# Patient Record
Sex: FEMALE | Race: WHITE | Employment: OTHER | URBAN - METROPOLITAN AREA
[De-identification: names, ages, dates, MRNs, and addresses within clinical notes are randomized per-mention and may not be internally consistent; named-entity substitution may affect disease eponyms.]

---

## 2017-01-03 RX ORDER — PANTOPRAZOLE SODIUM 40 MG/1
TABLET, DELAYED RELEASE ORAL
Qty: 90 TABLET | Refills: 3 | Status: SHIPPED | OUTPATIENT
Start: 2017-01-03 | End: 2017-04-27 | Stop reason: SDUPTHER

## 2017-02-10 DIAGNOSIS — E78.2 MIXED HYPERLIPIDEMIA: ICD-10-CM

## 2017-02-13 RX ORDER — CLONAZEPAM 0.5 MG/1
TABLET ORAL
Qty: 60 TABLET | Refills: 1 | Status: SHIPPED | OUTPATIENT
Start: 2017-02-13 | End: 2017-04-27 | Stop reason: SDUPTHER

## 2017-02-13 RX ORDER — SIMVASTATIN 20 MG
TABLET ORAL
Qty: 90 TABLET | Refills: 3 | Status: SHIPPED | OUTPATIENT
Start: 2017-02-13

## 2017-03-11 ENCOUNTER — HOSPITAL ENCOUNTER (OUTPATIENT)
Dept: WOMENS IMAGING | Age: 47
Discharge: HOME | End: 2017-03-11
Payer: COMMERCIAL

## 2017-03-11 DIAGNOSIS — Z13.9 SCREENING: ICD-10-CM

## 2017-03-11 PROCEDURE — G0202 SCR MAMMO BI INCL CAD: HCPCS

## 2017-03-14 DIAGNOSIS — R74.8 ELEVATED LIVER ENZYMES: ICD-10-CM

## 2017-03-14 DIAGNOSIS — K76.0 HEPATIC STEATOSIS: Primary | ICD-10-CM

## 2017-03-15 ENCOUNTER — OFFICE VISIT (OUTPATIENT)
Dept: GASTROENTEROLOGY | Age: 47
End: 2017-03-15
Payer: COMMERCIAL

## 2017-03-15 VITALS
OXYGEN SATURATION: 98 % | BODY MASS INDEX: 36.29 KG/M2 | WEIGHT: 225.8 LBS | SYSTOLIC BLOOD PRESSURE: 146 MMHG | HEIGHT: 66 IN | TEMPERATURE: 97.1 F | HEART RATE: 74 BPM | DIASTOLIC BLOOD PRESSURE: 82 MMHG

## 2017-03-15 DIAGNOSIS — R16.0 HEPATOMEGALY: ICD-10-CM

## 2017-03-15 DIAGNOSIS — R74.8 ELEVATED LIVER ENZYMES: ICD-10-CM

## 2017-03-15 DIAGNOSIS — R74.8 ELEVATED LIVER ENZYMES: Primary | ICD-10-CM

## 2017-03-15 DIAGNOSIS — K76.0 HEPATIC STEATOSIS: ICD-10-CM

## 2017-03-15 PROCEDURE — 99213 OFFICE O/P EST LOW 20 MIN: CPT | Performed by: INTERNAL MEDICINE

## 2017-03-15 PROCEDURE — 83037 HB GLYCOSYLATED A1C HOME DEV: CPT | Performed by: INTERNAL MEDICINE

## 2017-03-15 RX ORDER — VITAMIN E 268 MG
800 CAPSULE ORAL DAILY
Qty: 60 CAPSULE | Refills: 11 | Status: ON HOLD | OUTPATIENT
Start: 2017-03-15 | End: 2018-06-19 | Stop reason: HOSPADM

## 2017-03-15 ASSESSMENT — ENCOUNTER SYMPTOMS
VOMITING: 0
ABDOMINAL DISTENTION: 0
TROUBLE SWALLOWING: 0
BLOOD IN STOOL: 0
STRIDOR: 0
WHEEZING: 0
RESPIRATORY NEGATIVE: 1
BACK PAIN: 1
ABDOMINAL PAIN: 1
RECTAL PAIN: 0
RHINORRHEA: 0
VOICE CHANGE: 0
DIARRHEA: 1
ALLERGIC/IMMUNOLOGIC NEGATIVE: 1
SINUS PRESSURE: 0
NAUSEA: 0
ROS SKIN COMMENTS: ITCHING
CONSTIPATION: 1
FACIAL SWELLING: 0
ANAL BLEEDING: 0
SHORTNESS OF BREATH: 0
COUGH: 0
SORE THROAT: 0

## 2017-03-17 RX ORDER — DULOXETIN HYDROCHLORIDE 60 MG/1
60 CAPSULE, DELAYED RELEASE ORAL DAILY
Qty: 5 CAPSULE | Refills: 0 | Status: SHIPPED | OUTPATIENT
Start: 2017-03-17 | End: 2017-03-20 | Stop reason: SDUPTHER

## 2017-03-20 RX ORDER — DULOXETIN HYDROCHLORIDE 60 MG/1
60 CAPSULE, DELAYED RELEASE ORAL DAILY
Qty: 30 CAPSULE | Refills: 1 | Status: SHIPPED | OUTPATIENT
Start: 2017-03-20 | End: 2017-05-26 | Stop reason: SDUPTHER

## 2017-04-27 RX ORDER — CLONAZEPAM 0.5 MG/1
TABLET ORAL
Qty: 60 TABLET | Refills: 1 | Status: SHIPPED | OUTPATIENT
Start: 2017-04-27 | End: 2017-07-06 | Stop reason: SDUPTHER

## 2017-04-27 RX ORDER — PANTOPRAZOLE SODIUM 40 MG/1
TABLET, DELAYED RELEASE ORAL
Qty: 90 TABLET | Refills: 3 | Status: SHIPPED | OUTPATIENT
Start: 2017-04-27 | End: 2020-02-26

## 2017-05-30 ENCOUNTER — OFFICE VISIT (OUTPATIENT)
Dept: FAMILY MEDICINE CLINIC | Age: 47
End: 2017-05-30
Payer: COMMERCIAL

## 2017-05-30 DIAGNOSIS — L23.7 POISON IVY DERMATITIS: Primary | ICD-10-CM

## 2017-05-30 PROCEDURE — 96372 THER/PROPH/DIAG INJ SC/IM: CPT

## 2017-05-30 PROCEDURE — 99213 OFFICE O/P EST LOW 20 MIN: CPT

## 2017-05-30 RX ORDER — METHYLPREDNISOLONE ACETATE 40 MG/ML
40 INJECTION, SUSPENSION INTRA-ARTICULAR; INTRALESIONAL; INTRAMUSCULAR; SOFT TISSUE ONCE
Status: COMPLETED | OUTPATIENT
Start: 2017-05-30 | End: 2017-05-30

## 2017-05-30 RX ORDER — DULOXETIN HYDROCHLORIDE 60 MG/1
CAPSULE, DELAYED RELEASE ORAL
Qty: 30 CAPSULE | Refills: 1 | Status: SHIPPED | OUTPATIENT
Start: 2017-05-30 | End: 2017-07-24 | Stop reason: SDUPTHER

## 2017-05-30 RX ORDER — PREDNISONE 20 MG/1
20 TABLET ORAL 2 TIMES DAILY
Qty: 14 TABLET | Refills: 0 | Status: SHIPPED | OUTPATIENT
Start: 2017-05-30 | End: 2017-06-06

## 2017-05-30 RX ADMIN — METHYLPREDNISOLONE ACETATE 40 MG: 40 INJECTION, SUSPENSION INTRA-ARTICULAR; INTRALESIONAL; INTRAMUSCULAR; SOFT TISSUE at 15:35

## 2017-05-30 ASSESSMENT — PATIENT HEALTH QUESTIONNAIRE - PHQ9
1. LITTLE INTEREST OR PLEASURE IN DOING THINGS: 0
SUM OF ALL RESPONSES TO PHQ9 QUESTIONS 1 & 2: 0
SUM OF ALL RESPONSES TO PHQ QUESTIONS 1-9: 0
2. FEELING DOWN, DEPRESSED OR HOPELESS: 0

## 2017-05-30 ASSESSMENT — ENCOUNTER SYMPTOMS
WHEEZING: 0
SHORTNESS OF BREATH: 1
COUGH: 1

## 2017-07-06 RX ORDER — CLONAZEPAM 0.5 MG/1
TABLET ORAL
Qty: 60 TABLET | Refills: 1 | Status: SHIPPED | OUTPATIENT
Start: 2017-07-06

## 2017-07-25 RX ORDER — DULOXETIN HYDROCHLORIDE 60 MG/1
CAPSULE, DELAYED RELEASE ORAL
Qty: 90 CAPSULE | Refills: 2 | Status: SHIPPED | OUTPATIENT
Start: 2017-07-25 | End: 2017-08-08 | Stop reason: SDUPTHER

## 2017-08-09 RX ORDER — DULOXETIN HYDROCHLORIDE 60 MG/1
CAPSULE, DELAYED RELEASE ORAL
Qty: 90 CAPSULE | Refills: 2 | Status: ON HOLD | OUTPATIENT
Start: 2017-08-09 | End: 2020-05-01 | Stop reason: ALTCHOICE

## 2017-11-09 ENCOUNTER — TELEPHONE (OUTPATIENT)
Dept: GASTROENTEROLOGY | Age: 47
End: 2017-11-09

## 2017-11-09 NOTE — TELEPHONE ENCOUNTER
[birth year] lm due to conflict in the providers schedule appt for 3-15-18 has been cancelled to cb to r/s--jt  Letter sent also

## 2017-11-20 ENCOUNTER — HOSPITAL ENCOUNTER (OUTPATIENT)
Dept: WOMENS IMAGING | Age: 47
Discharge: HOME | End: 2017-11-20
Payer: COMMERCIAL

## 2017-11-20 DIAGNOSIS — M85.80 OSTEOPENIA, UNSPECIFIED LOCATION: ICD-10-CM

## 2017-11-20 PROCEDURE — 77080 DXA BONE DENSITY AXIAL: CPT

## 2018-03-08 ENCOUNTER — TELEPHONE (OUTPATIENT)
Dept: GASTROENTEROLOGY | Age: 48
End: 2018-03-08

## 2018-03-14 ENCOUNTER — OFFICE VISIT (OUTPATIENT)
Dept: GASTROENTEROLOGY | Age: 48
End: 2018-03-14
Payer: COMMERCIAL

## 2018-03-14 VITALS
HEIGHT: 67 IN | HEART RATE: 80 BPM | OXYGEN SATURATION: 97 % | DIASTOLIC BLOOD PRESSURE: 77 MMHG | WEIGHT: 216.6 LBS | SYSTOLIC BLOOD PRESSURE: 129 MMHG | BODY MASS INDEX: 34 KG/M2

## 2018-03-14 DIAGNOSIS — R74.8 ELEVATED LIVER ENZYMES: Primary | ICD-10-CM

## 2018-03-14 DIAGNOSIS — R16.0 HEPATOMEGALY: ICD-10-CM

## 2018-03-14 DIAGNOSIS — K76.0 HEPATIC STEATOSIS: ICD-10-CM

## 2018-03-14 PROCEDURE — 1036F TOBACCO NON-USER: CPT | Performed by: INTERNAL MEDICINE

## 2018-03-14 PROCEDURE — G8417 CALC BMI ABV UP PARAM F/U: HCPCS | Performed by: INTERNAL MEDICINE

## 2018-03-14 PROCEDURE — G8484 FLU IMMUNIZE NO ADMIN: HCPCS | Performed by: INTERNAL MEDICINE

## 2018-03-14 PROCEDURE — G8427 DOCREV CUR MEDS BY ELIG CLIN: HCPCS | Performed by: INTERNAL MEDICINE

## 2018-03-14 PROCEDURE — 99214 OFFICE O/P EST MOD 30 MIN: CPT | Performed by: INTERNAL MEDICINE

## 2018-03-14 PROCEDURE — 83037 HB GLYCOSYLATED A1C HOME DEV: CPT | Performed by: INTERNAL MEDICINE

## 2018-03-14 RX ORDER — AZITHROMYCIN 250 MG/1
1 TABLET, FILM COATED ORAL DAILY
Status: ON HOLD | COMMUNITY
Start: 2018-03-12 | End: 2018-06-19 | Stop reason: HOSPADM

## 2018-03-14 ASSESSMENT — ENCOUNTER SYMPTOMS
VOICE CHANGE: 0
VOMITING: 0
BACK PAIN: 1
FACIAL SWELLING: 0
NAUSEA: 0
CONSTIPATION: 0
DIARRHEA: 0
COUGH: 1
BLOOD IN STOOL: 0
SHORTNESS OF BREATH: 1
STRIDOR: 0
RHINORRHEA: 0
SORE THROAT: 0
SINUS PAIN: 0
ALLERGIC/IMMUNOLOGIC NEGATIVE: 1
TROUBLE SWALLOWING: 0
ANAL BLEEDING: 0
ABDOMINAL PAIN: 0
WHEEZING: 1
RECTAL PAIN: 0
ABDOMINAL DISTENTION: 0
SINUS PRESSURE: 1

## 2018-03-14 NOTE — PROGRESS NOTES
Subjective:      Patient ID: Rosalinda Carter is a 50 y.o. female. HPI   Dr. Juan Alberto Mcallister, LONI our mutual patient Rosalinda Carter was seen  for   1. Elevated liver enzymes    2. Hepatic steatosis    3. Hepatomegaly      Patient seen in the office for follow-up of liver disease. She was diagnosed to have fatty infiltration of the liver. In the last 1 year she lost 9 pounds. She is not able to do active exercise on daily basis. She denies abdominal pain, bloating, nausea or vomiting. Patient states that she has gynecological issues and is being followed by gynecologist.    Recently patient had liver tests done which appears to be normal.  Hemoglobin A1c was not done. Past Medical, Family, and Social History reviewed and does contribute to the patient presenting condition. patient\"s PMH/PSH,SH,PSYCH hx, MEDs, ALLERGIES, and ROS was all reviewed and updated ion the appropriate sections      Review of Systems   Constitutional: Positive for fatigue. Negative for activity change, appetite change, chills, diaphoresis, fever and unexpected weight change. HENT: Positive for congestion, postnasal drip and sinus pressure. Negative for dental problem, drooling, ear discharge, ear pain, facial swelling, hearing loss, mouth sores, nosebleeds, rhinorrhea, sinus pain, sneezing, sore throat, tinnitus, trouble swallowing and voice change. Eyes: Positive for visual disturbance (glasses). Respiratory: Positive for cough, shortness of breath and wheezing. Negative for stridor. Being tx for URI   Cardiovascular: Positive for palpitations (off and on). Negative for chest pain and leg swelling. Gastrointestinal: Negative for abdominal distention, abdominal pain, anal bleeding, blood in stool, constipation, diarrhea, nausea, rectal pain and vomiting. Endocrine: Negative. Negative for polyphagia and polyuria. Genitourinary: Negative.   Negative for difficulty urinating, dysuria, frequency, hematuria, vaginal bleeding and vaginal discharge. Musculoskeletal: Positive for arthralgias and back pain. Negative for gait problem. Allergic/Immunologic: Negative. Negative for environmental allergies, food allergies and immunocompromised state. Neurological: Positive for light-headedness. Negative for dizziness, tremors, seizures, syncope, facial asymmetry, speech difficulty, weakness, numbness and headaches. Hematological: Negative. Psychiatric/Behavioral: Positive for sleep disturbance. The patient is nervous/anxious. Objective:   Physical Exam   Constitutional: She is oriented to person, place, and time. She appears well-developed and well-nourished. HENT:   Head: Normocephalic and atraumatic. No oral lesions   Eyes: Conjunctivae are normal. Pupils are equal, round, and reactive to light. No scleral icterus. Neck: Normal range of motion. Neck supple. No hepatojugular reflux and no JVD present. No tracheal deviation present. No thyromegaly present. Cardiovascular: Normal rate, regular rhythm, normal heart sounds and intact distal pulses. Pulmonary/Chest: Effort normal and breath sounds normal. No respiratory distress. She has no wheezes. She has no rales. Abdominal: Soft. Bowel sounds are normal. She exhibits no distension, no ascites and no mass. There is no hepatomegaly. There is no tenderness. There is no rebound. No hernia. Musculoskeletal: She exhibits no edema or tenderness. No joint swelling   Lymphadenopathy:     She has no cervical adenopathy. Neurological: She is alert and oriented to person, place, and time. No cranial nerve deficit. Skin: Skin is warm. No bruising, no ecchymosis and no rash noted. No erythema. Psychiatric: Thought content normal.   Nursing note and vitals reviewed. Assessment:      1. Elevated liver enzymes     2. Hepatic steatosis  POCT glycosylated hemoglobin, home device (HbA1C)   3. Hepatomegaly             Plan:       At present her examination nonspecific. Patient still appears to be overweight. Discussed with the patient regarding fatty liver and management. Strongly encouraged active lifestyle, daily exercise, calorie restriction and lose weight. Also advised to have hemoglobin A1c done. To see me in the next 1 year.

## 2018-06-14 ENCOUNTER — HOSPITAL ENCOUNTER (EMERGENCY)
Age: 48
Discharge: HOME | DRG: 581 | End: 2018-06-14
Attending: EMERGENCY MEDICINE
Payer: COMMERCIAL

## 2018-06-14 VITALS
SYSTOLIC BLOOD PRESSURE: 159 MMHG | RESPIRATION RATE: 19 BRPM | DIASTOLIC BLOOD PRESSURE: 99 MMHG | HEART RATE: 89 BPM | TEMPERATURE: 98.2 F | OXYGEN SATURATION: 99 %

## 2018-06-14 DIAGNOSIS — L02.13 CARBUNCLE OF NECK: Primary | ICD-10-CM

## 2018-06-14 PROCEDURE — 0J940ZZ DRAINAGE OF RIGHT NECK SUBCUTANEOUS TISSUE AND FASCIA, OPEN APPROACH: ICD-10-PCS | Performed by: EMERGENCY MEDICINE

## 2018-06-14 PROCEDURE — 2500000003 HC RX 250 WO HCPCS: Performed by: EMERGENCY MEDICINE

## 2018-06-14 PROCEDURE — 6370000000 HC RX 637 (ALT 250 FOR IP): Performed by: EMERGENCY MEDICINE

## 2018-06-14 PROCEDURE — 99282 EMERGENCY DEPT VISIT SF MDM: CPT

## 2018-06-14 PROCEDURE — 10060 I&D ABSCESS SIMPLE/SINGLE: CPT

## 2018-06-14 RX ORDER — LIDOCAINE HYDROCHLORIDE AND EPINEPHRINE 10; 10 MG/ML; UG/ML
50 INJECTION, SOLUTION INFILTRATION; PERINEURAL ONCE
Status: COMPLETED | OUTPATIENT
Start: 2018-06-14 | End: 2018-06-14

## 2018-06-14 RX ORDER — HYDROCODONE BITARTRATE AND ACETAMINOPHEN 5; 325 MG/1; MG/1
1 TABLET ORAL ONCE
Status: COMPLETED | OUTPATIENT
Start: 2018-06-14 | End: 2018-06-14

## 2018-06-14 RX ORDER — HYDROCODONE BITARTRATE AND ACETAMINOPHEN 5; 325 MG/1; MG/1
1 TABLET ORAL EVERY 6 HOURS PRN
Qty: 6 TABLET | Refills: 0 | Status: SHIPPED | OUTPATIENT
Start: 2018-06-14 | End: 2018-06-21

## 2018-06-14 RX ORDER — ACETAMINOPHEN 500 MG
500 TABLET ORAL EVERY 6 HOURS PRN
Qty: 60 TABLET | Refills: 0 | Status: SHIPPED | OUTPATIENT
Start: 2018-06-14 | End: 2018-12-27

## 2018-06-14 RX ADMIN — LIDOCAINE HYDROCHLORIDE AND EPINEPHRINE 50 ML: 10; 10 INJECTION, SOLUTION INFILTRATION; PERINEURAL at 01:24

## 2018-06-14 RX ADMIN — HYDROCODONE BITARTRATE AND ACETAMINOPHEN 1 TABLET: 5; 325 TABLET ORAL at 01:24

## 2018-06-14 ASSESSMENT — ENCOUNTER SYMPTOMS
BACK PAIN: 0
SHORTNESS OF BREATH: 0
ABDOMINAL PAIN: 0
NAUSEA: 0
EYE PAIN: 0
VOMITING: 0
COUGH: 0
DIARRHEA: 0
SORE THROAT: 0

## 2018-06-14 ASSESSMENT — PAIN SCALES - GENERAL
PAINLEVEL_OUTOF10: 10
PAINLEVEL_OUTOF10: 10

## 2018-06-14 ASSESSMENT — PAIN DESCRIPTION - LOCATION: LOCATION: NECK

## 2018-06-14 ASSESSMENT — PAIN DESCRIPTION - PAIN TYPE: TYPE: ACUTE PAIN

## 2018-06-15 ENCOUNTER — APPOINTMENT (OUTPATIENT)
Dept: CT IMAGING | Age: 48
DRG: 581 | End: 2018-06-15
Payer: COMMERCIAL

## 2018-06-15 ENCOUNTER — HOSPITAL ENCOUNTER (INPATIENT)
Age: 48
LOS: 4 days | Discharge: HOME HEALTH | DRG: 581 | End: 2018-06-19
Attending: EMERGENCY MEDICINE | Admitting: FAMILY MEDICINE
Payer: COMMERCIAL

## 2018-06-15 DIAGNOSIS — L02.91 ABSCESS: Primary | ICD-10-CM

## 2018-06-15 PROBLEM — L03.90 CELLULITIS: Status: ACTIVE | Noted: 2018-06-15

## 2018-06-15 LAB
ABSOLUTE EOS #: 0.4 K/UL (ref 0–0.4)
ABSOLUTE IMMATURE GRANULOCYTE: NORMAL K/UL (ref 0–0.3)
ABSOLUTE LYMPH #: 2.6 K/UL (ref 1–4.8)
ABSOLUTE MONO #: 0.7 K/UL (ref 0.1–1.3)
ANION GAP SERPL CALCULATED.3IONS-SCNC: 12 MMOL/L (ref 9–17)
BASOPHILS # BLD: 1 % (ref 0–2)
BASOPHILS ABSOLUTE: 0.1 K/UL (ref 0–0.2)
BUN BLDV-MCNC: 11 MG/DL (ref 6–20)
BUN/CREAT BLD: ABNORMAL (ref 9–20)
CALCIUM SERPL-MCNC: 9.4 MG/DL (ref 8.6–10.4)
CHLORIDE BLD-SCNC: 102 MMOL/L (ref 98–107)
CO2: 26 MMOL/L (ref 20–31)
CREAT SERPL-MCNC: 0.56 MG/DL (ref 0.5–0.9)
DIFFERENTIAL TYPE: NORMAL
EOSINOPHILS RELATIVE PERCENT: 4 % (ref 0–4)
GFR AFRICAN AMERICAN: >60 ML/MIN
GFR NON-AFRICAN AMERICAN: >60 ML/MIN
GFR SERPL CREATININE-BSD FRML MDRD: ABNORMAL ML/MIN/{1.73_M2}
GFR SERPL CREATININE-BSD FRML MDRD: ABNORMAL ML/MIN/{1.73_M2}
GLUCOSE BLD-MCNC: 103 MG/DL (ref 70–99)
HCT VFR BLD CALC: 40.7 % (ref 36–46)
HEMOGLOBIN: 13.8 G/DL (ref 12–16)
IMMATURE GRANULOCYTES: NORMAL %
LACTIC ACID: 1.5 MMOL/L (ref 0.5–2.2)
LYMPHOCYTES # BLD: 27 % (ref 24–44)
MCH RBC QN AUTO: 29.4 PG (ref 26–34)
MCHC RBC AUTO-ENTMCNC: 33.8 G/DL (ref 31–37)
MCV RBC AUTO: 86.9 FL (ref 80–100)
MONOCYTES # BLD: 7 % (ref 1–7)
NRBC AUTOMATED: NORMAL PER 100 WBC
PDW BLD-RTO: 13.6 % (ref 11.5–14.9)
PLATELET # BLD: 283 K/UL (ref 150–450)
PLATELET ESTIMATE: NORMAL
PMV BLD AUTO: 7.3 FL (ref 6–12)
POTASSIUM SERPL-SCNC: 3.9 MMOL/L (ref 3.7–5.3)
RBC # BLD: 4.68 M/UL (ref 4–5.2)
RBC # BLD: NORMAL 10*6/UL
SEG NEUTROPHILS: 61 % (ref 36–66)
SEGMENTED NEUTROPHILS ABSOLUTE COUNT: 6.1 K/UL (ref 1.3–9.1)
SODIUM BLD-SCNC: 140 MMOL/L (ref 135–144)
WBC # BLD: 9.9 K/UL (ref 3.5–11)
WBC # BLD: NORMAL 10*3/UL

## 2018-06-15 PROCEDURE — 85025 COMPLETE CBC W/AUTO DIFF WBC: CPT

## 2018-06-15 PROCEDURE — 83605 ASSAY OF LACTIC ACID: CPT

## 2018-06-15 PROCEDURE — 36415 COLL VENOUS BLD VENIPUNCTURE: CPT

## 2018-06-15 PROCEDURE — 6360000002 HC RX W HCPCS: Performed by: FAMILY MEDICINE

## 2018-06-15 PROCEDURE — 1200000000 HC SEMI PRIVATE

## 2018-06-15 PROCEDURE — 80048 BASIC METABOLIC PNL TOTAL CA: CPT

## 2018-06-15 PROCEDURE — 6360000002 HC RX W HCPCS: Performed by: EMERGENCY MEDICINE

## 2018-06-15 PROCEDURE — 96375 TX/PRO/DX INJ NEW DRUG ADDON: CPT

## 2018-06-15 PROCEDURE — 2580000003 HC RX 258: Performed by: EMERGENCY MEDICINE

## 2018-06-15 PROCEDURE — 87040 BLOOD CULTURE FOR BACTERIA: CPT

## 2018-06-15 PROCEDURE — 6360000004 HC RX CONTRAST MEDICATION: Performed by: EMERGENCY MEDICINE

## 2018-06-15 PROCEDURE — 70491 CT SOFT TISSUE NECK W/DYE: CPT

## 2018-06-15 PROCEDURE — 2580000003 HC RX 258: Performed by: FAMILY MEDICINE

## 2018-06-15 PROCEDURE — 99284 EMERGENCY DEPT VISIT MOD MDM: CPT

## 2018-06-15 PROCEDURE — 6370000000 HC RX 637 (ALT 250 FOR IP): Performed by: FAMILY MEDICINE

## 2018-06-15 PROCEDURE — 96374 THER/PROPH/DIAG INJ IV PUSH: CPT

## 2018-06-15 RX ORDER — SODIUM CHLORIDE 0.9 % (FLUSH) 0.9 %
10 SYRINGE (ML) INJECTION EVERY 12 HOURS SCHEDULED
Status: DISCONTINUED | OUTPATIENT
Start: 2018-06-15 | End: 2018-06-15 | Stop reason: SDUPTHER

## 2018-06-15 RX ORDER — CLONAZEPAM 0.5 MG/1
0.5 TABLET ORAL 2 TIMES DAILY
Status: DISCONTINUED | OUTPATIENT
Start: 2018-06-15 | End: 2018-06-19 | Stop reason: HOSPADM

## 2018-06-15 RX ORDER — SODIUM CHLORIDE 0.9 % (FLUSH) 0.9 %
10 SYRINGE (ML) INJECTION PRN
Status: DISCONTINUED | OUTPATIENT
Start: 2018-06-15 | End: 2018-06-15 | Stop reason: SDUPTHER

## 2018-06-15 RX ORDER — DULOXETIN HYDROCHLORIDE 60 MG/1
60 CAPSULE, DELAYED RELEASE ORAL DAILY
Status: DISCONTINUED | OUTPATIENT
Start: 2018-06-15 | End: 2018-06-19 | Stop reason: HOSPADM

## 2018-06-15 RX ORDER — ACETAMINOPHEN 325 MG/1
650 TABLET ORAL EVERY 4 HOURS PRN
Status: DISCONTINUED | OUTPATIENT
Start: 2018-06-15 | End: 2018-06-19 | Stop reason: HOSPADM

## 2018-06-15 RX ORDER — SIMVASTATIN 20 MG
20 TABLET ORAL NIGHTLY
Status: DISCONTINUED | OUTPATIENT
Start: 2018-06-15 | End: 2018-06-19 | Stop reason: HOSPADM

## 2018-06-15 RX ORDER — FENTANYL CITRATE 50 UG/ML
50 INJECTION, SOLUTION INTRAMUSCULAR; INTRAVENOUS
Status: DISCONTINUED | OUTPATIENT
Start: 2018-06-15 | End: 2018-06-15 | Stop reason: SDUPTHER

## 2018-06-15 RX ORDER — CIPROFLOXACIN 500 MG/1
500 TABLET, FILM COATED ORAL 2 TIMES DAILY
COMMUNITY
End: 2018-08-09

## 2018-06-15 RX ORDER — PANTOPRAZOLE SODIUM 40 MG/1
40 TABLET, DELAYED RELEASE ORAL DAILY
Status: DISCONTINUED | OUTPATIENT
Start: 2018-06-16 | End: 2018-06-19 | Stop reason: HOSPADM

## 2018-06-15 RX ORDER — SODIUM CHLORIDE 0.9 % (FLUSH) 0.9 %
10 SYRINGE (ML) INJECTION PRN
Status: DISCONTINUED | OUTPATIENT
Start: 2018-06-15 | End: 2018-06-19 | Stop reason: HOSPADM

## 2018-06-15 RX ORDER — DOXYCYCLINE HYCLATE 100 MG/1
100 CAPSULE ORAL 2 TIMES DAILY
COMMUNITY
End: 2019-06-17 | Stop reason: ALTCHOICE

## 2018-06-15 RX ORDER — METOPROLOL TARTRATE 100 MG/1
100 TABLET ORAL DAILY
Status: DISCONTINUED | OUTPATIENT
Start: 2018-06-15 | End: 2018-06-19 | Stop reason: HOSPADM

## 2018-06-15 RX ORDER — FLUDROCORTISONE ACETATE 0.1 MG/1
0.1 TABLET ORAL DAILY
Status: DISCONTINUED | OUTPATIENT
Start: 2018-06-15 | End: 2018-06-19 | Stop reason: HOSPADM

## 2018-06-15 RX ORDER — ONDANSETRON 2 MG/ML
4 INJECTION INTRAMUSCULAR; INTRAVENOUS ONCE
Status: COMPLETED | OUTPATIENT
Start: 2018-06-15 | End: 2018-06-15

## 2018-06-15 RX ORDER — FENTANYL CITRATE 50 UG/ML
50 INJECTION, SOLUTION INTRAMUSCULAR; INTRAVENOUS
Status: DISCONTINUED | OUTPATIENT
Start: 2018-06-15 | End: 2018-06-16

## 2018-06-15 RX ORDER — KETOROLAC TROMETHAMINE 30 MG/ML
30 INJECTION, SOLUTION INTRAMUSCULAR; INTRAVENOUS EVERY 8 HOURS
Status: COMPLETED | OUTPATIENT
Start: 2018-06-15 | End: 2018-06-17

## 2018-06-15 RX ORDER — SODIUM CHLORIDE 0.9 % (FLUSH) 0.9 %
10 SYRINGE (ML) INJECTION EVERY 12 HOURS SCHEDULED
Status: DISCONTINUED | OUTPATIENT
Start: 2018-06-15 | End: 2018-06-19 | Stop reason: HOSPADM

## 2018-06-15 RX ORDER — 0.9 % SODIUM CHLORIDE 0.9 %
45 INTRAVENOUS SOLUTION INTRAVENOUS ONCE
Status: COMPLETED | OUTPATIENT
Start: 2018-06-15 | End: 2018-06-15

## 2018-06-15 RX ORDER — ONDANSETRON 2 MG/ML
4 INJECTION INTRAMUSCULAR; INTRAVENOUS EVERY 8 HOURS PRN
Status: DISCONTINUED | OUTPATIENT
Start: 2018-06-15 | End: 2018-06-15 | Stop reason: SDUPTHER

## 2018-06-15 RX ORDER — FENTANYL CITRATE 50 UG/ML
50 INJECTION, SOLUTION INTRAMUSCULAR; INTRAVENOUS ONCE
Status: COMPLETED | OUTPATIENT
Start: 2018-06-15 | End: 2018-06-15

## 2018-06-15 RX ORDER — FENTANYL CITRATE 50 UG/ML
75 INJECTION, SOLUTION INTRAMUSCULAR; INTRAVENOUS ONCE
Status: COMPLETED | OUTPATIENT
Start: 2018-06-15 | End: 2018-06-15

## 2018-06-15 RX ORDER — ACETAMINOPHEN 500 MG
500 TABLET ORAL EVERY 6 HOURS PRN
Status: DISCONTINUED | OUTPATIENT
Start: 2018-06-15 | End: 2018-06-19 | Stop reason: HOSPADM

## 2018-06-15 RX ORDER — ONDANSETRON 2 MG/ML
4 INJECTION INTRAMUSCULAR; INTRAVENOUS EVERY 6 HOURS PRN
Status: DISCONTINUED | OUTPATIENT
Start: 2018-06-15 | End: 2018-06-19 | Stop reason: HOSPADM

## 2018-06-15 RX ADMIN — KETOROLAC TROMETHAMINE 30 MG: 30 INJECTION, SOLUTION INTRAMUSCULAR at 20:47

## 2018-06-15 RX ADMIN — FENTANYL CITRATE 75 MCG: 50 INJECTION INTRAMUSCULAR; INTRAVENOUS at 15:31

## 2018-06-15 RX ADMIN — VANCOMYCIN HYDROCHLORIDE 1250 MG: 10 INJECTION, POWDER, LYOPHILIZED, FOR SOLUTION INTRAVENOUS at 15:35

## 2018-06-15 RX ADMIN — SIMVASTATIN 20 MG: 20 TABLET, FILM COATED ORAL at 20:47

## 2018-06-15 RX ADMIN — ONDANSETRON 4 MG: 2 INJECTION, SOLUTION INTRAMUSCULAR; INTRAVENOUS at 19:18

## 2018-06-15 RX ADMIN — Medication 10 ML: at 20:49

## 2018-06-15 RX ADMIN — FENTANYL CITRATE 50 MCG: 50 INJECTION INTRAMUSCULAR; INTRAVENOUS at 14:07

## 2018-06-15 RX ADMIN — Medication 10 ML: at 14:48

## 2018-06-15 RX ADMIN — Medication 3 MG: at 20:47

## 2018-06-15 RX ADMIN — ONDANSETRON 4 MG: 2 INJECTION INTRAMUSCULAR; INTRAVENOUS at 14:07

## 2018-06-15 RX ADMIN — SODIUM CHLORIDE 45 ML: 9 INJECTION, SOLUTION INTRAVENOUS at 14:49

## 2018-06-15 RX ADMIN — CLONAZEPAM 0.5 MG: 0.5 TABLET ORAL at 20:47

## 2018-06-15 RX ADMIN — IOPAMIDOL 75 ML: 755 INJECTION, SOLUTION INTRAVENOUS at 14:48

## 2018-06-15 ASSESSMENT — PAIN DESCRIPTION - ORIENTATION: ORIENTATION: POSTERIOR

## 2018-06-15 ASSESSMENT — ENCOUNTER SYMPTOMS
ABDOMINAL PAIN: 0
COUGH: 0

## 2018-06-15 ASSESSMENT — PAIN SCALES - GENERAL
PAINLEVEL_OUTOF10: 10
PAINLEVEL_OUTOF10: 8
PAINLEVEL_OUTOF10: 10
PAINLEVEL_OUTOF10: 8
PAINLEVEL_OUTOF10: 10

## 2018-06-15 ASSESSMENT — PAIN DESCRIPTION - FREQUENCY: FREQUENCY: CONTINUOUS

## 2018-06-15 ASSESSMENT — PAIN DESCRIPTION - DESCRIPTORS: DESCRIPTORS: ACHING

## 2018-06-15 ASSESSMENT — PAIN DESCRIPTION - LOCATION: LOCATION: NECK

## 2018-06-16 LAB
BUN BLDV-MCNC: 12 MG/DL (ref 6–20)
CREAT SERPL-MCNC: 0.65 MG/DL (ref 0.5–0.9)
GFR AFRICAN AMERICAN: >60 ML/MIN
GFR NON-AFRICAN AMERICAN: >60 ML/MIN
GFR SERPL CREATININE-BSD FRML MDRD: NORMAL ML/MIN/{1.73_M2}
GFR SERPL CREATININE-BSD FRML MDRD: NORMAL ML/MIN/{1.73_M2}

## 2018-06-16 PROCEDURE — 86403 PARTICLE AGGLUT ANTBDY SCRN: CPT

## 2018-06-16 PROCEDURE — 2580000003 HC RX 258: Performed by: FAMILY MEDICINE

## 2018-06-16 PROCEDURE — 87075 CULTR BACTERIA EXCEPT BLOOD: CPT

## 2018-06-16 PROCEDURE — 82565 ASSAY OF CREATININE: CPT

## 2018-06-16 PROCEDURE — 87186 SC STD MICRODIL/AGAR DIL: CPT

## 2018-06-16 PROCEDURE — 1200000000 HC SEMI PRIVATE

## 2018-06-16 PROCEDURE — 6360000002 HC RX W HCPCS: Performed by: FAMILY MEDICINE

## 2018-06-16 PROCEDURE — 6370000000 HC RX 637 (ALT 250 FOR IP): Performed by: FAMILY MEDICINE

## 2018-06-16 PROCEDURE — 36415 COLL VENOUS BLD VENIPUNCTURE: CPT

## 2018-06-16 PROCEDURE — 87070 CULTURE OTHR SPECIMN AEROBIC: CPT

## 2018-06-16 PROCEDURE — 84520 ASSAY OF UREA NITROGEN: CPT

## 2018-06-16 PROCEDURE — 87205 SMEAR GRAM STAIN: CPT

## 2018-06-16 RX ORDER — TRAMADOL HYDROCHLORIDE 50 MG/1
50 TABLET ORAL EVERY 6 HOURS PRN
Status: DISCONTINUED | OUTPATIENT
Start: 2018-06-16 | End: 2018-06-19 | Stop reason: HOSPADM

## 2018-06-16 RX ADMIN — Medication 10 ML: at 08:30

## 2018-06-16 RX ADMIN — Medication 3 MG: at 21:19

## 2018-06-16 RX ADMIN — KETOROLAC TROMETHAMINE 30 MG: 30 INJECTION, SOLUTION INTRAMUSCULAR at 04:07

## 2018-06-16 RX ADMIN — PANTOPRAZOLE SODIUM 40 MG: 40 TABLET, DELAYED RELEASE ORAL at 06:17

## 2018-06-16 RX ADMIN — DULOXETINE HYDROCHLORIDE 60 MG: 60 CAPSULE, DELAYED RELEASE ORAL at 08:29

## 2018-06-16 RX ADMIN — ACETAMINOPHEN 650 MG: 325 TABLET, FILM COATED ORAL at 08:35

## 2018-06-16 RX ADMIN — Medication 10 ML: at 21:21

## 2018-06-16 RX ADMIN — SIMVASTATIN 20 MG: 20 TABLET, FILM COATED ORAL at 21:20

## 2018-06-16 RX ADMIN — CLONAZEPAM 0.5 MG: 0.5 TABLET ORAL at 08:29

## 2018-06-16 RX ADMIN — VANCOMYCIN HYDROCHLORIDE 1500 MG: 10 INJECTION, POWDER, LYOPHILIZED, FOR SOLUTION INTRAVENOUS at 04:07

## 2018-06-16 RX ADMIN — KETOROLAC TROMETHAMINE 30 MG: 30 INJECTION, SOLUTION INTRAMUSCULAR at 21:20

## 2018-06-16 RX ADMIN — VANCOMYCIN HYDROCHLORIDE 1500 MG: 10 INJECTION, POWDER, LYOPHILIZED, FOR SOLUTION INTRAVENOUS at 14:38

## 2018-06-16 RX ADMIN — METOPROLOL TARTRATE 100 MG: 100 TABLET ORAL at 08:29

## 2018-06-16 RX ADMIN — KETOROLAC TROMETHAMINE 30 MG: 30 INJECTION, SOLUTION INTRAMUSCULAR at 14:08

## 2018-06-16 RX ADMIN — CLONAZEPAM 0.5 MG: 0.5 TABLET ORAL at 21:20

## 2018-06-16 RX ADMIN — FLUDROCORTISONE ACETATE 0.1 MG: 0.1 TABLET ORAL at 08:30

## 2018-06-16 ASSESSMENT — PAIN SCALES - GENERAL
PAINLEVEL_OUTOF10: 2
PAINLEVEL_OUTOF10: 5
PAINLEVEL_OUTOF10: 1
PAINLEVEL_OUTOF10: 1
PAINLEVEL_OUTOF10: 5
PAINLEVEL_OUTOF10: 2

## 2018-06-17 LAB
BUN BLDV-MCNC: 14 MG/DL (ref 6–20)
CREAT SERPL-MCNC: 0.65 MG/DL (ref 0.5–0.9)
GFR AFRICAN AMERICAN: >60 ML/MIN
GFR NON-AFRICAN AMERICAN: >60 ML/MIN
GFR SERPL CREATININE-BSD FRML MDRD: NORMAL ML/MIN/{1.73_M2}
GFR SERPL CREATININE-BSD FRML MDRD: NORMAL ML/MIN/{1.73_M2}
VANCOMYCIN TROUGH DATE LAST DOSE: NORMAL
VANCOMYCIN TROUGH DOSE AMOUNT: NORMAL
VANCOMYCIN TROUGH TIME LAST DOSE: NORMAL
VANCOMYCIN TROUGH: 18.4 UG/ML (ref 10–20)

## 2018-06-17 PROCEDURE — 84520 ASSAY OF UREA NITROGEN: CPT

## 2018-06-17 PROCEDURE — 1200000000 HC SEMI PRIVATE

## 2018-06-17 PROCEDURE — 6360000002 HC RX W HCPCS: Performed by: FAMILY MEDICINE

## 2018-06-17 PROCEDURE — 6370000000 HC RX 637 (ALT 250 FOR IP): Performed by: FAMILY MEDICINE

## 2018-06-17 PROCEDURE — 80202 ASSAY OF VANCOMYCIN: CPT

## 2018-06-17 PROCEDURE — 99254 IP/OBS CNSLTJ NEW/EST MOD 60: CPT | Performed by: INTERNAL MEDICINE

## 2018-06-17 PROCEDURE — 36415 COLL VENOUS BLD VENIPUNCTURE: CPT

## 2018-06-17 PROCEDURE — 2580000003 HC RX 258: Performed by: FAMILY MEDICINE

## 2018-06-17 PROCEDURE — 82565 ASSAY OF CREATININE: CPT

## 2018-06-17 RX ADMIN — CLONAZEPAM 0.5 MG: 0.5 TABLET ORAL at 22:03

## 2018-06-17 RX ADMIN — KETOROLAC TROMETHAMINE 30 MG: 30 INJECTION, SOLUTION INTRAMUSCULAR at 05:33

## 2018-06-17 RX ADMIN — FLUDROCORTISONE ACETATE 0.1 MG: 0.1 TABLET ORAL at 07:39

## 2018-06-17 RX ADMIN — DULOXETINE HYDROCHLORIDE 60 MG: 60 CAPSULE, DELAYED RELEASE ORAL at 07:39

## 2018-06-17 RX ADMIN — PANTOPRAZOLE SODIUM 40 MG: 40 TABLET, DELAYED RELEASE ORAL at 05:33

## 2018-06-17 RX ADMIN — VANCOMYCIN HYDROCHLORIDE 1500 MG: 10 INJECTION, POWDER, LYOPHILIZED, FOR SOLUTION INTRAVENOUS at 02:24

## 2018-06-17 RX ADMIN — METOPROLOL TARTRATE 100 MG: 100 TABLET ORAL at 07:39

## 2018-06-17 RX ADMIN — SIMVASTATIN 20 MG: 20 TABLET, FILM COATED ORAL at 22:03

## 2018-06-17 RX ADMIN — Medication 10 ML: at 22:03

## 2018-06-17 RX ADMIN — Medication 10 ML: at 07:40

## 2018-06-17 RX ADMIN — Medication 3 MG: at 22:03

## 2018-06-17 RX ADMIN — CLONAZEPAM 0.5 MG: 0.5 TABLET ORAL at 07:39

## 2018-06-17 RX ADMIN — VANCOMYCIN HYDROCHLORIDE 1500 MG: 10 INJECTION, POWDER, LYOPHILIZED, FOR SOLUTION INTRAVENOUS at 14:07

## 2018-06-17 ASSESSMENT — PAIN SCALES - GENERAL
PAINLEVEL_OUTOF10: 2
PAINLEVEL_OUTOF10: 0

## 2018-06-18 ENCOUNTER — ANESTHESIA EVENT (OUTPATIENT)
Dept: OPERATING ROOM | Age: 48
DRG: 581 | End: 2018-06-18
Payer: COMMERCIAL

## 2018-06-18 ENCOUNTER — ANESTHESIA (OUTPATIENT)
Dept: OPERATING ROOM | Age: 48
DRG: 581 | End: 2018-06-18
Payer: COMMERCIAL

## 2018-06-18 VITALS — TEMPERATURE: 96.3 F | SYSTOLIC BLOOD PRESSURE: 172 MMHG | DIASTOLIC BLOOD PRESSURE: 93 MMHG | OXYGEN SATURATION: 100 %

## 2018-06-18 LAB
BUN BLDV-MCNC: 12 MG/DL (ref 6–20)
CREAT SERPL-MCNC: 0.61 MG/DL (ref 0.5–0.9)
GFR AFRICAN AMERICAN: >60 ML/MIN
GFR NON-AFRICAN AMERICAN: >60 ML/MIN
GFR SERPL CREATININE-BSD FRML MDRD: NORMAL ML/MIN/{1.73_M2}
GFR SERPL CREATININE-BSD FRML MDRD: NORMAL ML/MIN/{1.73_M2}

## 2018-06-18 PROCEDURE — 87205 SMEAR GRAM STAIN: CPT

## 2018-06-18 PROCEDURE — 2500000003 HC RX 250 WO HCPCS: Performed by: SURGERY

## 2018-06-18 PROCEDURE — 2580000003 HC RX 258: Performed by: ANESTHESIOLOGY

## 2018-06-18 PROCEDURE — 3700000000 HC ANESTHESIA ATTENDED CARE: Performed by: SURGERY

## 2018-06-18 PROCEDURE — 7100000001 HC PACU RECOVERY - ADDTL 15 MIN: Performed by: SURGERY

## 2018-06-18 PROCEDURE — 6370000000 HC RX 637 (ALT 250 FOR IP): Performed by: FAMILY MEDICINE

## 2018-06-18 PROCEDURE — 2500000003 HC RX 250 WO HCPCS

## 2018-06-18 PROCEDURE — 87075 CULTR BACTERIA EXCEPT BLOOD: CPT

## 2018-06-18 PROCEDURE — 3600000012 HC SURGERY LEVEL 2 ADDTL 15MIN: Performed by: SURGERY

## 2018-06-18 PROCEDURE — 6360000002 HC RX W HCPCS

## 2018-06-18 PROCEDURE — 87070 CULTURE OTHR SPECIMN AEROBIC: CPT

## 2018-06-18 PROCEDURE — 3700000001 HC ADD 15 MINUTES (ANESTHESIA): Performed by: SURGERY

## 2018-06-18 PROCEDURE — 1200000000 HC SEMI PRIVATE

## 2018-06-18 PROCEDURE — 7100000000 HC PACU RECOVERY - FIRST 15 MIN: Performed by: SURGERY

## 2018-06-18 PROCEDURE — 84520 ASSAY OF UREA NITROGEN: CPT

## 2018-06-18 PROCEDURE — 6370000000 HC RX 637 (ALT 250 FOR IP): Performed by: ANESTHESIOLOGY

## 2018-06-18 PROCEDURE — 86403 PARTICLE AGGLUT ANTBDY SCRN: CPT

## 2018-06-18 PROCEDURE — 6360000002 HC RX W HCPCS: Performed by: FAMILY MEDICINE

## 2018-06-18 PROCEDURE — 2580000003 HC RX 258: Performed by: FAMILY MEDICINE

## 2018-06-18 PROCEDURE — 3600000002 HC SURGERY LEVEL 2 BASE: Performed by: SURGERY

## 2018-06-18 PROCEDURE — 36415 COLL VENOUS BLD VENIPUNCTURE: CPT

## 2018-06-18 PROCEDURE — 82565 ASSAY OF CREATININE: CPT

## 2018-06-18 RX ORDER — SCOLOPAMINE TRANSDERMAL SYSTEM 1 MG/1
1 PATCH, EXTENDED RELEASE TRANSDERMAL
Status: DISCONTINUED | OUTPATIENT
Start: 2018-06-18 | End: 2018-06-19 | Stop reason: HOSPADM

## 2018-06-18 RX ORDER — FENTANYL CITRATE 50 UG/ML
INJECTION, SOLUTION INTRAMUSCULAR; INTRAVENOUS PRN
Status: DISCONTINUED | OUTPATIENT
Start: 2018-06-18 | End: 2018-06-18 | Stop reason: SDUPTHER

## 2018-06-18 RX ORDER — ONDANSETRON 2 MG/ML
INJECTION INTRAMUSCULAR; INTRAVENOUS PRN
Status: DISCONTINUED | OUTPATIENT
Start: 2018-06-18 | End: 2018-06-18 | Stop reason: SDUPTHER

## 2018-06-18 RX ORDER — MEPERIDINE HYDROCHLORIDE 50 MG/ML
12.5 INJECTION INTRAMUSCULAR; INTRAVENOUS; SUBCUTANEOUS EVERY 5 MIN PRN
Status: DISCONTINUED | OUTPATIENT
Start: 2018-06-18 | End: 2018-06-18 | Stop reason: HOSPADM

## 2018-06-18 RX ORDER — MIDAZOLAM HYDROCHLORIDE 1 MG/ML
INJECTION INTRAMUSCULAR; INTRAVENOUS PRN
Status: DISCONTINUED | OUTPATIENT
Start: 2018-06-18 | End: 2018-06-18 | Stop reason: SDUPTHER

## 2018-06-18 RX ORDER — ROCURONIUM BROMIDE 10 MG/ML
INJECTION, SOLUTION INTRAVENOUS PRN
Status: DISCONTINUED | OUTPATIENT
Start: 2018-06-18 | End: 2018-06-18 | Stop reason: SDUPTHER

## 2018-06-18 RX ORDER — DEXAMETHASONE SODIUM PHOSPHATE 4 MG/ML
INJECTION, SOLUTION INTRA-ARTICULAR; INTRALESIONAL; INTRAMUSCULAR; INTRAVENOUS; SOFT TISSUE PRN
Status: DISCONTINUED | OUTPATIENT
Start: 2018-06-18 | End: 2018-06-18 | Stop reason: SDUPTHER

## 2018-06-18 RX ORDER — PROPOFOL 10 MG/ML
INJECTION, EMULSION INTRAVENOUS PRN
Status: DISCONTINUED | OUTPATIENT
Start: 2018-06-18 | End: 2018-06-18 | Stop reason: SDUPTHER

## 2018-06-18 RX ORDER — DIPHENHYDRAMINE HYDROCHLORIDE 50 MG/ML
12.5 INJECTION INTRAMUSCULAR; INTRAVENOUS
Status: DISCONTINUED | OUTPATIENT
Start: 2018-06-18 | End: 2018-06-18 | Stop reason: HOSPADM

## 2018-06-18 RX ORDER — BUPIVACAINE HYDROCHLORIDE 2.5 MG/ML
INJECTION, SOLUTION EPIDURAL; INFILTRATION; INTRACAUDAL PRN
Status: DISCONTINUED | OUTPATIENT
Start: 2018-06-18 | End: 2018-06-18 | Stop reason: HOSPADM

## 2018-06-18 RX ORDER — SODIUM CHLORIDE, SODIUM LACTATE, POTASSIUM CHLORIDE, CALCIUM CHLORIDE 600; 310; 30; 20 MG/100ML; MG/100ML; MG/100ML; MG/100ML
INJECTION, SOLUTION INTRAVENOUS CONTINUOUS
Status: DISCONTINUED | OUTPATIENT
Start: 2018-06-18 | End: 2018-06-18

## 2018-06-18 RX ORDER — PROMETHAZINE HYDROCHLORIDE 25 MG/ML
6.25 INJECTION, SOLUTION INTRAMUSCULAR; INTRAVENOUS
Status: DISCONTINUED | OUTPATIENT
Start: 2018-06-18 | End: 2018-06-18 | Stop reason: HOSPADM

## 2018-06-18 RX ORDER — NEOSTIGMINE METHYLSULFATE 5 MG/5 ML
SYRINGE (ML) INTRAVENOUS PRN
Status: DISCONTINUED | OUTPATIENT
Start: 2018-06-18 | End: 2018-06-18 | Stop reason: SDUPTHER

## 2018-06-18 RX ORDER — GLYCOPYRROLATE 1 MG/5 ML
SYRINGE (ML) INTRAVENOUS PRN
Status: DISCONTINUED | OUTPATIENT
Start: 2018-06-18 | End: 2018-06-18 | Stop reason: SDUPTHER

## 2018-06-18 RX ADMIN — ROCURONIUM BROMIDE 30 MG: 10 INJECTION INTRAVENOUS at 11:47

## 2018-06-18 RX ADMIN — METOPROLOL TARTRATE 100 MG: 100 TABLET ORAL at 06:17

## 2018-06-18 RX ADMIN — FENTANYL CITRATE 100 MCG: 50 INJECTION, SOLUTION INTRAMUSCULAR; INTRAVENOUS at 11:47

## 2018-06-18 RX ADMIN — Medication 10 ML: at 16:42

## 2018-06-18 RX ADMIN — MIDAZOLAM 4 MG: 1 INJECTION INTRAMUSCULAR; INTRAVENOUS at 11:39

## 2018-06-18 RX ADMIN — Medication 3 MG: at 20:40

## 2018-06-18 RX ADMIN — SODIUM CHLORIDE, POTASSIUM CHLORIDE, SODIUM LACTATE AND CALCIUM CHLORIDE: 600; 310; 30; 20 INJECTION, SOLUTION INTRAVENOUS at 10:40

## 2018-06-18 RX ADMIN — CLONAZEPAM 0.5 MG: 0.5 TABLET ORAL at 15:51

## 2018-06-18 RX ADMIN — Medication 0.3 MG: at 11:47

## 2018-06-18 RX ADMIN — ACETAMINOPHEN 650 MG: 325 TABLET, FILM COATED ORAL at 18:31

## 2018-06-18 RX ADMIN — CLONAZEPAM 0.5 MG: 0.5 TABLET ORAL at 20:40

## 2018-06-18 RX ADMIN — Medication 5 MG: at 12:05

## 2018-06-18 RX ADMIN — SIMVASTATIN 20 MG: 20 TABLET, FILM COATED ORAL at 20:40

## 2018-06-18 RX ADMIN — Medication 0.6 MG: at 12:05

## 2018-06-18 RX ADMIN — PROPOFOL 150 MG: 10 INJECTION, EMULSION INTRAVENOUS at 11:47

## 2018-06-18 RX ADMIN — DULOXETINE HYDROCHLORIDE 60 MG: 60 CAPSULE, DELAYED RELEASE ORAL at 15:52

## 2018-06-18 RX ADMIN — DEXAMETHASONE SODIUM PHOSPHATE 4 MG: 4 INJECTION, SOLUTION INTRAMUSCULAR; INTRAVENOUS at 11:55

## 2018-06-18 RX ADMIN — VANCOMYCIN HYDROCHLORIDE 1500 MG: 10 INJECTION, POWDER, LYOPHILIZED, FOR SOLUTION INTRAVENOUS at 16:42

## 2018-06-18 RX ADMIN — Medication 10 ML: at 20:40

## 2018-06-18 RX ADMIN — ONDANSETRON 4 MG: 2 INJECTION INTRAMUSCULAR; INTRAVENOUS at 11:55

## 2018-06-18 RX ADMIN — FLUDROCORTISONE ACETATE 0.1 MG: 0.1 TABLET ORAL at 15:52

## 2018-06-18 RX ADMIN — VANCOMYCIN HYDROCHLORIDE 1500 MG: 10 INJECTION, POWDER, LYOPHILIZED, FOR SOLUTION INTRAVENOUS at 02:58

## 2018-06-18 ASSESSMENT — PULMONARY FUNCTION TESTS
PIF_VALUE: 16
PIF_VALUE: 16
PIF_VALUE: 2
PIF_VALUE: 16
PIF_VALUE: 25
PIF_VALUE: 16
PIF_VALUE: 24
PIF_VALUE: 16
PIF_VALUE: 29
PIF_VALUE: 16
PIF_VALUE: 21
PIF_VALUE: 2
PIF_VALUE: 33
PIF_VALUE: 16
PIF_VALUE: 23
PIF_VALUE: 2
PIF_VALUE: 2
PIF_VALUE: 0
PIF_VALUE: 31
PIF_VALUE: 2
PIF_VALUE: 14
PIF_VALUE: 16
PIF_VALUE: 16
PIF_VALUE: 2
PIF_VALUE: 29
PIF_VALUE: 30
PIF_VALUE: 16
PIF_VALUE: 23

## 2018-06-18 ASSESSMENT — PAIN SCALES - GENERAL
PAINLEVEL_OUTOF10: 0
PAINLEVEL_OUTOF10: 0
PAINLEVEL_OUTOF10: 4

## 2018-06-18 ASSESSMENT — PAIN - FUNCTIONAL ASSESSMENT: PAIN_FUNCTIONAL_ASSESSMENT: 0-10

## 2018-06-19 VITALS
RESPIRATION RATE: 16 BRPM | SYSTOLIC BLOOD PRESSURE: 122 MMHG | BODY MASS INDEX: 37.91 KG/M2 | HEIGHT: 66 IN | DIASTOLIC BLOOD PRESSURE: 76 MMHG | OXYGEN SATURATION: 99 % | TEMPERATURE: 98.1 F | WEIGHT: 235.89 LBS | HEART RATE: 59 BPM

## 2018-06-19 LAB
BUN BLDV-MCNC: 9 MG/DL (ref 6–20)
CREAT SERPL-MCNC: 0.56 MG/DL (ref 0.5–0.9)
GFR AFRICAN AMERICAN: >60 ML/MIN
GFR NON-AFRICAN AMERICAN: >60 ML/MIN
GFR SERPL CREATININE-BSD FRML MDRD: NORMAL ML/MIN/{1.73_M2}
GFR SERPL CREATININE-BSD FRML MDRD: NORMAL ML/MIN/{1.73_M2}

## 2018-06-19 PROCEDURE — 6360000002 HC RX W HCPCS: Performed by: FAMILY MEDICINE

## 2018-06-19 PROCEDURE — 36415 COLL VENOUS BLD VENIPUNCTURE: CPT

## 2018-06-19 PROCEDURE — 84520 ASSAY OF UREA NITROGEN: CPT

## 2018-06-19 PROCEDURE — 6370000000 HC RX 637 (ALT 250 FOR IP): Performed by: FAMILY MEDICINE

## 2018-06-19 PROCEDURE — 82565 ASSAY OF CREATININE: CPT

## 2018-06-19 PROCEDURE — 2580000003 HC RX 258: Performed by: FAMILY MEDICINE

## 2018-06-19 PROCEDURE — 99233 SBSQ HOSP IP/OBS HIGH 50: CPT | Performed by: INTERNAL MEDICINE

## 2018-06-19 RX ORDER — HYDROCODONE BITARTRATE AND ACETAMINOPHEN 5; 325 MG/1; MG/1
1 TABLET ORAL EVERY 4 HOURS PRN
Qty: 20 TABLET | Refills: 0
Start: 2018-06-19 | End: 2018-06-22

## 2018-06-19 RX ORDER — DOXYCYCLINE HYCLATE 100 MG
100 TABLET ORAL 2 TIMES DAILY
Qty: 28 TABLET | Refills: 0 | Status: SHIPPED | OUTPATIENT
Start: 2018-06-19 | End: 2018-07-03

## 2018-06-19 RX ADMIN — Medication 10 ML: at 08:28

## 2018-06-19 RX ADMIN — METOPROLOL TARTRATE 100 MG: 100 TABLET ORAL at 08:25

## 2018-06-19 RX ADMIN — DULOXETINE HYDROCHLORIDE 60 MG: 60 CAPSULE, DELAYED RELEASE ORAL at 08:25

## 2018-06-19 RX ADMIN — VANCOMYCIN HYDROCHLORIDE 1500 MG: 10 INJECTION, POWDER, LYOPHILIZED, FOR SOLUTION INTRAVENOUS at 02:30

## 2018-06-19 RX ADMIN — PANTOPRAZOLE SODIUM 40 MG: 40 TABLET, DELAYED RELEASE ORAL at 06:05

## 2018-06-19 RX ADMIN — CLONAZEPAM 0.5 MG: 0.5 TABLET ORAL at 08:25

## 2018-06-19 RX ADMIN — VANCOMYCIN HYDROCHLORIDE 1500 MG: 10 INJECTION, POWDER, LYOPHILIZED, FOR SOLUTION INTRAVENOUS at 14:33

## 2018-06-19 RX ADMIN — FLUDROCORTISONE ACETATE 0.1 MG: 0.1 TABLET ORAL at 08:27

## 2018-06-19 ASSESSMENT — PAIN SCALES - GENERAL: PAINLEVEL_OUTOF10: 0

## 2018-06-21 LAB
CULTURE: ABNORMAL
CULTURE: ABNORMAL
CULTURE: NORMAL
CULTURE: NORMAL
DIRECT EXAM: ABNORMAL
DIRECT EXAM: ABNORMAL
Lab: ABNORMAL
Lab: NORMAL
Lab: NORMAL
ORGANISM: ABNORMAL
SPECIMEN DESCRIPTION: ABNORMAL
SPECIMEN DESCRIPTION: NORMAL
SPECIMEN DESCRIPTION: NORMAL
STATUS: ABNORMAL
STATUS: NORMAL
STATUS: NORMAL

## 2018-06-23 LAB
CULTURE: ABNORMAL
DIRECT EXAM: ABNORMAL
DIRECT EXAM: ABNORMAL
Lab: ABNORMAL
SPECIMEN DESCRIPTION: ABNORMAL
STATUS: ABNORMAL

## 2018-07-06 ENCOUNTER — HOSPITAL ENCOUNTER (OUTPATIENT)
Dept: WOMENS IMAGING | Age: 48
Discharge: HOME | End: 2018-07-08
Payer: COMMERCIAL

## 2018-07-06 DIAGNOSIS — Z12.39 SCREENING BREAST EXAMINATION: ICD-10-CM

## 2018-07-06 PROCEDURE — 77063 BREAST TOMOSYNTHESIS BI: CPT

## 2018-08-09 ENCOUNTER — HOSPITAL ENCOUNTER (EMERGENCY)
Age: 48
Discharge: HOME | End: 2018-08-09
Attending: EMERGENCY MEDICINE
Payer: COMMERCIAL

## 2018-08-09 ENCOUNTER — APPOINTMENT (OUTPATIENT)
Dept: CT IMAGING | Age: 48
End: 2018-08-09
Payer: COMMERCIAL

## 2018-08-09 VITALS
SYSTOLIC BLOOD PRESSURE: 129 MMHG | HEART RATE: 69 BPM | BODY MASS INDEX: 34.99 KG/M2 | HEIGHT: 65 IN | RESPIRATION RATE: 16 BRPM | OXYGEN SATURATION: 98 % | TEMPERATURE: 98.7 F | WEIGHT: 210 LBS | DIASTOLIC BLOOD PRESSURE: 56 MMHG

## 2018-08-09 DIAGNOSIS — A49.9 BACTERIAL UTI: Primary | ICD-10-CM

## 2018-08-09 DIAGNOSIS — N39.0 BACTERIAL UTI: Primary | ICD-10-CM

## 2018-08-09 LAB
-: ABNORMAL
ABSOLUTE EOS #: 0.3 K/UL (ref 0–0.4)
ABSOLUTE IMMATURE GRANULOCYTE: ABNORMAL K/UL (ref 0–0.3)
ABSOLUTE LYMPH #: 2.2 K/UL (ref 1–4.8)
ABSOLUTE MONO #: 0.6 K/UL (ref 0.1–1.3)
ALBUMIN SERPL-MCNC: 3.8 G/DL (ref 3.5–5.2)
ALBUMIN/GLOBULIN RATIO: ABNORMAL (ref 1–2.5)
ALP BLD-CCNC: 105 U/L (ref 35–104)
ALT SERPL-CCNC: 17 U/L (ref 5–33)
AMORPHOUS: ABNORMAL
ANION GAP SERPL CALCULATED.3IONS-SCNC: 12 MMOL/L (ref 9–17)
AST SERPL-CCNC: 28 U/L
BACTERIA: ABNORMAL
BASOPHILS # BLD: 1 % (ref 0–2)
BASOPHILS ABSOLUTE: 0.1 K/UL (ref 0–0.2)
BILIRUB SERPL-MCNC: 0.29 MG/DL (ref 0.3–1.2)
BILIRUBIN DIRECT: <0.08 MG/DL
BILIRUBIN URINE: ABNORMAL
BILIRUBIN, INDIRECT: ABNORMAL MG/DL (ref 0–1)
BUN BLDV-MCNC: 11 MG/DL (ref 6–20)
BUN/CREAT BLD: ABNORMAL (ref 9–20)
CALCIUM SERPL-MCNC: 9.4 MG/DL (ref 8.6–10.4)
CASTS UA: ABNORMAL /LPF
CHLORIDE BLD-SCNC: 101 MMOL/L (ref 98–107)
CO2: 25 MMOL/L (ref 20–31)
COLOR: ABNORMAL
COMMENT UA: ABNORMAL
CREAT SERPL-MCNC: 0.57 MG/DL (ref 0.5–0.9)
CRYSTALS, UA: ABNORMAL /HPF
DIFFERENTIAL TYPE: ABNORMAL
EOSINOPHILS RELATIVE PERCENT: 3 % (ref 0–4)
EPITHELIAL CELLS UA: ABNORMAL /HPF
GFR AFRICAN AMERICAN: >60 ML/MIN
GFR NON-AFRICAN AMERICAN: >60 ML/MIN
GFR SERPL CREATININE-BSD FRML MDRD: ABNORMAL ML/MIN/{1.73_M2}
GFR SERPL CREATININE-BSD FRML MDRD: ABNORMAL ML/MIN/{1.73_M2}
GLOBULIN: ABNORMAL G/DL (ref 1.5–3.8)
GLUCOSE BLD-MCNC: 113 MG/DL (ref 70–99)
GLUCOSE URINE: NEGATIVE
HCT VFR BLD CALC: 39 % (ref 36–46)
HEMOGLOBIN: 13 G/DL (ref 12–16)
IMMATURE GRANULOCYTES: ABNORMAL %
KETONES, URINE: NEGATIVE
LEUKOCYTE ESTERASE, URINE: NEGATIVE
LIPASE: 27 U/L (ref 13–60)
LYMPHOCYTES # BLD: 21 % (ref 24–44)
MCH RBC QN AUTO: 29.2 PG (ref 26–34)
MCHC RBC AUTO-ENTMCNC: 33.4 G/DL (ref 31–37)
MCV RBC AUTO: 87.2 FL (ref 80–100)
MONOCYTES # BLD: 6 % (ref 1–7)
MUCUS: ABNORMAL
NITRITE, URINE: NEGATIVE
NRBC AUTOMATED: ABNORMAL PER 100 WBC
OTHER OBSERVATIONS UA: ABNORMAL
PDW BLD-RTO: 13.3 % (ref 11.5–14.9)
PH UA: 5 (ref 5–8)
PLATELET # BLD: 238 K/UL (ref 150–450)
PLATELET ESTIMATE: ABNORMAL
PMV BLD AUTO: 7.4 FL (ref 6–12)
POTASSIUM SERPL-SCNC: 3.6 MMOL/L (ref 3.7–5.3)
PROTEIN UA: NEGATIVE
RBC # BLD: 4.47 M/UL (ref 4–5.2)
RBC # BLD: ABNORMAL 10*6/UL
RBC UA: ABNORMAL /HPF
RENAL EPITHELIAL, UA: ABNORMAL /HPF
SEG NEUTROPHILS: 69 % (ref 36–66)
SEGMENTED NEUTROPHILS ABSOLUTE COUNT: 7.4 K/UL (ref 1.3–9.1)
SODIUM BLD-SCNC: 138 MMOL/L (ref 135–144)
SPECIFIC GRAVITY UA: 1.03 (ref 1–1.03)
TOTAL PROTEIN: 7.4 G/DL (ref 6.4–8.3)
TRICHOMONAS: ABNORMAL
TURBIDITY: ABNORMAL
URINE HGB: NEGATIVE
UROBILINOGEN, URINE: NORMAL
WBC # BLD: 10.6 K/UL (ref 3.5–11)
WBC # BLD: ABNORMAL 10*3/UL
WBC UA: ABNORMAL /HPF
YEAST: ABNORMAL

## 2018-08-09 PROCEDURE — 83690 ASSAY OF LIPASE: CPT

## 2018-08-09 PROCEDURE — 80076 HEPATIC FUNCTION PANEL: CPT

## 2018-08-09 PROCEDURE — 6360000004 HC RX CONTRAST MEDICATION: Performed by: STUDENT IN AN ORGANIZED HEALTH CARE EDUCATION/TRAINING PROGRAM

## 2018-08-09 PROCEDURE — 99284 EMERGENCY DEPT VISIT MOD MDM: CPT

## 2018-08-09 PROCEDURE — 85025 COMPLETE CBC W/AUTO DIFF WBC: CPT

## 2018-08-09 PROCEDURE — 81001 URINALYSIS AUTO W/SCOPE: CPT

## 2018-08-09 PROCEDURE — 6370000000 HC RX 637 (ALT 250 FOR IP): Performed by: STUDENT IN AN ORGANIZED HEALTH CARE EDUCATION/TRAINING PROGRAM

## 2018-08-09 PROCEDURE — 36415 COLL VENOUS BLD VENIPUNCTURE: CPT

## 2018-08-09 PROCEDURE — 2580000003 HC RX 258: Performed by: STUDENT IN AN ORGANIZED HEALTH CARE EDUCATION/TRAINING PROGRAM

## 2018-08-09 PROCEDURE — 6360000002 HC RX W HCPCS: Performed by: STUDENT IN AN ORGANIZED HEALTH CARE EDUCATION/TRAINING PROGRAM

## 2018-08-09 PROCEDURE — 80048 BASIC METABOLIC PNL TOTAL CA: CPT

## 2018-08-09 PROCEDURE — 96374 THER/PROPH/DIAG INJ IV PUSH: CPT

## 2018-08-09 PROCEDURE — 74177 CT ABD & PELVIS W/CONTRAST: CPT

## 2018-08-09 RX ORDER — 0.9 % SODIUM CHLORIDE 0.9 %
1000 INTRAVENOUS SOLUTION INTRAVENOUS ONCE
Status: COMPLETED | OUTPATIENT
Start: 2018-08-09 | End: 2018-08-09

## 2018-08-09 RX ORDER — CIPROFLOXACIN 500 MG/1
500 TABLET, FILM COATED ORAL 2 TIMES DAILY
Qty: 14 TABLET | Refills: 0 | Status: SHIPPED | OUTPATIENT
Start: 2018-08-09 | End: 2018-08-16

## 2018-08-09 RX ORDER — KETOROLAC TROMETHAMINE 30 MG/ML
15 INJECTION, SOLUTION INTRAMUSCULAR; INTRAVENOUS ONCE
Status: COMPLETED | OUTPATIENT
Start: 2018-08-09 | End: 2018-08-09

## 2018-08-09 RX ORDER — SODIUM CHLORIDE 0.9 % (FLUSH) 0.9 %
10 SYRINGE (ML) INJECTION PRN
Status: DISCONTINUED | OUTPATIENT
Start: 2018-08-09 | End: 2018-08-10 | Stop reason: HOSPADM

## 2018-08-09 RX ORDER — 0.9 % SODIUM CHLORIDE 0.9 %
80 INTRAVENOUS SOLUTION INTRAVENOUS ONCE
Status: COMPLETED | OUTPATIENT
Start: 2018-08-09 | End: 2018-08-09

## 2018-08-09 RX ORDER — CIPROFLOXACIN 500 MG/1
500 TABLET, FILM COATED ORAL ONCE
Status: COMPLETED | OUTPATIENT
Start: 2018-08-09 | End: 2018-08-09

## 2018-08-09 RX ADMIN — Medication 10 ML: at 20:43

## 2018-08-09 RX ADMIN — IOPAMIDOL 75 ML: 755 INJECTION, SOLUTION INTRAVENOUS at 20:43

## 2018-08-09 RX ADMIN — SODIUM CHLORIDE 1000 ML: 9 INJECTION, SOLUTION INTRAVENOUS at 20:52

## 2018-08-09 RX ADMIN — SODIUM CHLORIDE 80 ML: 9 INJECTION, SOLUTION INTRAVENOUS at 20:43

## 2018-08-09 RX ADMIN — KETOROLAC TROMETHAMINE 15 MG: 30 INJECTION, SOLUTION INTRAMUSCULAR at 21:00

## 2018-08-09 RX ADMIN — CIPROFLOXACIN 500 MG: 500 TABLET, FILM COATED ORAL at 21:56

## 2018-08-09 ASSESSMENT — PAIN DESCRIPTION - DESCRIPTORS: DESCRIPTORS: CONSTANT;POUNDING

## 2018-08-09 ASSESSMENT — ENCOUNTER SYMPTOMS
RHINORRHEA: 0
NAUSEA: 1
ABDOMINAL PAIN: 1
EYE ITCHING: 0
EYE REDNESS: 0
DIARRHEA: 1
COUGH: 0
VOMITING: 0
BACK PAIN: 0
SHORTNESS OF BREATH: 0

## 2018-08-09 ASSESSMENT — PAIN SCALES - GENERAL
PAINLEVEL_OUTOF10: 5
PAINLEVEL_OUTOF10: 6
PAINLEVEL_OUTOF10: 5

## 2018-08-09 ASSESSMENT — PAIN DESCRIPTION - LOCATION
LOCATION: ABDOMEN
LOCATION: ABDOMEN

## 2018-08-09 ASSESSMENT — PAIN DESCRIPTION - PAIN TYPE
TYPE: ACUTE PAIN
TYPE: ACUTE PAIN

## 2018-08-09 ASSESSMENT — PAIN DESCRIPTION - ORIENTATION: ORIENTATION: LEFT

## 2018-08-09 NOTE — ED PROVIDER NOTES
[address] [facility]  Emergency Department Encounter  Emergency Medicine Resident     Pt Name: Maxi Jessica  MRN: [id number]  [place] 1970  Date of evaluation: 8/9/18  PCP:  LIZ Todd - CNP    CHIEF COMPLAINT       Chief Complaint   Patient presents with    Abdominal Pain     left side- previous perf bowel       HISTORY OF PRESENT ILLNESS  (Location/Symptom, Timing/Onset, Context/Setting, Quality, Duration, Modifying Factors, Severity.)      Maxi Jessica is a 50 y.o. female who presents with Left lower quadrant abdominal pain over the last 5 days. Patient has a history of diverticulitis with bowel perforation, received a diverting colostomy but has been placed back in continuity. Patient says that this pain is consistent with her diverticulitis pain, is worried that she is going to perforate again and it presented to the emergency department. She describes her pain as constant, nonradiating. Patient denies any bladder symptoms. Patient has never had a kidney stone in the past.  Patient does complain of fever and a 2 day history of diarrhea. She has no bloody bowel movements. No vaginal bleeding or discharge. PAST MEDICAL / SURGICAL / SOCIAL / FAMILY HISTORY      has a past medical history of Asthma; Bowel perforation ([address] Diverticula of intestine; Fatty liver; GERD (gastroesophageal reflux disease); Hepatic steatosis; Hepatomegaly; Hyperlipidemia; Nausea & vomiting; Osteoarthritis; and Vasodepressor syncope.     has a past surgical history that includes Endometrial ablation; Hysterectomy; sigmoidectomy; ventral hernia repair (09/25/14); Cholecystectomy, laparoscopic (09/25/14); other surgical history (Right, 7/2/15); Colonoscopy; joint replacement (Right); colostomy; and pr office/outpt visit,procedure only (N/A, 6/18/2018).     Social History     Social History    Marital status:      Spouse name: N/A    Number of children: N/A    Years of education: N/A     Occupational History    Not on file. Social History Main Topics    Smoking status: Never Smoker    Smokeless tobacco: Never Used    Alcohol use Yes      Comment: OCCASIONAL    Drug use: No    Sexual activity: Not on file     Other Topics Concern    Not on file     Social History Narrative    No narrative on file       Family History   Problem Relation Age of Onset    Diabetes Mother     High Blood Pressure Mother     High Cholesterol Mother     Osteoporosis Mother     COPD Father     Coronary Art Dis Father     Cancer Father         lung    Breast Cancer Maternal Grandmother        Allergies:  Bactrim [sulfamethoxazole-trimethoprim]; Dilaudid [hydromorphone]; Morphine and related; Sulfa antibiotics; Keflex [cephalexin]; Penicillins; Talwin [pentazocine]; Codeine; and Fluconazole    Home Medications:  Prior to Admission medications    Medication Sig Start Date End Date Taking? Authorizing Provider   ciprofloxacin (CIPRO) 500 MG tablet Take 1 tablet by mouth 2 times daily for 7 days 8/9/18 8/16/18 Yes Ginny Ponce MD   doxycycline hyclate (VIBRAMYCIN) 100 MG capsule Take 100 mg by mouth 2 times daily    Historical Provider, MD   acetaminophen (TYLENOL) 500 MG tablet Take 1 tablet by mouth every 6 hours as needed for Pain Do not take concurrently with [facility] nodilaAFFiRiS.  6/14/18   Gautam Meade MD   DULoxetine (CYMBALTA) 60 MG extended release capsule TAKE ONE (1) CAPSULE BY MOUTH ONCE DAILY 8/9/17   Melquiades Pagan MD   clonazePAM (KLONOPIN) 0.5 MG tablet TAKE ONE (1) TABLET BY MOUTH TWO (2) TIMES DAILY 7/6/17   Melquiades Pagan MD   pantoprazole (PROTONIX) 40 MG tablet TAKE ONE TABLET BY MOUTH DAILY 4/27/17   Melquiades Pagan MD   simvastatin (ZOCOR) 20 MG tablet TAKE 1 TABLET DAILY AT BEDTIME 2/13/17   Melquiades Pagan MD   PREMARIN 1.25 MG tablet 1 tablet daily 2/29/16   Historical Provider, MD   melatonin 3 MG TABS tablet Take 3 mg by mouth nightly    Historical Provider, MD   metoprolol (LOPRESSOR) 100 MG tablet Take 100 mg by mouth daily. Historical Provider, MD   fludrocortisone (FLORINEF) 0.1 MG tablet Take 0.1 mg by mouth daily. Historical Provider, MD   calcium carbonate (OSCAL) 500 MG TABS tablet Take 1,000 mg by mouth daily. Historical Provider, MD   albuterol (PROVENTIL HFA;VENTOLIN HFA) 108 (90 BASE) MCG/ACT inhaler Inhale 2 puffs into the lungs every 6 hours as needed for Wheezing or Shortness of Breath. Historical Provider, MD       REVIEW OF SYSTEMS    (2-9 systems for level 4, 10 or more for level 5)      Review of Systems   Constitutional: Negative for chills and fever. HENT: Negative for congestion and rhinorrhea. Eyes: Negative for redness and itching. Respiratory: Negative for cough and shortness of breath. Cardiovascular: Negative for chest pain, palpitations and leg swelling. Gastrointestinal: Positive for abdominal pain, diarrhea and nausea. Negative for vomiting. Genitourinary: Negative for dysuria and frequency. Musculoskeletal: Negative for back pain, joint swelling and myalgias. Skin: Negative for pallor and rash. Neurological: Negative for dizziness, weakness, light-headedness, numbness and headaches. PHYSICAL EXAM   (up to 7 for level 4, 8 or more for level 5)      INITIAL VITALS:   BP (!) 129/56   Pulse 69   Temp 98.7 °F (37.1 °C) (Oral)   Resp 16   Ht 5' 5\" (1.651 m)   Wt 210 lb (95.3 kg)   SpO2 98%   BMI 34.95 kg/m²     Physical Exam   Constitutional: She is oriented to person, place, and time. She appears well-developed and well-nourished. No distress. HENT:   Head: Normocephalic and atraumatic. Eyes: EOM are normal. Pupils are equal, round, and reactive to light. Cardiovascular: Normal rate, regular rhythm and normal heart sounds. Pulmonary/Chest: Effort normal and breath sounds normal. No respiratory distress. Abdominal: Soft. Bowel sounds are normal. She exhibits no distension. There is tenderness (diffuse).  There is no rebound and no guarding. Musculoskeletal: Normal range of motion. Neurological: She is alert and oriented to person, place, and time. Skin: Skin is warm. No rash noted.        DIFFERENTIAL  DIAGNOSIS     PLAN (LABS / [place]re [place] / EKG):  Orders Placed This Encounter   Procedures    CT ABDOMEN PELVIS W IV CONTRAST Additional Contrast? None    CBC Auto Differential    Basic Metabolic Panel    Hepatic Function Panel    Lipase    Urinalysis with Microscopic    ICTOTEST, URINE       MEDICATIONS ORDERED:  Orders Placed This Encounter   Medications    0.9 % sodium chloride bolus    iopamidol (ISOVUE-370) 76 % injection 75 mL    0.9 % sodium chloride bolus    DISCONTD: sodium chloride flush 0.9 % injection 10 mL    ketorolac (TORADOL) injection 15 mg    ciprofloxacin (CIPRO) tablet 500 mg    ciprofloxacin (CIPRO) 500 MG tablet     Sig: Take 1 tablet by mouth 2 times daily for 7 days     Dispense:  14 tablet     Refill:  0       DDX: GERD, PUD, pancreatitis, cholecystitis, GB colic, cholangitis, Jycw-Tari-Dgltwk, SBO, DKA, AAA, mesenteric ischemia, perforated viscous, acute gastroenteritis, NSAP, pyelonephritis, kidney stone, appendicitis, hernia, UTI, constipation, ectopic, ovarian torsion, ovarian cyst, PID, tuboovarian abscess, period/ fibroid      DIAGNOSTIC RESULTS / EMERGENCY DEPARTMENT COURSE / MDM     LABS:  Results for orders placed or performed during the hospital encounter of 08/09/18   CBC Auto Differential   Result Value Ref Range    WBC 10.6 3.5 - 11.0 k/uL    RBC 4.47 4.0 - 5.2 m/uL    Hemoglobin 13.0 12.0 - 16.0 g/dL    Hematocrit 39.0 36 - 46 %    MCV 87.2 80 - 100 fL    MCH 29.2 26 - 34 pg    MCHC 33.4 31 - 37 g/dL    RDW 13.3 11.5 - 14.9 %    Platelets 961 728 - 755 k/uL    MPV 7.4 6.0 - 12.0 fL    NRBC Automated NOT REPORTED per 100 WBC    Differential Type NOT REPORTED     Seg Neutrophils 69 (H) 36 - 66 %    Lymphocytes 21 (L) 24 - 44 %    Monocytes 6 1 - 7 %    Eosinophils % 3 0 - 4 %    Basophils 1 0 -

## 2018-09-20 ENCOUNTER — HOSPITAL ENCOUNTER (OUTPATIENT)
Age: 48
Discharge: HOME | End: 2018-09-20
Payer: COMMERCIAL

## 2018-09-20 LAB
ABSOLUTE EOS #: 0.5 K/UL (ref 0–0.4)
ABSOLUTE IMMATURE GRANULOCYTE: ABNORMAL K/UL (ref 0–0.3)
ABSOLUTE LYMPH #: 2 K/UL (ref 1–4.8)
ABSOLUTE MONO #: 0.4 K/UL (ref 0.1–1.3)
ANION GAP SERPL CALCULATED.3IONS-SCNC: 14 MMOL/L (ref 9–17)
BASOPHILS # BLD: 1 % (ref 0–2)
BASOPHILS ABSOLUTE: 0.1 K/UL (ref 0–0.2)
BUN BLDV-MCNC: 15 MG/DL (ref 6–20)
BUN/CREAT BLD: ABNORMAL (ref 9–20)
CALCIUM SERPL-MCNC: 9.3 MG/DL (ref 8.6–10.4)
CHLORIDE BLD-SCNC: 101 MMOL/L (ref 98–107)
CHOLESTEROL: 196 MG/DL
CO2: 26 MMOL/L (ref 20–31)
CREAT SERPL-MCNC: 0.68 MG/DL (ref 0.5–0.9)
DIFFERENTIAL TYPE: ABNORMAL
EOSINOPHILS RELATIVE PERCENT: 5 % (ref 0–4)
ESTIMATED AVERAGE GLUCOSE: 134 MG/DL
FOLATE: 14.5 NG/ML
GFR AFRICAN AMERICAN: >60 ML/MIN
GFR NON-AFRICAN AMERICAN: >60 ML/MIN
GFR SERPL CREATININE-BSD FRML MDRD: ABNORMAL ML/MIN/{1.73_M2}
GFR SERPL CREATININE-BSD FRML MDRD: ABNORMAL ML/MIN/{1.73_M2}
GLUCOSE BLD-MCNC: 102 MG/DL (ref 70–99)
HBA1C MFR BLD: 6.3 % (ref 4–6)
HCT VFR BLD CALC: 42.4 % (ref 36–46)
HEMOGLOBIN: 13.9 G/DL (ref 12–16)
IMMATURE GRANULOCYTES: ABNORMAL %
LYMPHOCYTES # BLD: 23 % (ref 24–44)
MCH RBC QN AUTO: 28.9 PG (ref 26–34)
MCHC RBC AUTO-ENTMCNC: 32.8 G/DL (ref 31–37)
MCV RBC AUTO: 88.1 FL (ref 80–100)
MONOCYTES # BLD: 5 % (ref 1–7)
NRBC AUTOMATED: ABNORMAL PER 100 WBC
PDW BLD-RTO: 14.1 % (ref 11.5–14.9)
PLATELET # BLD: 264 K/UL (ref 150–450)
PLATELET ESTIMATE: ABNORMAL
PMV BLD AUTO: 7.2 FL (ref 6–12)
POTASSIUM SERPL-SCNC: 3.9 MMOL/L (ref 3.7–5.3)
RBC # BLD: 4.81 M/UL (ref 4–5.2)
RBC # BLD: ABNORMAL 10*6/UL
SEG NEUTROPHILS: 66 % (ref 36–66)
SEGMENTED NEUTROPHILS ABSOLUTE COUNT: 5.8 K/UL (ref 1.3–9.1)
SODIUM BLD-SCNC: 141 MMOL/L (ref 135–144)
T3 FREE: 3.33 PG/ML (ref 2.02–4.43)
THYROXINE, FREE: 0.87 NG/DL (ref 0.93–1.7)
TSH SERPL DL<=0.05 MIU/L-ACNC: 3.38 MIU/L (ref 0.3–5)
TSH SERPL DL<=0.05 MIU/L-ACNC: 3.38 MIU/L (ref 0.3–5)
VITAMIN B-12: 438 PG/ML (ref 232–1245)
VITAMIN D 25-HYDROXY: 39.2 NG/ML (ref 30–100)
VITAMIN D 25-HYDROXY: 40.9 NG/ML (ref 30–100)
WBC # BLD: 8.8 K/UL (ref 3.5–11)
WBC # BLD: ABNORMAL 10*3/UL

## 2018-09-20 PROCEDURE — 82465 ASSAY BLD/SERUM CHOLESTEROL: CPT

## 2018-09-20 PROCEDURE — 84439 ASSAY OF FREE THYROXINE: CPT

## 2018-09-20 PROCEDURE — 84443 ASSAY THYROID STIM HORMONE: CPT

## 2018-09-20 PROCEDURE — 82306 VITAMIN D 25 HYDROXY: CPT

## 2018-09-20 PROCEDURE — 80048 BASIC METABOLIC PNL TOTAL CA: CPT

## 2018-09-20 PROCEDURE — 84481 FREE ASSAY (FT-3): CPT

## 2018-09-20 PROCEDURE — 36415 COLL VENOUS BLD VENIPUNCTURE: CPT

## 2018-09-20 PROCEDURE — 85025 COMPLETE CBC W/AUTO DIFF WBC: CPT

## 2018-09-20 PROCEDURE — 82746 ASSAY OF FOLIC ACID SERUM: CPT

## 2018-09-20 PROCEDURE — 83036 HEMOGLOBIN GLYCOSYLATED A1C: CPT

## 2018-09-20 PROCEDURE — 82607 VITAMIN B-12: CPT

## 2018-12-27 ENCOUNTER — HOSPITAL ENCOUNTER (OUTPATIENT)
Dept: CARDIAC CATH/INVASIVE PROCEDURES | Age: 48
Discharge: HOME | End: 2018-12-27
Payer: COMMERCIAL

## 2018-12-27 VITALS
SYSTOLIC BLOOD PRESSURE: 119 MMHG | DIASTOLIC BLOOD PRESSURE: 65 MMHG | OXYGEN SATURATION: 98 % | HEART RATE: 61 BPM | BODY MASS INDEX: 34.55 KG/M2 | HEIGHT: 66 IN | TEMPERATURE: 98.5 F | RESPIRATION RATE: 16 BRPM | WEIGHT: 215 LBS

## 2018-12-27 LAB
BUN BLDV-MCNC: 12 MG/DL (ref 6–20)
GFR NON-AFRICAN AMERICAN: >60 ML/MIN
GFR SERPL CREATININE-BSD FRML MDRD: >60 ML/MIN
GFR SERPL CREATININE-BSD FRML MDRD: NORMAL ML/MIN/{1.73_M2}
GLUCOSE BLD-MCNC: 106 MG/DL (ref 74–100)
LV EF: 43 %
LVEF MODALITY: NORMAL
PLATELET # BLD: 255 K/UL (ref 138–453)
POC CHLORIDE: 105 MMOL/L (ref 98–107)
POC CREATININE: 0.61 MG/DL (ref 0.51–1.19)
POC HEMATOCRIT: 44 % (ref 36–46)
POC HEMOGLOBIN: 15 G/DL (ref 12–16)
POC POTASSIUM: 4.6 MMOL/L (ref 3.5–4.5)
POC SODIUM: 143 MMOL/L (ref 138–146)

## 2018-12-27 PROCEDURE — 93458 L HRT ARTERY/VENTRICLE ANGIO: CPT | Performed by: INTERNAL MEDICINE

## 2018-12-27 PROCEDURE — 85014 HEMATOCRIT: CPT

## 2018-12-27 PROCEDURE — 84295 ASSAY OF SERUM SODIUM: CPT

## 2018-12-27 PROCEDURE — 85049 AUTOMATED PLATELET COUNT: CPT

## 2018-12-27 PROCEDURE — 6360000004 HC RX CONTRAST MEDICATION

## 2018-12-27 PROCEDURE — C1887 CATHETER, GUIDING: HCPCS

## 2018-12-27 PROCEDURE — 84132 ASSAY OF SERUM POTASSIUM: CPT

## 2018-12-27 PROCEDURE — 7100000000 HC PACU RECOVERY - FIRST 15 MIN

## 2018-12-27 PROCEDURE — 82565 ASSAY OF CREATININE: CPT

## 2018-12-27 PROCEDURE — 2500000003 HC RX 250 WO HCPCS

## 2018-12-27 PROCEDURE — 2709999900 HC NON-CHARGEABLE SUPPLY

## 2018-12-27 PROCEDURE — C1769 GUIDE WIRE: HCPCS

## 2018-12-27 PROCEDURE — 82947 ASSAY GLUCOSE BLOOD QUANT: CPT

## 2018-12-27 PROCEDURE — 82435 ASSAY OF BLOOD CHLORIDE: CPT

## 2018-12-27 PROCEDURE — 6360000002 HC RX W HCPCS

## 2018-12-27 PROCEDURE — C1894 INTRO/SHEATH, NON-LASER: HCPCS

## 2018-12-27 PROCEDURE — 7100000011 HC PHASE II RECOVERY - ADDTL 15 MIN

## 2018-12-27 PROCEDURE — 84520 ASSAY OF UREA NITROGEN: CPT

## 2018-12-27 RX ORDER — ACETAMINOPHEN 325 MG/1
650 TABLET ORAL EVERY 4 HOURS PRN
Status: DISCONTINUED | OUTPATIENT
Start: 2018-12-27 | End: 2018-12-28 | Stop reason: HOSPADM

## 2018-12-27 RX ORDER — SODIUM CHLORIDE 0.9 % (FLUSH) 0.9 %
10 SYRINGE (ML) INJECTION EVERY 12 HOURS SCHEDULED
Status: DISCONTINUED | OUTPATIENT
Start: 2018-12-27 | End: 2018-12-28 | Stop reason: HOSPADM

## 2018-12-27 RX ORDER — ALENDRONATE SODIUM 70 MG/1
70 TABLET ORAL
COMMUNITY

## 2018-12-27 RX ORDER — LISINOPRIL 2.5 MG/1
2.5 TABLET ORAL DAILY
Qty: 30 TABLET | Refills: 1 | Status: SHIPPED | OUTPATIENT
Start: 2018-12-27 | End: 2020-02-26

## 2018-12-27 RX ORDER — EZETIMIBE 10 MG/1
10 TABLET ORAL DAILY
COMMUNITY

## 2018-12-27 RX ORDER — SODIUM CHLORIDE 9 MG/ML
INJECTION, SOLUTION INTRAVENOUS CONTINUOUS
Status: DISCONTINUED | OUTPATIENT
Start: 2018-12-27 | End: 2018-12-28 | Stop reason: HOSPADM

## 2018-12-27 RX ORDER — SODIUM CHLORIDE 0.9 % (FLUSH) 0.9 %
10 SYRINGE (ML) INJECTION PRN
Status: DISCONTINUED | OUTPATIENT
Start: 2018-12-27 | End: 2018-12-28 | Stop reason: HOSPADM

## 2018-12-27 RX ORDER — LAMOTRIGINE 25 MG/1
25 TABLET ORAL DAILY
Status: ON HOLD | COMMUNITY
End: 2020-05-01 | Stop reason: ALTCHOICE

## 2018-12-27 RX ORDER — ZOLPIDEM TARTRATE 5 MG/1
5 TABLET ORAL
COMMUNITY
End: 2019-06-17

## 2018-12-27 RX ADMIN — SODIUM CHLORIDE: 9 INJECTION, SOLUTION INTRAVENOUS at 11:38

## 2018-12-27 NOTE — PROGRESS NOTES
TR band removed per Tank Clifton . BRANDON RN without complications. Dressing and arm board applied per Tank Clifton. Ambulated to bathroom and in halls. Gait steady. Assessment unchanged.

## 2018-12-27 NOTE — H&P
[facility]  Pre- Procedure History and Physical/Update          Patient Name:  Natalee Ortega  MRN:    [id number]  YOB: 1970  Date of evaluation:  12/27/2018       Please refer to the consult note / H&P completed by Dr. Gil Miller on 11/29/2018 in the medical record and note that:       [x] I have examined the patient and reviewed the H&P/Consult and there are no changes to be made to the assessment or plan. @ pt has been feeling tired and atypical chest pain and stress test showed inferior basal and basal septal ischemia with new onset systolic CHF with GLOBAL Hypokinesis with inferior septal hypokinesis with EF OF 35%       She feels increased frequency of palpitation at night and has H/O  PVCs in past and seeing dr Naila Li    @ denies any allergy to contrast, any GI,  bleed or upcoming surgery in future. Past Medical History:   Diagnosis Date    Asthma     Bowel perforation (HCC)     Diverticula of intestine     Fatty liver     GERD (gastroesophageal reflux disease)     Hepatic steatosis     Hepatomegaly     Hyperlipidemia     Nausea & vomiting     Osteoarthritis     Vasodepressor syncope        Past Surgical History:   Procedure Laterality Date    CHOLECYSTECTOMY, LAPAROSCOPIC  09/25/14    COLONOSCOPY      COLOSTOMY      AND REVERSAL after bowel perforation    ENDOMETRIAL ABLATION      HYSTERECTOMY      JOINT REPLACEMENT Right     PARTIAL knee    OTHER SURGICAL HISTORY Right 7/2/15    exploration radial laceration    [place] OFFICE/OUTPT VISIT,PROCEDURE ONLY N/A 6/18/2018    NECK ABSCESS I & D POSTERIOR performed by Yaquelin Murphy MD at [address] [place] [address] Drive SIGMOIDECTOMY      Sigmoid Colectomy and reversal    VENTRAL HERNIA REPAIR  09/25/14    open with mesh        reports that she has never smoked. She has never used smokeless tobacco. She reports that she drinks alcohol. She reports that she does not use drugs.     Prior to Admission medications    Medication

## 2019-06-03 DIAGNOSIS — M25.562 LEFT KNEE PAIN, UNSPECIFIED CHRONICITY: Primary | ICD-10-CM

## 2019-06-07 ENCOUNTER — OFFICE VISIT (OUTPATIENT)
Dept: ORTHOPEDIC SURGERY | Age: 49
End: 2019-06-07
Payer: COMMERCIAL

## 2019-06-07 VITALS — WEIGHT: 214.95 LBS | HEIGHT: 66 IN | BODY MASS INDEX: 34.55 KG/M2

## 2019-06-07 DIAGNOSIS — G89.29 CHRONIC PAIN OF BOTH KNEES: Primary | ICD-10-CM

## 2019-06-07 DIAGNOSIS — M25.561 CHRONIC PAIN OF BOTH KNEES: Primary | ICD-10-CM

## 2019-06-07 DIAGNOSIS — M17.12 ARTHRITIS OF LEFT KNEE: ICD-10-CM

## 2019-06-07 DIAGNOSIS — M25.562 CHRONIC PAIN OF BOTH KNEES: Primary | ICD-10-CM

## 2019-06-07 DIAGNOSIS — M25.562 LEFT KNEE PAIN, UNSPECIFIED CHRONICITY: Primary | ICD-10-CM

## 2019-06-07 PROCEDURE — G8419 CALC BMI OUT NRM PARAM NOF/U: HCPCS | Performed by: ORTHOPAEDIC SURGERY

## 2019-06-07 PROCEDURE — 99203 OFFICE O/P NEW LOW 30 MIN: CPT | Performed by: ORTHOPAEDIC SURGERY

## 2019-06-07 PROCEDURE — 1036F TOBACCO NON-USER: CPT | Performed by: ORTHOPAEDIC SURGERY

## 2019-06-07 PROCEDURE — 3017F COLORECTAL CA SCREEN DOC REV: CPT | Performed by: ORTHOPAEDIC SURGERY

## 2019-06-07 PROCEDURE — G8427 DOCREV CUR MEDS BY ELIG CLIN: HCPCS | Performed by: ORTHOPAEDIC SURGERY

## 2019-06-07 NOTE — PROGRESS NOTES
Regino Mann M.D.            [address] [facility] [facility], [address] [facility]             Dept Phone: [phone]             Dept Fax:  [phone]  Sean Sweeney returns today. Haven't seen her for some time. She had a previous [place] uni- compartment arthroplasty of her right knee back in 2007. She states she's had a little bit of pain with this but nothing too severe. She is actually here for her left knee. I've seen her approximately 3 years ago for this and told her that she is likely to need arthroplasty that side. She is more or less put off to the point where now she is barely able to ambulate on her left side. She has had injections in the past as well as nonsteroidals. Examination the patient's left knee notes that her motion is 0-130 degrees. She does have a varus disposition. She has good collateral stability however. She has pretty significant crepitus medially. Nothing on Norman's. She does have a fair amount of patellar pain with medial lateral subluxation. No hip rotational pain. Examination patient's right knee notes a slight varus disposition motion remains good 0-130 degrees. Some slight joint line tenderness medially but nothing too remarkable. Minimal patellofemoral pain. XR taken today:  Xr Knee Bilateral Standing    Result Date: 6/7/2019  X-rays taken today reviewed by me show standing AP of both knees and lateral of both knees. Patient is a previous partial unit compartment arthroplasty on the right side. The components appear to be well situated. I question whether the some subtle lucency on the tibial implant beneath however appears to be relatively stable comparative views reviewed in the past.  Polyethylene remains well in place. She's maintain her lateral compartment very nicely.   X-rays of her left knee show severe degenerative joint disease of the medial compartment with bone-on-bone apposition. She does have some early spur formation both laterally as well as in the patellofemoral joint. Impression  Status post right [place] unicompartmental arthroplasty 2007  Severe DJD left knee    Plan  Patient was advised that do not think she is a candidate for a partial arthroplasty on this side is that she does have a fair amount of patellofemoral pain. She does wish to proceed with this associated gets back from a trip [place]. She has no major medical comorbidities. She's been through this before. We'll get her scheduled accordingly. Review of Systems   Constitutional: Negative. HENT: Negative. Respiratory: Negative. Cardiovascular: Negative. Neurological: Negative. Musculoskeletal:   Knee Pain (bilateral knee pain- left > right, partial on right knee done in 2008)          Current Outpatient Medications:     zolpidem (AMBIEN) 5 MG tablet, Take 5 mg by mouth. ., Disp: , Rfl:     lamoTRIgine (LAMICTAL) 25 MG tablet, Take 25 mg by mouth daily, Disp: , Rfl:     ezetimibe (ZETIA) 10 MG tablet, Take 10 mg by mouth daily, Disp: , Rfl:     alendronate (FOSAMAX) 70 MG tablet, Take 70 mg by mouth every 7 days, Disp: , Rfl:     aspirin 81 MG tablet, Take 81 mg by mouth daily, Disp: , Rfl:     lisinopril (PRINIVIL;ZESTRIL) 2.5 MG tablet, Take 1 tablet by mouth daily, Disp: 30 tablet, Rfl: 1    doxycycline hyclate (VIBRAMYCIN) 100 MG capsule, Take 100 mg by mouth 2 times daily, Disp: , Rfl:     DULoxetine (CYMBALTA) 60 MG extended release capsule, TAKE ONE (1) CAPSULE BY MOUTH ONCE DAILY, Disp: 90 capsule, Rfl: 2    clonazePAM (KLONOPIN) 0.5 MG tablet, TAKE ONE (1) TABLET BY MOUTH TWO (2) TIMES DAILY, Disp: 60 tablet, Rfl: 1    pantoprazole (PROTONIX) 40 MG tablet, TAKE ONE TABLET BY MOUTH DAILY, Disp: 90 tablet, Rfl: 3    simvastatin (ZOCOR) 20 MG tablet, TAKE 1 TABLET DAILY AT BEDTIME, Disp: 90 tablet, Rfl: 3    PREMARIN 1.25 MG tablet, 1 tablet daily, Disp: , Rfl:    metoprolol (LOPRESSOR) 100 MG tablet, Take 100 mg by mouth daily. , Disp: , Rfl:     fludrocortisone (FLORINEF) 0.1 MG tablet, Take 0.1 mg by mouth daily. , Disp: , Rfl:     calcium carbonate (OSCAL) 500 MG TABS tablet, Take 1,000 mg by mouth daily. , Disp: , Rfl:     albuterol (PROVENTIL HFA;VENTOLIN HFA) 108 (90 BASE) MCG/ACT inhaler, Inhale 2 puffs into the lungs every 6 hours as needed for Wheezing or Shortness of Breath., Disp: , Rfl:     Allergies   Allergen Reactions    Bactrim [Sulfamethoxazole-Trimethoprim] Anaphylaxis    Dilaudid [Hydromorphone] Rash    Morphine And Related Shortness Of Breath, Nausea And Vomiting and Rash    Sulfa Antibiotics Hives, Swelling and Rash    Keflex [Cephalexin] Itching     Patient also states a yeast infection developes    Penicillins Itching    Talwin [Pentazocine]     Codeine Nausea And Vomiting    Fluconazole Itching and Rash       Social History     Socioeconomic History    Marital status:      Spouse name: Not on file    Number of children: Not on file    Years of education: Not on file    Highest education level: Not on file   Occupational History    Not on file   Social Needs    Financial resource strain: Not on file    Food insecurity:     Worry: Not on file     Inability: Not on file    Transportation needs:     Medical: Not on file     Non-medical: Not on file   Tobacco Use    Smoking status: Never Smoker    Smokeless tobacco: Never Used   Substance and Sexual Activity    Alcohol use: Yes     Comment: OCCASIONAL    Drug use: No    Sexual activity: Not on file   Lifestyle    Physical activity:     Days per week: Not on file     Minutes per session: Not on file    Stress: Not on file   Relationships    Social connections:     Talks on phone: Not on file     Gets together: Not on file     Attends Ca[employer] service: Not on file     Active member of club or organization: Not on file     Attends meetings of clubs or organizations: Not on file     Relationship status: Not on file    Intimate partner violence:     Fear of current or ex partner: Not on file     Emotionally abused: Not on file     Physically abused: Not on file     Forced sexual activity: Not on file   Other Topics Concern    Not on file   Social History Narrative    Not on file       Past Medical History:   Diagnosis Date    Asthma     Bowel perforation ([address]     Diverticula of intestine     Fatty liver     GERD (gastroesophageal reflux disease)     Hepatic steatosis     Hepatomegaly     Hyperlipidemia     Nausea & vomiting     Osteoarthritis     Vasodepressor syncope      Past Surgical History:   Procedure Laterality Date    CHOLECYSTECTOMY, LAPAROSCOPIC  09/25/14    COLONOSCOPY      COLOSTOMY      AND REVERSAL after bowel perforation    ENDOMETRIAL ABLATION      HYSTERECTOMY      JOINT REPLACEMENT Right     PARTIAL knee    OTHER SURGICAL HISTORY Right 7/2/15    exploration radial laceration    [place] OFFICE/OUTPT VISIT,PROCEDURE ONLY N/A 6/18/2018    NECK ABSCESS I & D POSTERIOR performed by Carl Amaya MD at [address] [place] [address]      Sigmoid Colectomy and reversal    VENTRAL HERNIA REPAIR  09/25/14    open with mesh     Family History   Problem Relation Age of Onset    Diabetes Mother     High Blood Pressure Mother     High Cholesterol Mother     Osteoporosis Mother     COPD Father     Coronary Art Dis Father     Cancer Father         lung    Breast Cancer Maternal Grandmother            Orders Placed This Encounter   Procedures    XR Knee Bilateral Standing     Standing Status:   Future     Number of Occurrences:   1     Standing Expiration Date:   6/6/2020       1. Chronic pain of both knees    2. Arthritis of left knee            Joe Arnett MD    Please note that this chart was generated using voice recognition Dragon dictation software.   Although every effort was made to ensure the accuracy of this automated transcription, some errors in transcription may have occurred.

## 2019-06-10 RX ORDER — TRAMADOL HYDROCHLORIDE 50 MG/1
50 TABLET ORAL EVERY 6 HOURS PRN
Qty: 40 TABLET | Refills: 0 | Status: SHIPPED | OUTPATIENT
Start: 2019-06-10 | End: 2019-06-17 | Stop reason: SINTOL

## 2019-06-14 DIAGNOSIS — M17.12 ARTHRITIS OF LEFT KNEE: Primary | ICD-10-CM

## 2019-06-17 ENCOUNTER — HOSPITAL ENCOUNTER (OUTPATIENT)
Age: 49
Discharge: HOME | End: 2019-06-17
Payer: COMMERCIAL

## 2019-06-17 ENCOUNTER — HOSPITAL ENCOUNTER (OUTPATIENT)
Dept: PREADMISSION TESTING | Age: 49
Discharge: HOME | End: 2019-06-21
Payer: COMMERCIAL

## 2019-06-17 VITALS
TEMPERATURE: 97.2 F | SYSTOLIC BLOOD PRESSURE: 130 MMHG | HEART RATE: 68 BPM | DIASTOLIC BLOOD PRESSURE: 86 MMHG | RESPIRATION RATE: 18 BRPM

## 2019-06-17 DIAGNOSIS — M17.10 OSTEOARTHRITIS OF KNEE, UNILATERAL: ICD-10-CM

## 2019-06-17 DIAGNOSIS — Z01.818 PRE-OP TESTING: Primary | ICD-10-CM

## 2019-06-17 LAB
ANION GAP SERPL CALCULATED.3IONS-SCNC: 12 MMOL/L (ref 9–17)
BILIRUBIN URINE: NEGATIVE
BUN BLDV-MCNC: 11 MG/DL (ref 6–20)
BUN/CREAT BLD: NORMAL (ref 9–20)
CALCIUM SERPL-MCNC: 9.6 MG/DL (ref 8.6–10.4)
CHLORIDE BLD-SCNC: 101 MMOL/L (ref 98–107)
CO2: 27 MMOL/L (ref 20–31)
COLOR: YELLOW
COMMENT UA: ABNORMAL
CREAT SERPL-MCNC: 0.54 MG/DL (ref 0.5–0.9)
CULTURE: NORMAL
CULTURE: NORMAL
GFR AFRICAN AMERICAN: >60 ML/MIN
GFR NON-AFRICAN AMERICAN: >60 ML/MIN
GFR SERPL CREATININE-BSD FRML MDRD: NORMAL ML/MIN/{1.73_M2}
GFR SERPL CREATININE-BSD FRML MDRD: NORMAL ML/MIN/{1.73_M2}
GLUCOSE BLD-MCNC: 99 MG/DL (ref 70–99)
GLUCOSE URINE: NEGATIVE
HCT VFR BLD CALC: 41.4 % (ref 36.3–47.1)
HEMOGLOBIN: 13 G/DL (ref 11.9–15.1)
INR BLD: 1
KETONES, URINE: ABNORMAL
LEUKOCYTE ESTERASE, URINE: NEGATIVE
Lab: NORMAL
MRSA, DNA, NASAL: NORMAL
NITRITE, URINE: NEGATIVE
PARTIAL THROMBOPLASTIN TIME: 22.7 SEC (ref 21.3–31.3)
PH UA: 5 (ref 5–8)
POTASSIUM SERPL-SCNC: 4.5 MMOL/L (ref 3.7–5.3)
PROTEIN UA: NEGATIVE
PROTHROMBIN TIME: 10.4 SEC (ref 9.4–12.6)
SODIUM BLD-SCNC: 140 MMOL/L (ref 135–144)
SPECIFIC GRAVITY UA: 1.03 (ref 1–1.03)
SPECIMEN DESCRIPTION: NORMAL
SPECIMEN DESCRIPTION: NORMAL
TURBIDITY: CLEAR
URINE HGB: NEGATIVE
UROBILINOGEN, URINE: NORMAL

## 2019-06-17 PROCEDURE — 85014 HEMATOCRIT: CPT

## 2019-06-17 PROCEDURE — 81003 URINALYSIS AUTO W/O SCOPE: CPT

## 2019-06-17 PROCEDURE — 85730 THROMBOPLASTIN TIME PARTIAL: CPT

## 2019-06-17 PROCEDURE — 93005 ELECTROCARDIOGRAM TRACING: CPT

## 2019-06-17 PROCEDURE — 80048 BASIC METABOLIC PNL TOTAL CA: CPT

## 2019-06-17 PROCEDURE — 85018 HEMOGLOBIN: CPT

## 2019-06-17 PROCEDURE — 36415 COLL VENOUS BLD VENIPUNCTURE: CPT

## 2019-06-17 PROCEDURE — 85610 PROTHROMBIN TIME: CPT

## 2019-06-17 PROCEDURE — 87641 MR-STAPH DNA AMP PROBE: CPT

## 2019-06-17 RX ORDER — OXYCODONE HYDROCHLORIDE AND ACETAMINOPHEN 5; 325 MG/1; MG/1
1-2 TABLET ORAL EVERY 4 HOURS PRN
Qty: 40 TABLET | Refills: 0 | Status: SHIPPED | OUTPATIENT
Start: 2019-06-17 | End: 2019-07-27

## 2019-06-17 NOTE — PROGRESS NOTES
Pt provided 2 bottles of CHG soap w written and verbal instructions on use for preop procedure wash night before and morning of procedure. Pt verbalized understanding. Pt encouraged to avoid excessive hot tub use on soon scheduled vacation due to bacteria risk. Pt agrees.

## 2019-06-24 ENCOUNTER — ANESTHESIA EVENT (OUTPATIENT)
Dept: OPERATING ROOM | Age: 49
DRG: 908 | End: 2019-06-24
Payer: COMMERCIAL

## 2019-06-28 RX ORDER — SODIUM CHLORIDE 0.9 % (FLUSH) 0.9 %
10 SYRINGE (ML) INJECTION PRN
Status: CANCELLED | OUTPATIENT
Start: 2019-06-28

## 2019-06-28 RX ORDER — DOCUSATE SODIUM 100 MG/1
100 CAPSULE, LIQUID FILLED ORAL 2 TIMES DAILY
Status: CANCELLED | OUTPATIENT
Start: 2019-06-28

## 2019-06-28 RX ORDER — ONDANSETRON 2 MG/ML
4 INJECTION INTRAMUSCULAR; INTRAVENOUS EVERY 6 HOURS PRN
Status: CANCELLED | OUTPATIENT
Start: 2019-06-28

## 2019-06-28 RX ORDER — SODIUM CHLORIDE 0.9 % (FLUSH) 0.9 %
10 SYRINGE (ML) INJECTION EVERY 12 HOURS SCHEDULED
Status: CANCELLED | OUTPATIENT
Start: 2019-06-28

## 2019-06-28 RX ORDER — SODIUM CHLORIDE, SODIUM LACTATE, POTASSIUM CHLORIDE, CALCIUM CHLORIDE 600; 310; 30; 20 MG/100ML; MG/100ML; MG/100ML; MG/100ML
INJECTION, SOLUTION INTRAVENOUS CONTINUOUS
Status: CANCELLED | OUTPATIENT
Start: 2019-06-28

## 2019-07-01 ENCOUNTER — HOSPITAL ENCOUNTER (INPATIENT)
Age: 49
LOS: 1 days | Discharge: HOME | DRG: 908 | End: 2019-07-02
Attending: FAMILY MEDICINE | Admitting: FAMILY MEDICINE
Payer: COMMERCIAL

## 2019-07-01 ENCOUNTER — APPOINTMENT (OUTPATIENT)
Dept: GENERAL RADIOLOGY | Age: 49
DRG: 908 | End: 2019-07-01
Payer: COMMERCIAL

## 2019-07-01 ENCOUNTER — APPOINTMENT (OUTPATIENT)
Dept: GENERAL RADIOLOGY | Age: 49
DRG: 908 | End: 2019-07-01
Attending: ORTHOPAEDIC SURGERY
Payer: COMMERCIAL

## 2019-07-01 ENCOUNTER — HOSPITAL ENCOUNTER (OUTPATIENT)
Age: 49
Setting detail: OUTPATIENT SURGERY
Discharge: STILL ADMITTED OR ADMINISTRATIVE | DRG: 908 | End: 2019-07-01
Attending: ORTHOPAEDIC SURGERY | Admitting: ORTHOPAEDIC SURGERY
Payer: COMMERCIAL

## 2019-07-01 ENCOUNTER — APPOINTMENT (OUTPATIENT)
Dept: CT IMAGING | Age: 49
DRG: 908 | End: 2019-07-01
Payer: COMMERCIAL

## 2019-07-01 ENCOUNTER — ANESTHESIA (OUTPATIENT)
Dept: OPERATING ROOM | Age: 49
DRG: 908 | End: 2019-07-01
Payer: COMMERCIAL

## 2019-07-01 VITALS — SYSTOLIC BLOOD PRESSURE: 149 MMHG | DIASTOLIC BLOOD PRESSURE: 82 MMHG | TEMPERATURE: 100.2 F | OXYGEN SATURATION: 97 %

## 2019-07-01 VITALS
HEART RATE: 98 BPM | OXYGEN SATURATION: 98 % | SYSTOLIC BLOOD PRESSURE: 164 MMHG | DIASTOLIC BLOOD PRESSURE: 82 MMHG | WEIGHT: 227.29 LBS | TEMPERATURE: 98 F | RESPIRATION RATE: 16 BRPM | HEIGHT: 66 IN | BODY MASS INDEX: 36.53 KG/M2

## 2019-07-01 DIAGNOSIS — M17.12 PRIMARY OSTEOARTHRITIS OF LEFT KNEE: Primary | ICD-10-CM

## 2019-07-01 DIAGNOSIS — Z96.652 S/P TOTAL KNEE ARTHROPLASTY, LEFT: ICD-10-CM

## 2019-07-01 DIAGNOSIS — R41.82 ALTERED MENTAL STATUS, UNSPECIFIED ALTERED MENTAL STATUS TYPE: ICD-10-CM

## 2019-07-01 PROBLEM — J45.20 MILD INTERMITTENT ASTHMA: Status: ACTIVE | Noted: 2019-07-01

## 2019-07-01 PROBLEM — K76.0 HEPATIC STEATOSIS: Status: ACTIVE | Noted: 2017-12-18

## 2019-07-01 PROBLEM — Z86.14 HISTORY OF METHICILLIN RESISTANT STAPHYLOCOCCUS AUREUS INFECTION: Status: ACTIVE | Noted: 2019-07-01

## 2019-07-01 PROBLEM — G89.29 CHRONIC PAIN: Status: ACTIVE | Noted: 2019-07-01

## 2019-07-01 PROBLEM — T88.59XA DELAYED EMERGENCE FROM GENERAL ANESTHESIA: Status: ACTIVE | Noted: 2019-07-01

## 2019-07-01 PROBLEM — G47.30 SLEEP APNEA: Status: ACTIVE | Noted: 2019-07-01

## 2019-07-01 PROBLEM — M79.7 FIBROMYALGIA: Status: ACTIVE | Noted: 2019-07-01

## 2019-07-01 PROBLEM — I10 ESSENTIAL HYPERTENSION: Status: ACTIVE | Noted: 2019-07-01

## 2019-07-01 LAB
ABSOLUTE EOS #: 0.1 K/UL (ref 0–0.4)
ABSOLUTE IMMATURE GRANULOCYTE: ABNORMAL K/UL (ref 0–0.3)
ABSOLUTE LYMPH #: 0.7 K/UL (ref 1–4.8)
ABSOLUTE MONO #: 0.2 K/UL (ref 0.1–1.2)
ALBUMIN SERPL-MCNC: 3.8 G/DL (ref 3.5–5.2)
ALBUMIN/GLOBULIN RATIO: 1.2 (ref 1–2.5)
ALP BLD-CCNC: 99 U/L (ref 35–104)
ALT SERPL-CCNC: 18 U/L (ref 5–33)
ANION GAP SERPL CALCULATED.3IONS-SCNC: 13 MMOL/L (ref 9–17)
AST SERPL-CCNC: 27 U/L
BASOPHILS # BLD: 0 % (ref 0–2)
BASOPHILS ABSOLUTE: 0 K/UL (ref 0–0.2)
BILIRUB SERPL-MCNC: 0.25 MG/DL (ref 0.3–1.2)
BILIRUBIN DIRECT: 0.09 MG/DL
BILIRUBIN URINE: NEGATIVE
BILIRUBIN, INDIRECT: 0.16 MG/DL (ref 0–1)
BUN BLDV-MCNC: 12 MG/DL (ref 6–20)
BUN/CREAT BLD: ABNORMAL (ref 9–20)
CALCIUM SERPL-MCNC: 9.3 MG/DL (ref 8.6–10.4)
CHLORIDE BLD-SCNC: 103 MMOL/L (ref 98–107)
CO2: 23 MMOL/L (ref 20–31)
COLOR: YELLOW
COMMENT UA: NORMAL
CREAT SERPL-MCNC: 0.65 MG/DL (ref 0.5–0.9)
DIFFERENTIAL TYPE: ABNORMAL
EOSINOPHILS RELATIVE PERCENT: 1 % (ref 1–4)
GFR AFRICAN AMERICAN: >60 ML/MIN
GFR NON-AFRICAN AMERICAN: >60 ML/MIN
GFR SERPL CREATININE-BSD FRML MDRD: ABNORMAL ML/MIN/{1.73_M2}
GFR SERPL CREATININE-BSD FRML MDRD: ABNORMAL ML/MIN/{1.73_M2}
GLOBULIN: ABNORMAL G/DL (ref 1.5–3.8)
GLUCOSE BLD-MCNC: 145 MG/DL (ref 70–99)
GLUCOSE BLD-MCNC: 150 MG/DL (ref 65–105)
GLUCOSE URINE: NEGATIVE
HCT VFR BLD CALC: 37.2 % (ref 36–46)
HEMOGLOBIN: 12.7 G/DL (ref 12–16)
IMMATURE GRANULOCYTES: ABNORMAL %
INR BLD: 1
KETONES, URINE: NEGATIVE
LEUKOCYTE ESTERASE, URINE: NEGATIVE
LYMPHOCYTES # BLD: 6 % (ref 24–44)
MCH RBC QN AUTO: 30.2 PG (ref 26–34)
MCHC RBC AUTO-ENTMCNC: 34.2 G/DL (ref 31–37)
MCV RBC AUTO: 88.2 FL (ref 80–100)
MONOCYTES # BLD: 2 % (ref 2–11)
NITRITE, URINE: NEGATIVE
NRBC AUTOMATED: ABNORMAL PER 100 WBC
PARTIAL THROMBOPLASTIN TIME: 22.9 SEC (ref 21.3–31.3)
PDW BLD-RTO: 13.7 % (ref 12.5–15.4)
PH UA: 5.5
PLATELET # BLD: 257 K/UL (ref 140–450)
PLATELET ESTIMATE: ABNORMAL
PMV BLD AUTO: 6.9 FL (ref 6–12)
POTASSIUM SERPL-SCNC: 4 MMOL/L (ref 3.7–5.3)
PROTEIN UA: NEGATIVE
PROTHROMBIN TIME: 10 SEC (ref 9.4–12.6)
RBC # BLD: 4.22 M/UL (ref 4–5.2)
RBC # BLD: ABNORMAL 10*6/UL
SEG NEUTROPHILS: 91 % (ref 36–66)
SEGMENTED NEUTROPHILS ABSOLUTE COUNT: 11.4 K/UL (ref 1.8–7.7)
SODIUM BLD-SCNC: 139 MMOL/L (ref 135–144)
SPECIFIC GRAVITY UA: 1
TOTAL PROTEIN: 7 G/DL (ref 6.4–8.3)
TROPONIN INTERP: NORMAL
TROPONIN T: NORMAL NG/ML
TROPONIN, HIGH SENSITIVITY: 9 NG/L (ref 0–14)
TURBIDITY: CLEAR
URINE HGB: NEGATIVE
UROBILINOGEN, URINE: NORMAL
WBC # BLD: 12.4 K/UL (ref 3.5–11)
WBC # BLD: ABNORMAL 10*3/UL

## 2019-07-01 PROCEDURE — 0SRD0J9 REPLACEMENT OF LEFT KNEE JOINT WITH SYNTHETIC SUBSTITUTE, CEMENTED, OPEN APPROACH: ICD-10-PCS | Performed by: ORTHOPAEDIC SURGERY

## 2019-07-01 PROCEDURE — 80076 HEPATIC FUNCTION PANEL: CPT

## 2019-07-01 PROCEDURE — 80048 BASIC METABOLIC PNL TOTAL CA: CPT

## 2019-07-01 PROCEDURE — 2580000003 HC RX 258: Performed by: CLINICAL NURSE SPECIALIST

## 2019-07-01 PROCEDURE — 97110 THERAPEUTIC EXERCISES: CPT

## 2019-07-01 PROCEDURE — 6370000000 HC RX 637 (ALT 250 FOR IP): Performed by: ORTHOPAEDIC SURGERY

## 2019-07-01 PROCEDURE — 84484 ASSAY OF TROPONIN QUANT: CPT

## 2019-07-01 PROCEDURE — 99222 1ST HOSP IP/OBS MODERATE 55: CPT | Performed by: CLINICAL NURSE SPECIALIST

## 2019-07-01 PROCEDURE — 85025 COMPLETE CBC W/AUTO DIFF WBC: CPT

## 2019-07-01 PROCEDURE — 82947 ASSAY GLUCOSE BLOOD QUANT: CPT

## 2019-07-01 PROCEDURE — 97162 PT EVAL MOD COMPLEX 30 MIN: CPT

## 2019-07-01 PROCEDURE — 3E0T3BZ INTRODUCTION OF ANESTHETIC AGENT INTO PERIPHERAL NERVES AND PLEXI, PERCUTANEOUS APPROACH: ICD-10-PCS | Performed by: ORTHOPAEDIC SURGERY

## 2019-07-01 PROCEDURE — 2709999900 HC NON-CHARGEABLE SUPPLY: Performed by: ORTHOPAEDIC SURGERY

## 2019-07-01 PROCEDURE — 99222 1ST HOSP IP/OBS MODERATE 55: CPT | Performed by: PSYCHIATRY & NEUROLOGY

## 2019-07-01 PROCEDURE — 85610 PROTHROMBIN TIME: CPT

## 2019-07-01 PROCEDURE — C1776 JOINT DEVICE (IMPLANTABLE): HCPCS | Performed by: ORTHOPAEDIC SURGERY

## 2019-07-01 PROCEDURE — 6370000000 HC RX 637 (ALT 250 FOR IP): Performed by: CLINICAL NURSE SPECIALIST

## 2019-07-01 PROCEDURE — 1200000000 HC SEMI PRIVATE

## 2019-07-01 PROCEDURE — 94760 N-INVAS EAR/PLS OXIMETRY 1: CPT

## 2019-07-01 PROCEDURE — 6360000002 HC RX W HCPCS: Performed by: ANESTHESIOLOGY

## 2019-07-01 PROCEDURE — 97116 GAIT TRAINING THERAPY: CPT

## 2019-07-01 PROCEDURE — 70496 CT ANGIOGRAPHY HEAD: CPT

## 2019-07-01 PROCEDURE — 27447 TOTAL KNEE ARTHROPLASTY: CPT | Performed by: ORTHOPAEDIC SURGERY

## 2019-07-01 PROCEDURE — 7100000000 HC PACU RECOVERY - FIRST 15 MIN: Performed by: ORTHOPAEDIC SURGERY

## 2019-07-01 PROCEDURE — 81003 URINALYSIS AUTO W/O SCOPE: CPT

## 2019-07-01 PROCEDURE — 2500000003 HC RX 250 WO HCPCS: Performed by: ANESTHESIOLOGY

## 2019-07-01 PROCEDURE — 6360000002 HC RX W HCPCS: Performed by: ORTHOPAEDIC SURGERY

## 2019-07-01 PROCEDURE — 2580000003 HC RX 258: Performed by: SPECIALIST

## 2019-07-01 PROCEDURE — 3600000014 HC SURGERY LEVEL 4 ADDTL 15MIN: Performed by: ORTHOPAEDIC SURGERY

## 2019-07-01 PROCEDURE — 97166 OT EVAL MOD COMPLEX 45 MIN: CPT

## 2019-07-01 PROCEDURE — 7100000001 HC PACU RECOVERY - ADDTL 15 MIN: Performed by: ORTHOPAEDIC SURGERY

## 2019-07-01 PROCEDURE — 97535 SELF CARE MNGMENT TRAINING: CPT

## 2019-07-01 PROCEDURE — 76942 ECHO GUIDE FOR BIOPSY: CPT | Performed by: ANESTHESIOLOGY

## 2019-07-01 PROCEDURE — 71045 X-RAY EXAM CHEST 1 VIEW: CPT

## 2019-07-01 PROCEDURE — 6370000000 HC RX 637 (ALT 250 FOR IP)

## 2019-07-01 PROCEDURE — C1713 ANCHOR/SCREW BN/BN,TIS/BN: HCPCS | Performed by: ORTHOPAEDIC SURGERY

## 2019-07-01 PROCEDURE — 93005 ELECTROCARDIOGRAM TRACING: CPT

## 2019-07-01 PROCEDURE — 3700000000 HC ANESTHESIA ATTENDED CARE: Performed by: ORTHOPAEDIC SURGERY

## 2019-07-01 PROCEDURE — 70450 CT HEAD/BRAIN W/O DYE: CPT

## 2019-07-01 PROCEDURE — 6360000004 HC RX CONTRAST MEDICATION: Performed by: SPECIALIST

## 2019-07-01 PROCEDURE — 2580000003 HC RX 258: Performed by: ANESTHESIOLOGY

## 2019-07-01 PROCEDURE — 85730 THROMBOPLASTIN TIME PARTIAL: CPT

## 2019-07-01 PROCEDURE — 73560 X-RAY EXAM OF KNEE 1 OR 2: CPT

## 2019-07-01 PROCEDURE — 36415 COLL VENOUS BLD VENIPUNCTURE: CPT

## 2019-07-01 PROCEDURE — 3600000004 HC SURGERY LEVEL 4 BASE: Performed by: ORTHOPAEDIC SURGERY

## 2019-07-01 PROCEDURE — 99285 EMERGENCY DEPT VISIT HI MDM: CPT

## 2019-07-01 PROCEDURE — 3700000001 HC ADD 15 MINUTES (ANESTHESIA): Performed by: ORTHOPAEDIC SURGERY

## 2019-07-01 PROCEDURE — 2500000003 HC RX 250 WO HCPCS: Performed by: ORTHOPAEDIC SURGERY

## 2019-07-01 PROCEDURE — 2720000010 HC SURG SUPPLY STERILE: Performed by: ORTHOPAEDIC SURGERY

## 2019-07-01 DEVICE — IMPLANTABLE DEVICE: Type: IMPLANTABLE DEVICE | Site: KNEE | Status: FUNCTIONAL

## 2019-07-01 DEVICE — COMPONENT PAT DIA34MM THK8.5MM STD KNEE TI ALLY S STL: Type: IMPLANTABLE DEVICE | Site: KNEE | Status: FUNCTIONAL

## 2019-07-01 DEVICE — CEMENT BNE 40GM HI VISC RADPQ FOR REV SURG: Type: IMPLANTABLE DEVICE | Site: KNEE | Status: FUNCTIONAL

## 2019-07-01 DEVICE — TRAY TIB L67MM KNEE CO CHROM FIN MOD INTLOK VANGUARD: Type: IMPLANTABLE DEVICE | Site: KNEE | Status: FUNCTIONAL

## 2019-07-01 RX ORDER — FENTANYL CITRATE 50 UG/ML
25 INJECTION, SOLUTION INTRAMUSCULAR; INTRAVENOUS EVERY 5 MIN PRN
Status: DISCONTINUED | OUTPATIENT
Start: 2019-07-01 | End: 2019-07-01 | Stop reason: HOSPADM

## 2019-07-01 RX ORDER — POTASSIUM CHLORIDE 7.45 MG/ML
10 INJECTION INTRAVENOUS PRN
Status: DISCONTINUED | OUTPATIENT
Start: 2019-07-01 | End: 2019-07-02 | Stop reason: HOSPADM

## 2019-07-01 RX ORDER — ROPINIROLE 0.25 MG/1
0.25 TABLET, FILM COATED ORAL NIGHTLY
Status: DISCONTINUED | OUTPATIENT
Start: 2019-07-01 | End: 2019-07-02 | Stop reason: HOSPADM

## 2019-07-01 RX ORDER — CALCIUM CHLORIDE 100 MG/ML
INJECTION INTRAVENOUS; INTRAVENTRICULAR
Status: DISCONTINUED
Start: 2019-07-01 | End: 2019-07-01 | Stop reason: HOSPADM

## 2019-07-01 RX ORDER — SIMVASTATIN 10 MG
20 TABLET ORAL NIGHTLY
Status: DISCONTINUED | OUTPATIENT
Start: 2019-07-01 | End: 2019-07-02 | Stop reason: HOSPADM

## 2019-07-01 RX ORDER — LANOLIN ALCOHOL/MO/W.PET/CERES
3 CREAM (GRAM) TOPICAL NIGHTLY PRN
COMMUNITY
End: 2020-02-26

## 2019-07-01 RX ORDER — GABAPENTIN 400 MG/1
800 CAPSULE ORAL ONCE
Status: DISCONTINUED | OUTPATIENT
Start: 2019-07-01 | End: 2019-07-01 | Stop reason: HOSPADM

## 2019-07-01 RX ORDER — SODIUM CHLORIDE 0.9 % (FLUSH) 0.9 %
10 SYRINGE (ML) INJECTION PRN
Status: DISCONTINUED | OUTPATIENT
Start: 2019-07-01 | End: 2019-07-02 | Stop reason: HOSPADM

## 2019-07-01 RX ORDER — SCOLOPAMINE TRANSDERMAL SYSTEM 1 MG/1
1 PATCH, EXTENDED RELEASE TRANSDERMAL ONCE
Status: DISCONTINUED | OUTPATIENT
Start: 2019-07-01 | End: 2019-07-01 | Stop reason: HOSPADM

## 2019-07-01 RX ORDER — DULOXETIN HYDROCHLORIDE 30 MG/1
60 CAPSULE, DELAYED RELEASE ORAL DAILY
Status: DISCONTINUED | OUTPATIENT
Start: 2019-07-01 | End: 2019-07-02 | Stop reason: HOSPADM

## 2019-07-01 RX ORDER — SODIUM CHLORIDE, SODIUM LACTATE, POTASSIUM CHLORIDE, CALCIUM CHLORIDE 600; 310; 30; 20 MG/100ML; MG/100ML; MG/100ML; MG/100ML
INJECTION, SOLUTION INTRAVENOUS CONTINUOUS
Status: DISCONTINUED | OUTPATIENT
Start: 2019-07-01 | End: 2019-07-01 | Stop reason: HOSPADM

## 2019-07-01 RX ORDER — BISACODYL 10 MG
10 SUPPOSITORY, RECTAL RECTAL DAILY PRN
Status: DISCONTINUED | OUTPATIENT
Start: 2019-07-01 | End: 2019-07-02 | Stop reason: HOSPADM

## 2019-07-01 RX ORDER — KETOROLAC TROMETHAMINE 15 MG/ML
15 INJECTION, SOLUTION INTRAMUSCULAR; INTRAVENOUS EVERY 8 HOURS SCHEDULED
Status: DISCONTINUED | OUTPATIENT
Start: 2019-07-01 | End: 2019-07-02 | Stop reason: HOSPADM

## 2019-07-01 RX ORDER — DEXAMETHASONE SODIUM PHOSPHATE 10 MG/ML
10 INJECTION, SOLUTION INTRAMUSCULAR; INTRAVENOUS ONCE
Status: DISCONTINUED | OUTPATIENT
Start: 2019-07-01 | End: 2019-07-01 | Stop reason: HOSPADM

## 2019-07-01 RX ORDER — KETOROLAC TROMETHAMINE 30 MG/ML
INJECTION, SOLUTION INTRAMUSCULAR; INTRAVENOUS
Status: DISCONTINUED
Start: 2019-07-01 | End: 2019-07-01 | Stop reason: HOSPADM

## 2019-07-01 RX ORDER — FLUDROCORTISONE ACETATE 0.1 MG/1
0.1 TABLET ORAL DAILY
Status: DISCONTINUED | OUTPATIENT
Start: 2019-07-01 | End: 2019-07-02 | Stop reason: HOSPADM

## 2019-07-01 RX ORDER — 0.9 % SODIUM CHLORIDE 0.9 %
80 INTRAVENOUS SOLUTION INTRAVENOUS ONCE
Status: COMPLETED | OUTPATIENT
Start: 2019-07-01 | End: 2019-07-01

## 2019-07-01 RX ORDER — ACETAMINOPHEN 325 MG/1
650 TABLET ORAL EVERY 4 HOURS PRN
Status: DISCONTINUED | OUTPATIENT
Start: 2019-07-01 | End: 2019-07-02 | Stop reason: HOSPADM

## 2019-07-01 RX ORDER — SODIUM CHLORIDE 9 MG/ML
INJECTION, SOLUTION INTRAVENOUS CONTINUOUS
Status: DISCONTINUED | OUTPATIENT
Start: 2019-07-01 | End: 2019-07-01 | Stop reason: HOSPADM

## 2019-07-01 RX ORDER — ONDANSETRON 2 MG/ML
4 INJECTION INTRAMUSCULAR; INTRAVENOUS
Status: DISCONTINUED | OUTPATIENT
Start: 2019-07-01 | End: 2019-07-01 | Stop reason: HOSPADM

## 2019-07-01 RX ORDER — MORPHINE SULFATE 1 MG/ML
INJECTION, SOLUTION EPIDURAL; INTRATHECAL; INTRAVENOUS
Status: DISCONTINUED
Start: 2019-07-01 | End: 2019-07-01 | Stop reason: HOSPADM

## 2019-07-01 RX ORDER — MAGNESIUM SULFATE 1 G/100ML
1 INJECTION INTRAVENOUS PRN
Status: DISCONTINUED | OUTPATIENT
Start: 2019-07-01 | End: 2019-07-02 | Stop reason: HOSPADM

## 2019-07-01 RX ORDER — ACETAMINOPHEN 500 MG
1000 TABLET ORAL ONCE
Status: COMPLETED | OUTPATIENT
Start: 2019-07-01 | End: 2019-07-01

## 2019-07-01 RX ORDER — OXYCODONE HYDROCHLORIDE AND ACETAMINOPHEN 5; 325 MG/1; MG/1
1 TABLET ORAL EVERY 4 HOURS PRN
Status: DISCONTINUED | OUTPATIENT
Start: 2019-07-01 | End: 2019-07-02 | Stop reason: HOSPADM

## 2019-07-01 RX ORDER — FENTANYL CITRATE 50 UG/ML
INJECTION, SOLUTION INTRAMUSCULAR; INTRAVENOUS PRN
Status: DISCONTINUED | OUTPATIENT
Start: 2019-07-01 | End: 2019-07-01 | Stop reason: SDUPTHER

## 2019-07-01 RX ORDER — VANCOMYCIN HYDROCHLORIDE 500 MG/10ML
INJECTION, POWDER, LYOPHILIZED, FOR SOLUTION INTRAVENOUS
Status: DISCONTINUED
Start: 2019-07-01 | End: 2019-07-01 | Stop reason: WASHOUT

## 2019-07-01 RX ORDER — ASPIRIN 81 MG/1
81 TABLET, CHEWABLE ORAL DAILY
Status: DISCONTINUED | OUTPATIENT
Start: 2019-07-01 | End: 2019-07-01

## 2019-07-01 RX ORDER — PROMETHAZINE HYDROCHLORIDE 25 MG/ML
6.25 INJECTION, SOLUTION INTRAMUSCULAR; INTRAVENOUS
Status: DISCONTINUED | OUTPATIENT
Start: 2019-07-01 | End: 2019-07-01 | Stop reason: HOSPADM

## 2019-07-01 RX ORDER — SODIUM CHLORIDE 0.9 % (FLUSH) 0.9 %
10 SYRINGE (ML) INJECTION PRN
Status: DISCONTINUED | OUTPATIENT
Start: 2019-07-01 | End: 2019-07-01 | Stop reason: HOSPADM

## 2019-07-01 RX ORDER — GLYCOPYRROLATE 1 MG/5 ML
SYRINGE (ML) INTRAVENOUS PRN
Status: DISCONTINUED | OUTPATIENT
Start: 2019-07-01 | End: 2019-07-01 | Stop reason: SDUPTHER

## 2019-07-01 RX ORDER — ROPINIROLE 0.25 MG/1
TABLET, FILM COATED ORAL NIGHTLY
COMMUNITY

## 2019-07-01 RX ORDER — DEXAMETHASONE SODIUM PHOSPHATE 10 MG/ML
INJECTION, SOLUTION INTRAMUSCULAR; INTRAVENOUS PRN
Status: DISCONTINUED | OUTPATIENT
Start: 2019-07-01 | End: 2019-07-01 | Stop reason: SDUPTHER

## 2019-07-01 RX ORDER — CLONAZEPAM 0.5 MG/1
0.5 TABLET ORAL 2 TIMES DAILY
Status: DISCONTINUED | OUTPATIENT
Start: 2019-07-01 | End: 2019-07-02 | Stop reason: HOSPADM

## 2019-07-01 RX ORDER — ONDANSETRON 2 MG/ML
INJECTION INTRAMUSCULAR; INTRAVENOUS PRN
Status: DISCONTINUED | OUTPATIENT
Start: 2019-07-01 | End: 2019-07-01 | Stop reason: SDUPTHER

## 2019-07-01 RX ORDER — METOPROLOL TARTRATE 50 MG/1
100 TABLET, FILM COATED ORAL DAILY
Status: DISCONTINUED | OUTPATIENT
Start: 2019-07-01 | End: 2019-07-02 | Stop reason: HOSPADM

## 2019-07-01 RX ORDER — PROPOFOL 10 MG/ML
INJECTION, EMULSION INTRAVENOUS PRN
Status: DISCONTINUED | OUTPATIENT
Start: 2019-07-01 | End: 2019-07-01 | Stop reason: SDUPTHER

## 2019-07-01 RX ORDER — CALCIUM CHLORIDE 100 MG/ML
INJECTION INTRAVENOUS; INTRAVENTRICULAR PRN
Status: DISCONTINUED | OUTPATIENT
Start: 2019-07-01 | End: 2019-07-01 | Stop reason: ALTCHOICE

## 2019-07-01 RX ORDER — VANCOMYCIN HYDROCHLORIDE 1 G/20ML
INJECTION, POWDER, LYOPHILIZED, FOR SOLUTION INTRAVENOUS PRN
Status: DISCONTINUED | OUTPATIENT
Start: 2019-07-01 | End: 2019-07-01 | Stop reason: SDUPTHER

## 2019-07-01 RX ORDER — OXYCODONE HYDROCHLORIDE AND ACETAMINOPHEN 5; 325 MG/1; MG/1
2 TABLET ORAL EVERY 4 HOURS PRN
Status: DISCONTINUED | OUTPATIENT
Start: 2019-07-01 | End: 2019-07-02 | Stop reason: HOSPADM

## 2019-07-01 RX ORDER — SODIUM CHLORIDE, SODIUM LACTATE, POTASSIUM CHLORIDE, CALCIUM CHLORIDE 600; 310; 30; 20 MG/100ML; MG/100ML; MG/100ML; MG/100ML
INJECTION, SOLUTION INTRAVENOUS CONTINUOUS
Status: DISCONTINUED | OUTPATIENT
Start: 2019-07-01 | End: 2019-07-02 | Stop reason: HOSPADM

## 2019-07-01 RX ORDER — MIDAZOLAM HYDROCHLORIDE 1 MG/ML
2 INJECTION INTRAMUSCULAR; INTRAVENOUS
Status: DISCONTINUED | OUTPATIENT
Start: 2019-07-01 | End: 2019-07-01 | Stop reason: HOSPADM

## 2019-07-01 RX ORDER — DIPHENHYDRAMINE HYDROCHLORIDE 50 MG/ML
12.5 INJECTION INTRAMUSCULAR; INTRAVENOUS
Status: DISCONTINUED | OUTPATIENT
Start: 2019-07-01 | End: 2019-07-01 | Stop reason: HOSPADM

## 2019-07-01 RX ORDER — DOCUSATE SODIUM 100 MG/1
100 CAPSULE, LIQUID FILLED ORAL 2 TIMES DAILY
Status: DISCONTINUED | OUTPATIENT
Start: 2019-07-01 | End: 2019-07-02 | Stop reason: HOSPADM

## 2019-07-01 RX ORDER — MEPERIDINE HYDROCHLORIDE 50 MG/ML
12.5 INJECTION INTRAMUSCULAR; INTRAVENOUS; SUBCUTANEOUS EVERY 5 MIN PRN
Status: DISCONTINUED | OUTPATIENT
Start: 2019-07-01 | End: 2019-07-01 | Stop reason: HOSPADM

## 2019-07-01 RX ORDER — SODIUM CHLORIDE, SODIUM LACTATE, POTASSIUM CHLORIDE, CALCIUM CHLORIDE 600; 310; 30; 20 MG/100ML; MG/100ML; MG/100ML; MG/100ML
INJECTION, SOLUTION INTRAVENOUS CONTINUOUS PRN
Status: DISCONTINUED | OUTPATIENT
Start: 2019-07-01 | End: 2019-07-01 | Stop reason: SDUPTHER

## 2019-07-01 RX ORDER — GABAPENTIN 400 MG/1
CAPSULE ORAL
Status: COMPLETED
Start: 2019-07-01 | End: 2019-07-01

## 2019-07-01 RX ORDER — PROPOFOL 10 MG/ML
INJECTION, EMULSION INTRAVENOUS
Status: DISCONTINUED
Start: 2019-07-01 | End: 2019-07-01 | Stop reason: HOSPADM

## 2019-07-01 RX ORDER — MIDAZOLAM HYDROCHLORIDE 1 MG/ML
INJECTION INTRAMUSCULAR; INTRAVENOUS PRN
Status: DISCONTINUED | OUTPATIENT
Start: 2019-07-01 | End: 2019-07-01 | Stop reason: SDUPTHER

## 2019-07-01 RX ORDER — SODIUM CHLORIDE 0.9 % (FLUSH) 0.9 %
10 SYRINGE (ML) INJECTION EVERY 12 HOURS SCHEDULED
Status: DISCONTINUED | OUTPATIENT
Start: 2019-07-01 | End: 2019-07-01 | Stop reason: HOSPADM

## 2019-07-01 RX ORDER — NALOXONE HYDROCHLORIDE 0.4 MG/ML
INJECTION, SOLUTION INTRAMUSCULAR; INTRAVENOUS; SUBCUTANEOUS
Status: DISCONTINUED
Start: 2019-07-01 | End: 2019-07-01 | Stop reason: HOSPADM

## 2019-07-01 RX ORDER — BUPIVACAINE HYDROCHLORIDE 2.5 MG/ML
INJECTION, SOLUTION EPIDURAL; INFILTRATION; INTRACAUDAL
Status: DISCONTINUED
Start: 2019-07-01 | End: 2019-07-01 | Stop reason: HOSPADM

## 2019-07-01 RX ORDER — ACETAMINOPHEN 500 MG
TABLET ORAL
Status: COMPLETED
Start: 2019-07-01 | End: 2019-07-01

## 2019-07-01 RX ORDER — ACETAMINOPHEN 500 MG
1000 TABLET ORAL EVERY 8 HOURS SCHEDULED
Status: DISCONTINUED | OUTPATIENT
Start: 2019-07-01 | End: 2019-07-02 | Stop reason: HOSPADM

## 2019-07-01 RX ORDER — ONDANSETRON 2 MG/ML
4 INJECTION INTRAMUSCULAR; INTRAVENOUS EVERY 6 HOURS PRN
Status: DISCONTINUED | OUTPATIENT
Start: 2019-07-01 | End: 2019-07-02 | Stop reason: HOSPADM

## 2019-07-01 RX ORDER — ASPIRIN 81 MG/1
81 TABLET, CHEWABLE ORAL 2 TIMES DAILY
Status: DISCONTINUED | OUTPATIENT
Start: 2019-07-02 | End: 2019-07-02 | Stop reason: HOSPADM

## 2019-07-01 RX ORDER — ROPIVACAINE HYDROCHLORIDE 2 MG/ML
INJECTION, SOLUTION EPIDURAL; INFILTRATION; PERINEURAL
Status: DISCONTINUED
Start: 2019-07-01 | End: 2019-07-01 | Stop reason: HOSPADM

## 2019-07-01 RX ORDER — VANCOMYCIN HYDROCHLORIDE 1 G/20ML
INJECTION, POWDER, LYOPHILIZED, FOR SOLUTION INTRAVENOUS
Status: DISCONTINUED
Start: 2019-07-01 | End: 2019-07-01 | Stop reason: HOSPADM

## 2019-07-01 RX ORDER — ALBUTEROL SULFATE 90 UG/1
2 AEROSOL, METERED RESPIRATORY (INHALATION) EVERY 6 HOURS PRN
Status: DISCONTINUED | OUTPATIENT
Start: 2019-07-01 | End: 2019-07-02 | Stop reason: HOSPADM

## 2019-07-01 RX ORDER — LISINOPRIL 5 MG/1
2.5 TABLET ORAL DAILY
Status: DISCONTINUED | OUTPATIENT
Start: 2019-07-01 | End: 2019-07-02 | Stop reason: HOSPADM

## 2019-07-01 RX ORDER — PANTOPRAZOLE SODIUM 40 MG/1
40 TABLET, DELAYED RELEASE ORAL DAILY
Status: DISCONTINUED | OUTPATIENT
Start: 2019-07-01 | End: 2019-07-02 | Stop reason: HOSPADM

## 2019-07-01 RX ORDER — SODIUM CHLORIDE 0.9 % (FLUSH) 0.9 %
10 SYRINGE (ML) INJECTION EVERY 12 HOURS SCHEDULED
Status: DISCONTINUED | OUTPATIENT
Start: 2019-07-01 | End: 2019-07-02 | Stop reason: HOSPADM

## 2019-07-01 RX ORDER — HYDRALAZINE HYDROCHLORIDE 20 MG/ML
5 INJECTION INTRAMUSCULAR; INTRAVENOUS EVERY 10 MIN PRN
Status: DISCONTINUED | OUTPATIENT
Start: 2019-07-01 | End: 2019-07-01 | Stop reason: HOSPADM

## 2019-07-01 RX ORDER — UREA 10 %
3 LOTION (ML) TOPICAL NIGHTLY PRN
Status: DISCONTINUED | OUTPATIENT
Start: 2019-07-01 | End: 2019-07-02 | Stop reason: HOSPADM

## 2019-07-01 RX ORDER — SCOLOPAMINE TRANSDERMAL SYSTEM 1 MG/1
PATCH, EXTENDED RELEASE TRANSDERMAL
Status: COMPLETED
Start: 2019-07-01 | End: 2019-07-01

## 2019-07-01 RX ORDER — 0.9 % SODIUM CHLORIDE 0.9 %
VIAL (ML) INJECTION
Status: DISCONTINUED
Start: 2019-07-01 | End: 2019-07-01 | Stop reason: HOSPADM

## 2019-07-01 RX ORDER — KETOROLAC TROMETHAMINE 30 MG/ML
INJECTION, SOLUTION INTRAMUSCULAR; INTRAVENOUS PRN
Status: DISCONTINUED | OUTPATIENT
Start: 2019-07-01 | End: 2019-07-01 | Stop reason: SDUPTHER

## 2019-07-01 RX ORDER — POTASSIUM CHLORIDE 20 MEQ/1
40 TABLET, EXTENDED RELEASE ORAL PRN
Status: DISCONTINUED | OUTPATIENT
Start: 2019-07-01 | End: 2019-07-02 | Stop reason: HOSPADM

## 2019-07-01 RX ORDER — EZETIMIBE 10 MG/1
10 TABLET ORAL DAILY
Status: DISCONTINUED | OUTPATIENT
Start: 2019-07-01 | End: 2019-07-02 | Stop reason: HOSPADM

## 2019-07-01 RX ORDER — SODIUM CHLORIDE 0.9 % (FLUSH) 0.9 %
10 SYRINGE (ML) INJECTION PRN
Status: DISCONTINUED | OUTPATIENT
Start: 2019-07-01 | End: 2019-07-01

## 2019-07-01 RX ORDER — LAMOTRIGINE 25 MG/1
50 TABLET ORAL DAILY
Status: DISCONTINUED | OUTPATIENT
Start: 2019-07-01 | End: 2019-07-02 | Stop reason: HOSPADM

## 2019-07-01 RX ADMIN — IOVERSOL 75 ML: 741 INJECTION INTRA-ARTERIAL; INTRAVENOUS at 12:56

## 2019-07-01 RX ADMIN — ROPINIROLE HYDROCHLORIDE 0.25 MG: 0.25 TABLET, FILM COATED ORAL at 22:11

## 2019-07-01 RX ADMIN — DEXAMETHASONE SODIUM PHOSPHATE 8 MG: 10 INJECTION, SOLUTION INTRAMUSCULAR; INTRAVENOUS at 07:42

## 2019-07-01 RX ADMIN — MIDAZOLAM HYDROCHLORIDE 2 MG: 1 INJECTION, SOLUTION INTRAMUSCULAR; INTRAVENOUS at 07:04

## 2019-07-01 RX ADMIN — MIDAZOLAM HYDROCHLORIDE 2 MG: 1 INJECTION, SOLUTION INTRAMUSCULAR; INTRAVENOUS at 07:26

## 2019-07-01 RX ADMIN — FENTANYL CITRATE 50 MCG: 50 INJECTION INTRAMUSCULAR; INTRAVENOUS at 08:57

## 2019-07-01 RX ADMIN — KETOROLAC TROMETHAMINE 30 MG: 30 INJECTION, SOLUTION INTRAMUSCULAR; INTRAVENOUS at 08:43

## 2019-07-01 RX ADMIN — SIMVASTATIN 20 MG: 10 TABLET, FILM COATED ORAL at 22:10

## 2019-07-01 RX ADMIN — FENTANYL CITRATE 50 MCG: 50 INJECTION INTRAMUSCULAR; INTRAVENOUS at 08:39

## 2019-07-01 RX ADMIN — VANCOMYCIN HYDROCHLORIDE 1500 MG: 1 INJECTION, POWDER, LYOPHILIZED, FOR SOLUTION INTRAVENOUS at 07:38

## 2019-07-01 RX ADMIN — ACETAMINOPHEN 1000 MG: 500 TABLET ORAL at 06:36

## 2019-07-01 RX ADMIN — SODIUM CHLORIDE 80 ML: 9 INJECTION, SOLUTION INTRAVENOUS at 12:56

## 2019-07-01 RX ADMIN — Medication 10 ML: at 12:57

## 2019-07-01 RX ADMIN — Medication 0.4 MG: at 08:15

## 2019-07-01 RX ADMIN — LAMOTRIGINE 50 MG: 25 TABLET ORAL at 22:11

## 2019-07-01 RX ADMIN — ONDANSETRON 4 MG: 2 INJECTION, SOLUTION INTRAMUSCULAR; INTRAVENOUS at 08:43

## 2019-07-01 RX ADMIN — SODIUM CHLORIDE, POTASSIUM CHLORIDE, SODIUM LACTATE AND CALCIUM CHLORIDE: 600; 310; 30; 20 INJECTION, SOLUTION INTRAVENOUS at 06:46

## 2019-07-01 RX ADMIN — GABAPENTIN 800 MG: 400 CAPSULE ORAL at 06:39

## 2019-07-01 RX ADMIN — SODIUM CHLORIDE, POTASSIUM CHLORIDE, SODIUM LACTATE AND CALCIUM CHLORIDE: 600; 310; 30; 20 INJECTION, SOLUTION INTRAVENOUS at 09:40

## 2019-07-01 RX ADMIN — SODIUM CHLORIDE, POTASSIUM CHLORIDE, SODIUM LACTATE AND CALCIUM CHLORIDE: 600; 310; 30; 20 INJECTION, SOLUTION INTRAVENOUS at 16:06

## 2019-07-01 RX ADMIN — ACETAMINOPHEN 1000 MG: 500 TABLET ORAL at 22:11

## 2019-07-01 RX ADMIN — TRANEXAMIC ACID 1000 MG: 100 INJECTION, SOLUTION INTRAVENOUS at 07:40

## 2019-07-01 RX ADMIN — TRANEXAMIC ACID 1000 MG: 100 INJECTION, SOLUTION INTRAVENOUS at 08:47

## 2019-07-01 RX ADMIN — KETOROLAC TROMETHAMINE 15 MG: 15 INJECTION INTRAMUSCULAR; INTRAVENOUS at 22:11

## 2019-07-01 RX ADMIN — PROPOFOL 200 MG: 10 INJECTION, EMULSION INTRAVENOUS at 07:39

## 2019-07-01 RX ADMIN — OXYCODONE HYDROCHLORIDE AND ACETAMINOPHEN 1 TABLET: 5; 325 TABLET ORAL at 22:58

## 2019-07-01 RX ADMIN — FENTANYL CITRATE 50 MCG: 50 INJECTION INTRAMUSCULAR; INTRAVENOUS at 08:10

## 2019-07-01 RX ADMIN — Medication 1000 MG: at 06:36

## 2019-07-01 RX ADMIN — FENTANYL CITRATE 50 MCG: 50 INJECTION INTRAMUSCULAR; INTRAVENOUS at 07:34

## 2019-07-01 RX ADMIN — CLONAZEPAM 0.5 MG: 0.5 TABLET ORAL at 22:20

## 2019-07-01 RX ADMIN — FENTANYL CITRATE 25 MCG: 50 INJECTION, SOLUTION INTRAMUSCULAR; INTRAVENOUS at 09:39

## 2019-07-01 SDOH — HEALTH STABILITY: MENTAL HEALTH: HOW OFTEN DO YOU HAVE A DRINK CONTAINING ALCOHOL?: MONTHLY OR LESS

## 2019-07-01 ASSESSMENT — PULMONARY FUNCTION TESTS
PIF_VALUE: 7
PIF_VALUE: 4
PIF_VALUE: 3
PIF_VALUE: 5
PIF_VALUE: 6
PIF_VALUE: 3
PIF_VALUE: 4
PIF_VALUE: 4
PIF_VALUE: 5
PIF_VALUE: 4
PIF_VALUE: 6
PIF_VALUE: 19
PIF_VALUE: 4
PIF_VALUE: 4
PIF_VALUE: 6
PIF_VALUE: 2
PIF_VALUE: 0
PIF_VALUE: 6
PIF_VALUE: 2
PIF_VALUE: 7
PIF_VALUE: 6
PIF_VALUE: 1
PIF_VALUE: 18
PIF_VALUE: 4
PIF_VALUE: 5
PIF_VALUE: 4
PIF_VALUE: 6
PIF_VALUE: 6
PIF_VALUE: 7
PIF_VALUE: 6
PIF_VALUE: 5
PIF_VALUE: 5
PIF_VALUE: 6
PIF_VALUE: 6
PIF_VALUE: 4
PIF_VALUE: 5
PIF_VALUE: 2
PIF_VALUE: 4
PIF_VALUE: 7
PIF_VALUE: 6
PIF_VALUE: 5
PIF_VALUE: 4
PIF_VALUE: 5
PIF_VALUE: 5
PIF_VALUE: 7
PIF_VALUE: 6
PIF_VALUE: 6
PIF_VALUE: 2
PIF_VALUE: 5
PIF_VALUE: 19
PIF_VALUE: 1
PIF_VALUE: 4
PIF_VALUE: 6
PIF_VALUE: 5
PIF_VALUE: 4
PIF_VALUE: 5
PIF_VALUE: 6
PIF_VALUE: 5
PIF_VALUE: 7
PIF_VALUE: 5
PIF_VALUE: 4
PIF_VALUE: 5
PIF_VALUE: 6
PIF_VALUE: 6
PIF_VALUE: 5
PIF_VALUE: 5
PIF_VALUE: 6
PIF_VALUE: 1
PIF_VALUE: 4
PIF_VALUE: 6
PIF_VALUE: 6
PIF_VALUE: 5
PIF_VALUE: 5
PIF_VALUE: 6
PIF_VALUE: 4
PIF_VALUE: 6
PIF_VALUE: 6
PIF_VALUE: 9
PIF_VALUE: 4
PIF_VALUE: 14
PIF_VALUE: 6
PIF_VALUE: 4
PIF_VALUE: 4
PIF_VALUE: 1
PIF_VALUE: 6
PIF_VALUE: 4
PIF_VALUE: 5
PIF_VALUE: 4
PIF_VALUE: 5
PIF_VALUE: 5
PIF_VALUE: 6
PIF_VALUE: 5
PIF_VALUE: 5
PIF_VALUE: 6
PIF_VALUE: 5
PIF_VALUE: 4
PIF_VALUE: 5

## 2019-07-01 ASSESSMENT — PAIN DESCRIPTION - LOCATION
LOCATION: KNEE
LOCATION: KNEE
LOCATION: LEG
LOCATION: KNEE
LOCATION: KNEE;LEG
LOCATION: KNEE;LEG
LOCATION: KNEE

## 2019-07-01 ASSESSMENT — PAIN DESCRIPTION - ORIENTATION
ORIENTATION: LEFT
ORIENTATION: LEFT;OUTER
ORIENTATION: LEFT
ORIENTATION: LEFT

## 2019-07-01 ASSESSMENT — PAIN DESCRIPTION - PAIN TYPE
TYPE: SURGICAL PAIN
TYPE: ACUTE PAIN
TYPE: SURGICAL PAIN
TYPE: SURGICAL PAIN

## 2019-07-01 ASSESSMENT — PAIN DESCRIPTION - DESCRIPTORS
DESCRIPTORS: ACHING
DESCRIPTORS: ACHING;DISCOMFORT
DESCRIPTORS: DISCOMFORT
DESCRIPTORS: ACHING;DISCOMFORT

## 2019-07-01 ASSESSMENT — PAIN DESCRIPTION - PROGRESSION
CLINICAL_PROGRESSION: GRADUALLY IMPROVING

## 2019-07-01 ASSESSMENT — PAIN - FUNCTIONAL ASSESSMENT
PAIN_FUNCTIONAL_ASSESSMENT: PREVENTS OR INTERFERES SOME ACTIVE ACTIVITIES AND ADLS
PAIN_FUNCTIONAL_ASSESSMENT: PREVENTS OR INTERFERES WITH MANY ACTIVE NOT PASSIVE ACTIVITIES
PAIN_FUNCTIONAL_ASSESSMENT: 0-10
PAIN_FUNCTIONAL_ASSESSMENT: PREVENTS OR INTERFERES SOME ACTIVE ACTIVITIES AND ADLS

## 2019-07-01 ASSESSMENT — PAIN SCALES - GENERAL
PAINLEVEL_OUTOF10: 5
PAINLEVEL_OUTOF10: 4
PAINLEVEL_OUTOF10: 0
PAINLEVEL_OUTOF10: 2
PAINLEVEL_OUTOF10: 2
PAINLEVEL_OUTOF10: 10
PAINLEVEL_OUTOF10: 5
PAINLEVEL_OUTOF10: 5
PAINLEVEL_OUTOF10: 4

## 2019-07-01 ASSESSMENT — PAIN DESCRIPTION - ONSET
ONSET: ON-GOING
ONSET: ON-GOING
ONSET: GRADUAL

## 2019-07-01 ASSESSMENT — PAIN DESCRIPTION - FREQUENCY
FREQUENCY: INTERMITTENT

## 2019-07-01 ASSESSMENT — ENCOUNTER SYMPTOMS
STRIDOR: 0
SHORTNESS OF BREATH: 0

## 2019-07-01 ASSESSMENT — LIFESTYLE VARIABLES: SMOKING_STATUS: 0

## 2019-07-01 NOTE — ED PROVIDER NOTES
[facility]  eMERGENCY dEPARTMENT eNCOUnter      Pt Name: Surekha Diamond  MRN: [id number]  [place] 1970  Date of evaluation: 7/1/19      CHIEF COMPLAINT       Chief Complaint   Patient presents with    Altered Mental Status         HISTORY OF PRESENT ILLNESS    Surekha Diamond is a 46 y.o. female who presents to the emergency department transferred from postoperative recovery area after patient underwent left knee replacement under spinal and general anesthesia this morning. She complained of left knee pain after the surgery and was given fentanyl 25 µg IV push. She has had decreased level of consciousness and did not respond to verbal and painful stimuli. Patient received fentanyl, Versed, Zofran, Toradol, dexamethasone and a propofol for  Surgery. There was questionable left arm weakness as focal neurological deficit and this patient is transferred to the emergency department. REVIEW OF SYSTEMS       Review of Systems   Unable to perform ROS: Mental status change   Neurological: Positive for weakness. Decreased level of consciousness        PAST MEDICAL HISTORY    has a past medical history of Asthma, Bowel perforation ([address] Diverticula of intestine, Fatty liver, GERD (gastroesophageal reflux disease), Hepatic steatosis, Hepatomegaly, Hyperlipidemia, Nausea & vomiting, Osteoarthritis, and Vasodepressor syncope. SURGICAL HISTORY      has a past surgical history that includes Endometrial ablation; Hysterectomy; sigmoidectomy; ventral hernia repair (09/25/14); Cholecystectomy, laparoscopic (09/25/14); other surgical history (Right, 7/2/15); Colonoscopy; joint replacement (Right); colostomy; and pr office/outpt visit,procedure only (N/A, 6/18/2018). CURRENT MEDICATIONS       Previous Medications    ALBUTEROL (PROVENTIL HFA;VENTOLIN HFA) 108 (90 BASE) MCG/ACT INHALER    Inhale 2 puffs into the lungs every 6 hours as needed for Wheezing or Shortness of Breath.     ALENDRONATE (FOSAMAX) 70 MG TABLET    Take 70 mg by mouth every 7 days    ASPIRIN 81 MG TABLET    Take 81 mg by mouth daily    CLONAZEPAM (KLONOPIN) 0.5 MG TABLET    TAKE ONE (1) TABLET BY MOUTH TWO (2) TIMES DAILY    DULOXETINE (CYMBALTA) 60 MG EXTENDED RELEASE CAPSULE    TAKE ONE (1) CAPSULE BY MOUTH ONCE DAILY    EZETIMIBE (ZETIA) 10 MG TABLET    Take 10 mg by mouth daily    FLUDROCORTISONE (FLORINEF) 0.1 MG TABLET    Take 0.1 mg by mouth daily. LAMOTRIGINE (LAMICTAL) 25 MG TABLET    Take 25 mg by mouth daily    LISINOPRIL (PRINIVIL;ZESTRIL) 2.5 MG TABLET    Take 1 tablet by mouth daily    METOPROLOL (LOPRESSOR) 100 MG TABLET    Take 100 mg by mouth daily. OXYCODONE-ACETAMINOPHEN (PERCOCET) 5-325 MG PER TABLET    Take 1-2 tablets by mouth every 4 hours as needed for Pain (every 4-6 hours) for up to 40 days. PANTOPRAZOLE (PROTONIX) 40 MG TABLET    TAKE ONE TABLET BY MOUTH DAILY    PREMARIN 1.25 MG TABLET    1 tablet daily    ROPINIROLE (REQUIP) 0.25 MG TABLET    Take by mouth nightly    SIMVASTATIN (ZOCOR) 20 MG TABLET    TAKE 1 TABLET DAILY AT BEDTIME       ALLERGIES     is allergic to bactrim [sulfamethoxazole-trimethoprim]; dilaudid [hydromorphone]; morphine and related; sulfa antibiotics; tramadol; keflex [cephalexin]; penicillins; talwin [pentazocine]; codeine; and fluconazole. FAMILY HISTORY     indicated that her mother is alive. She indicated that her father is alive. She indicated that the status of her maternal grandmother is unknown.     family history includes Breast Cancer in her maternal grandmother; COPD in her father; Cancer in her father; Coronary Art Dis in her father; Diabetes in her mother; High Blood Pressure in her mother; High Cholesterol in her mother; Osteoporosis in her mother. SOCIAL HISTORY      reports that she has never smoked. She has never used smokeless tobacco. She reports that she drinks alcohol. She reports that she does not use drugs.     PHYSICAL EXAM     INITIAL VITALS: height is 5' 5.75\" (1.67 m) and weight is 103 kg (227 lb 1.2 oz). Her blood pressure is 167/92 (abnormal) and her pulse is 99. Her respiration is 16. Physical Exam   Constitutional: She appears well-developed and well-nourished. HENT:   Head: Normocephalic and atraumatic. Nose: Nose normal.   Mouth/Throat: Oropharynx is clear and moist.   Eyes: Pupils are equal, round, and reactive to light. EOM are normal.   Neck: Normal range of motion. Neck supple. Cardiovascular: Normal rate, regular rhythm, normal heart sounds and intact distal pulses. No murmur heard. Pulmonary/Chest: Effort normal and breath sounds normal. No respiratory distress. Abdominal: Soft. Bowel sounds are normal. She exhibits no distension. There is no tenderness. Neurological: She is unresponsive. Skin: Skin is warm and dry. Nursing note and vitals reviewed.         DIFFERENTIAL DIAGNOSIS/ MDM:     Transient ischemic attack, cerebrovascular accident, prolonged recovery from general anesthesia, electrolyte imbalance, cardiac arrhythmia, ACS    DIAGNOSTIC RESULTS     EKG: All EKG's are interpreted by the Emergency Department Physician who either signs or Co-signs this chart in the absence of a cardiologist.    EKG Interpretation    Interpreted by emergency department physician    Rhythm: normal sinus   Rate: normal  Axis: normal  Ectopy: premature ventricular contractions (unifocal) and premature ventricular contractions (infrequent)  Conduction: normal  ST Segments: no acute change  T Waves: no acute change  Q Waves: none    Clinical Impression: non-specific EKG    Gerardo Delgado    RADIOLOGY:   Non-plain film images such as CT, Ultrasound and MRI are read by the radiologist. Freida Padilla radiographic images are visualized and the radiologist interpretations are reviewed as follows:     Xr Knee Left (1-2 Views)    Result Date: 7/1/2019  EXAMINATION: TWO XRAY VIEWS OF THE LEFT KNEE 7/1/2019 9:54 am COMPARISON: 06/07/2019 HISTORY:

## 2019-07-01 NOTE — ANESTHESIA POSTPROCEDURE EVALUATION
POST- ANESTHESIA EVALUATION       Pt Name: Dari Livingston  MRN: [id number]  YOB: 1970  Date of evaluation: 7/1/2019  Time:  10:18 AM      BP (!) 164/82   Pulse 98   Temp 98 °F (36.7 °C) (Temporal)   Resp 16   Ht 5' 6\" (1.676 m)   Wt 227 lb 4.7 oz (103.1 kg)   SpO2 98%   BMI 36.69 kg/m²      Consciousness Level  Awake  Cardiopulmonary Status  Stable  Pain Adequately Treated YES  Nausea / Vomiting  NO  Adequate Hydration  YES  Anesthesia Related Complications NONE      Electronically signed by Melissa Mejia MD on 7/1/2019 at 10:18 AM       Department of Anesthesiology  Postprocedure Note    Patient: Dari Livingston  MRN: [id number]  [place]: 1970  Date of evaluation: 7/1/2019  Time:  10:18 AM     Procedure Summary     Date:  07/01/19 Room / Location:  [facility] OR [address] [place] [address] OR    Anesthesia Start:  0727 Anesthesia Stop:  6685    Procedure:  KNEE TOTAL ARTHROPLASTY - LEFT WITH BIOMET AND GPS C ARM NEEDED (X-RAY NOTIFIED) (Left Knee) Diagnosis:  (DJD LEFT KNEE)    Surgeon:  Ashley Johnson MD Responsible Provider:  Melissa Mejia MD    Anesthesia Type:  spinal, general ASA Status:  2          Anesthesia Type: No value filed. Alcira Phase I: Alcira Score: 7    Alcira Phase II:      Last vitals: Reviewed and per EMR flowsheets.        Anesthesia Post Evaluation

## 2019-07-01 NOTE — ANESTHESIA PRE PROCEDURE
Neuro/Psych:   (+) depression/anxiety    (-) seizures, neuromuscular disease, TIA, CVA, headaches and psychiatric history           GI/Hepatic/Renal:   (+) GERD: no interval change, liver disease:,      (-) hiatal hernia, PUD, hepatitis, no renal disease, bowel prep and no morbid obesity       Endo/Other:    (+) : arthritis: OA., no malignancy/cancer. (-) diabetes mellitus, hypothyroidism, hyperthyroidism, blood dyscrasia, no electrolyte abnormalities, no malignancy/cancer               Abdominal:   (+) obese,     Abdomen: soft. Vascular: negative vascular ROS. - PVD, DVT and PE. Anesthesia Plan      spinal and general     ASA 2     (Spinal and ACB/IPACK, may need GA, benefits, risks and complications of spinal and GA d/w patient and family, they understand and agree to proceed)      MIPS: Postoperative opioids intended and Prophylactic antiemetics administered. Anesthetic plan and risks discussed with patient.                       Rudi Thacker MD   7/1/2019

## 2019-07-01 NOTE — OP NOTE
Operative dictation  Preop diagnosis: DJD left knee  Postoperative diagnosis: Same  Procedure: Left total knee arthroplasty utilizing Biomet Vanguard prosthesis size 60 cruciate retaining femur 67 tibia with a 18 mm anterior stabilized E1 poly-and a 34 standard patella  Surgeons: Sergey Kevin  Assistant: Leo Marin DO  Anesthesia: General with adductor canal block    Patient is a 69-year-old patient whose had a previous right unicompartmental arthroplasty doing well. She now presented for problems for her left side however it was felt that she was not a good candidate for partial as she was noted to have significant patellofemoral narrowing on her x-rays and then she was very symptomatic as well. A having failed conservative treatments with the patient would benefit from arthroplasty in the form of a total knee consent was obtained with good comprehension of all risks and benefits    Patient was given 2 g Ancef in the holding area as well as a adductor canal block. She was then taken operative suite where general anesthesia was administered after spinal was unsuccessful. A tourniquet was applied to to the left thigh in the left lower extremity was then prepped and draped in usual fashion. Leg was exsanguinated and the tourniquet inflated to [address] [place]. Timeout was then called to verify laterality. A midline incision centered over the patella was taken down the subcu where a mid vastus splitting approach knee was performed. After synovectomy was carried out the patella was placed on the side and calibrated for thickness. A freehand resection was made with an oscillating saw. The size 34 patella drug I gave the best overall fit and after drilling the triple pegs at 34 standard patella was selected. The knee was then flexed to reveal arthrosis significantly on the medial side as well as the trochlear groove. Osteophytes were removed with a rondure.   A drill hole was made in the distal femur the femoral canal was accessed with a large drill bit at this was then irrigated and suctioned and a fluted IM nate was then positioned and distal femoral cut was made 5 degrees of valgus of +2. The extra medullary tibial cut is then positioned in reference to the tubercle and ankle joint may precut and 90 degrees long axis with slight posterior slope and minimal cutting. The sizing guide to 3 degrees extra rotation was placed the cuff femoral surface a size for 60. The drill holes were made 60 cutting block was positioned proper external rotation was verified with the trans-epicondylar axis. Satisfied with this anterior posterior chamfer cuts were made the bone fragments removed. Utilizing labs varus and osteotomes posterior osteophytes removed as well as the posterior capsule stripped from this and then also injected with the orthopedic cocktail. The tibial cut was then assessed with a baseplate and laparotomy found to be good. Spacer blocks were inserted and it found that a moderate medial posterior medial release as well as removal of the medial tube aspirates facilitated symmetric gaps. The 67 baseplate gave the best overall fit to allow for alignment nate to be down the middle third of the tubercle. This was pinned in place the target was applied proximal tibia was reamed feeds impacted. This was trialed with a 60 femur and then first a 16 and then 18 mm anterior stabilized probably gave good range of motion and stability as well as patella tracking patellar height. The drill holes were made in the distal femur all the trial components removed from the knee was irrigated and dried and the cement prepared. Cement was then placed to both implants in both cut surfaces and these were impacted and compressed with an 18 probably in excess cement removed. The patella so patella was also applied and clamped the knee that was then injected with remainder 100 cc of total joint solution.   Betadine lavage was carried out for 3 minutes this was then irrigated and dried inspected thoroughly for any further soft tissue cement debris. Upon re-trialing elected over the permanent 18 mm anterior stabilized E1 poly-which was placed and locked. Knee was sprayed with platelet rich platelet poor bladder sprayed the terms deflated at 42 minutes. Hemostasis was excellent. The arthrotomy was closed with #1 strata fix while the vastus was closed in 0 Vicryl double layer fashion. Subcu was closed with a 2-0 Monocryl strata fix and skin staples for the skin and cover with Aquasol dressing.   The patient was awakened taken to postanesthesia care unit in good condition    Estimated blood loss minimal

## 2019-07-01 NOTE — H&P
Active member of club or organization: None     Attends meetings of clubs or organizations: None     Relationship status: None    Intimate partner violence:     Fear of current or ex partner: None     Emotionally abused: None     Physically abused: None     Forced sexual activity: None   Other Topics Concern    None   Social History Narrative    None     Family History:  Family History   Problem Relation Age of Onset    Diabetes Mother     High Blood Pressure Mother     High Cholesterol Mother     Osteoporosis Mother     COPD Father     Coronary Art Dis Father     Cancer Father         lung    Breast Cancer Maternal Grandmother        REVIEW OF SYSTEMS:  Constitutional: Negative for fever and chills. HENT: Negative for congestion or drainage   Eyes: Negative for blurred vision and double vision. Respiratory: Negative for cough, shortness of breath and wheezing. Cardiovascular: Negative for chest pain and palpitations. Gastrointestinal: Negative for nausea. Negative for vomiting. Musculoskeletal: Positive for myalgias and joint pain. Skin: Negative for itching and rash. Neurological: Negative for dizziness, sensory change and headaches. Psychiatric/Behavioral: Negative for depression and suicidal ideas. PHYSICAL EXAM:  Blood pressure (!) 155/110, pulse 78, temperature 98 °F (36.7 °C), temperature source Temporal, resp. rate 24, height 5' 6\" (1.676 m), weight 227 lb 4.7 oz (103.1 kg), SpO2 100 %. Gen: alert and oriented, NAD, cooperative  Head: normocephalic atraumatic   Cardiovascular: Regular rate, no dependent edema, distal pulses 2+  Respiratory: Chest symmetric, no accessory muscle use, normal respirations  LLE: no deformities. Skin intact. Compartments soft. EHL/FHL/TA/GS complex motor intact. Sural, saphenous, superificial/deep peroneal, and plantar nerve distribution SILT.  Leg warm and well perfused      A/P: 46 y.o. female  was evaluated and after discussion surgical and non surgical options, the patient has decided to undergo L TKA  Consent obtained and in chart. All questions answered appropriately. Surgical risks including but not limited to: bleeding, blood clots, infection, damage to surrounding tissues/nerves/vessels, failure of fixation, failure of wounds to heal, loss of motion, stiffness, dislocation, postoperative pain, recurrence of symptoms, need for future surgery,  risks of anesthesia, loss of limb and loss of life were all discussed with the patient. Knowing these risks, the patient wishes to proceed with surgery. -Abx OCTOR  -Site marked, Consent in chart  -AC held  -NPO since midnight  -All question answered. Roberto Grimes DO   Orthopedic Surgery Resident  [facility], [facility]

## 2019-07-01 NOTE — ANESTHESIA PROCEDURE NOTES
Peripheral Block    Patient location during procedure: pre-op  Start time: 7/1/2019 6:50 AM  End time: 7/1/2019 7:20 AM  Staffing  Anesthesiologist: Jcarlos Hernandez MD  Performed: anesthesiologist   Preanesthetic Checklist  Completed: patient identified, site marked, surgical consent, pre-op evaluation, timeout performed, IV checked, risks and benefits discussed, monitors and equipment checked, anesthesia consent given, oxygen available and patient being monitored  Peripheral Block  Patient position: supine  Prep: ChloraPrep  Patient monitoring: cardiac monitor, continuous pulse ox, frequent blood pressure checks and IV access  Block type: iPacks (adductor canal block)  Laterality: left  Injection technique: catheter  Procedures: ultrasound guided  Local infiltration: bupivacaine (ACB with 0.25% Bupivacaine 25 ml and IPACK 15 ml)  Infiltration strength: 0.25 %  Dose: 25 mL  Provider prep: mask and sterile gloves  Expiration date: 5/31/2020  Local infiltration: bupivacaine (ACB with 0.25% Bupivacaine 25 ml and IPACK 15 ml)  Needle  Needle type: Tuohy   Needle gauge: 18 G  Needle length: 10 cm  Needle localization: ultrasound guidance  Needle insertion depth: 8 cm  Catheter type: open end  Catheter size: 19 G  Catheter at skin depth: 8 cm  Test dose: negative  Lot number: [id number]  Assessment  Injection assessment: negative aspiration for heme, no paresthesia on injection and local visualized surrounding nerve on ultrasound  Paresthesia pain: none  Slow fractionated injection: yes  Hemodynamics: stable  Reason for block: post-op pain management and at surgeon's request

## 2019-07-01 NOTE — H&P
[facility]    HISTORY AND PHYSICAL EXAMINATION            Date:   7/1/2019  Patient name:  Yehuda Tracy  Date of admission:  7/1/2019 11:02 AM  MRN:   [id number]  Account:  [de-identified]  YOB: 1970  PCP:    LIZ Chakraborty CNP  Room:   311/311-01  Code Status:    Full Code    Chief Complaint:     Chief Complaint   Patient presents with    Altered Mental Status       History Obtained From:     patient, electronic medical record    History of Present Illness:     Quoc Hooper is a 46 y.o. female who was admitted for elective left total knee arthroplasty this a.m. Postoperatively she was minimally responsive and had left-sided weakness. The patient was evaluated in the ED for possible stroke. CT brain and CTA head and neck were negative. Dr Aline Hobson, neuro interventionalist, felt it was safe for her to be admitted to [facility]. Liver function studies and urinalysis were all. She has completely recovered. She has had knee pain for 10 yrs and has failed injections and PT. She has a hx of slow recovery from anaesthesia and is very sensitive to narcotics. Currently rates her pain as a 3. Patient states she is having significant left ant thigh pain stephen with flexion of the hip, she feels the nerve block is not working.      Past Medical History:     Past Medical History:   Diagnosis Date    Asthma     Bowel perforation ([address]     Diverticula of intestine     Fatty liver     GERD (gastroesophageal reflux disease)     Hepatic steatosis     Hepatomegaly     Hyperlipidemia     Nausea & vomiting     Osteoarthritis     Vasodepressor syncope         Past Surgical History:     Past Surgical History:   Procedure Laterality Date    CHOLECYSTECTOMY, LAPAROSCOPIC  09/25/14    COLONOSCOPY      COLOSTOMY      AND REVERSAL after bowel perforation    ENDOMETRIAL ABLATION      HYSTERECTOMY      JOINT REPLACEMENT Right     PARTIAL (PROVENTIL HFA;VENTOLIN HFA) 108 (90 BASE) MCG/ACT inhaler Inhale 2 puffs into the lungs every 6 hours as needed for Wheezing or Shortness of Breath. Yes Historical Provider, MD   melatonin 3 MG TABS tablet Take 3 mg by mouth nightly as needed    Historical Provider, MD        Allergies:     Bactrim [sulfamethoxazole-trimethoprim]; Dilaudid [hydromorphone]; Morphine and related; Sulfa antibiotics; Tramadol; Keflex [cephalexin]; Penicillins; Talwin [pentazocine]; Codeine; Fentanyl; and Fluconazole    Social History:     Tobacco:    reports that she has never smoked. She has never used smokeless tobacco.  Alcohol:      reports that she drinks about 1.2 oz of alcohol per week. Drug Use:  reports that she does not use drugs. Family History:     Family History   Problem Relation Age of Onset    Diabetes Mother     High Blood Pressure Mother     High Cholesterol Mother     Osteoporosis Mother     COPD Father     Coronary Art Dis Father     Cancer Father         lung    Breast Cancer Maternal Grandmother        Review of Systems:     Positive and Negative as described in HPI. CONSTITUTIONAL:  negative for fevers, chills, sweats, fatigue, weight loss  HEENT:  negative for vision, hearing changes, runny nose, throat pain  RESPIRATORY:  + for shortness of breath with activity, no cough, congestion, wheezing. Only using her albuterol inhaler when has acute upper respiratory infection-last time was several months ago  CARDIOVASCULAR:  negative for chest pain, palpitations, + increased heart rate with activity.   GASTROINTESTINAL:  negative for nausea, vomiting, diarrhea, constipation, change in bowel habits, abdominal pain, BM yesterday   GENITOURINARY:  negative for difficulty of urination, burning with urination, frequency   INTEGUMENT:  negative for rash, skin lesions, easy bruising   HEMATOLOGIC/LYMPHATIC:  negative for swelling/edema   ALLERGIC/IMMUNOLOGIC:  negative for urticaria , itching  ENDOCRINE: negative increase in drinking, increase in urination, hot or cold intolerance  MUSCULOSKELETAL:  + bilat knee joint pains Lt > rt, + muscle aches calves and hips, +swelling of joints  NEUROLOGICAL:  + for headaches q am, no dizziness, lightheadedness, no numbness, + pain back-hips-knees, no tingling extremities  BEHAVIOR/PSYCH:  negative for depression, anxiety controlled with medication    Physical Exam:   BP (!) 145/76   Pulse 66   Temp 98.1 °F (36.7 °C) (Oral)   Resp 16   Ht 5' 5.75\" (1.67 m)   Wt 227 lb 1.2 oz (103 kg)   SpO2 97%   BMI 36.93 kg/m²   Temp (24hrs), Av.1 °F (37.3 °C), Min:84.6 °F (29.2 °C), Max:100.9 °F (38.3 °C)    Recent Labs     19  1015   POCGLU 150*       Intake/Output Summary (Last 24 hours) at 2019 1744  Last data filed at 2019 1515  Gross per 24 hour   Intake --   Output 750 ml   Net -750 ml       General Appearance:  alert, well appearing, and in no acute distress  Mental status: oriented to person, place, and time with normal affect  Head:  normocephalic, atraumatic. Eye: no icterus, redness, pupils equal and reactive, extraocular eye movements intact, conjunctiva clear  Ear: normal external ear, no discharge, hearing intact  Nose:  no drainage noted, no sinus tenderness  Mouth: mucous membranes moist, good dentition, low hanging soft palate  Neck: supple, no carotid bruits, thyroid not palpable  Lungs: Bilateral equal air entry, clear to ausculation, no wheezing, rales or rhonchi, normal effort  Cardiovascular: normal rate, regular rhythm, no murmur, gallop, rub.   Abdomen: Softly these, nontender, nondistended, normal bowel sounds, no hepatomegaly or splenomegaly  Neurologic: There are no new focal motor or sensory deficits, normal muscle tone and bulk, no abnormal sensation, normal speech, cranial nerves II through XII grossly intact  Skin: No gross lesions, rashes, bruising or bleeding on exposed skin area  Extremities:  peripheral pulses palpable, no pedal

## 2019-07-02 VITALS
BODY MASS INDEX: 36.49 KG/M2 | DIASTOLIC BLOOD PRESSURE: 65 MMHG | HEIGHT: 66 IN | RESPIRATION RATE: 18 BRPM | TEMPERATURE: 98.1 F | OXYGEN SATURATION: 96 % | HEART RATE: 61 BPM | WEIGHT: 227.07 LBS | SYSTOLIC BLOOD PRESSURE: 114 MMHG

## 2019-07-02 LAB
ALBUMIN SERPL-MCNC: 3.4 G/DL (ref 3.5–5.2)
ALBUMIN/GLOBULIN RATIO: 1.3 (ref 1–2.5)
ALP BLD-CCNC: 81 U/L (ref 35–104)
ALT SERPL-CCNC: 14 U/L (ref 5–33)
ANION GAP SERPL CALCULATED.3IONS-SCNC: 11 MMOL/L (ref 9–17)
AST SERPL-CCNC: 19 U/L
BILIRUB SERPL-MCNC: 0.25 MG/DL (ref 0.3–1.2)
BUN BLDV-MCNC: 14 MG/DL (ref 6–20)
BUN/CREAT BLD: ABNORMAL (ref 9–20)
CALCIUM SERPL-MCNC: 8.9 MG/DL (ref 8.6–10.4)
CHLORIDE BLD-SCNC: 105 MMOL/L (ref 98–107)
CO2: 25 MMOL/L (ref 20–31)
CREAT SERPL-MCNC: 0.69 MG/DL (ref 0.5–0.9)
EKG ATRIAL RATE: 100 BPM
EKG P AXIS: 42 DEGREES
EKG P-R INTERVAL: 162 MS
EKG Q-T INTERVAL: 362 MS
EKG QRS DURATION: 94 MS
EKG QTC CALCULATION (BAZETT): 466 MS
EKG T AXIS: 26 DEGREES
EKG VENTRICULAR RATE: 100 BPM
GFR AFRICAN AMERICAN: >60 ML/MIN
GFR NON-AFRICAN AMERICAN: >60 ML/MIN
GFR SERPL CREATININE-BSD FRML MDRD: ABNORMAL ML/MIN/{1.73_M2}
GFR SERPL CREATININE-BSD FRML MDRD: ABNORMAL ML/MIN/{1.73_M2}
GLUCOSE BLD-MCNC: 132 MG/DL (ref 70–99)
HCT VFR BLD CALC: 30.7 % (ref 36–46)
HEMOGLOBIN: 10.6 G/DL (ref 12–16)
MCH RBC QN AUTO: 30.6 PG (ref 26–34)
MCHC RBC AUTO-ENTMCNC: 34.5 G/DL (ref 31–37)
MCV RBC AUTO: 88.6 FL (ref 80–100)
NRBC AUTOMATED: ABNORMAL PER 100 WBC
PDW BLD-RTO: 13.5 % (ref 12.5–15.4)
PLATELET # BLD: 245 K/UL (ref 140–450)
PMV BLD AUTO: 7.3 FL (ref 6–12)
POTASSIUM SERPL-SCNC: 4.1 MMOL/L (ref 3.7–5.3)
RBC # BLD: 3.46 M/UL (ref 4–5.2)
SODIUM BLD-SCNC: 141 MMOL/L (ref 135–144)
TOTAL PROTEIN: 6.1 G/DL (ref 6.4–8.3)
WBC # BLD: 12.3 K/UL (ref 3.5–11)

## 2019-07-02 PROCEDURE — 36415 COLL VENOUS BLD VENIPUNCTURE: CPT

## 2019-07-02 PROCEDURE — 6370000000 HC RX 637 (ALT 250 FOR IP): Performed by: ORTHOPAEDIC SURGERY

## 2019-07-02 PROCEDURE — 97535 SELF CARE MNGMENT TRAINING: CPT

## 2019-07-02 PROCEDURE — 85027 COMPLETE CBC AUTOMATED: CPT

## 2019-07-02 PROCEDURE — 6360000002 HC RX W HCPCS: Performed by: ORTHOPAEDIC SURGERY

## 2019-07-02 PROCEDURE — 80053 COMPREHEN METABOLIC PANEL: CPT

## 2019-07-02 PROCEDURE — 6370000000 HC RX 637 (ALT 250 FOR IP): Performed by: CLINICAL NURSE SPECIALIST

## 2019-07-02 PROCEDURE — 97116 GAIT TRAINING THERAPY: CPT

## 2019-07-02 PROCEDURE — APPSS30 APP SPLIT SHARED TIME 16-30 MINUTES: Performed by: CLINICAL NURSE SPECIALIST

## 2019-07-02 PROCEDURE — 99024 POSTOP FOLLOW-UP VISIT: CPT | Performed by: ORTHOPAEDIC SURGERY

## 2019-07-02 PROCEDURE — 2580000003 HC RX 258: Performed by: CLINICAL NURSE SPECIALIST

## 2019-07-02 PROCEDURE — 97110 THERAPEUTIC EXERCISES: CPT

## 2019-07-02 RX ORDER — ASPIRIN 81 MG/1
81 TABLET, CHEWABLE ORAL 2 TIMES DAILY
Qty: 30 TABLET | Refills: 3 | Status: SHIPPED | OUTPATIENT
Start: 2019-07-02

## 2019-07-02 RX ORDER — OXYCODONE HYDROCHLORIDE AND ACETAMINOPHEN 5; 325 MG/1; MG/1
2 TABLET ORAL EVERY 4 HOURS PRN
Qty: 40 TABLET | Refills: 0 | Status: SHIPPED | OUTPATIENT
Start: 2019-07-02 | End: 2019-07-09

## 2019-07-02 RX ADMIN — ASPIRIN 81 MG 81 MG: 81 TABLET ORAL at 08:20

## 2019-07-02 RX ADMIN — DOCUSATE SODIUM 100 MG: 100 CAPSULE, LIQUID FILLED ORAL at 08:22

## 2019-07-02 RX ADMIN — ACETAMINOPHEN 1000 MG: 500 TABLET ORAL at 14:28

## 2019-07-02 RX ADMIN — METOPROLOL TARTRATE 100 MG: 50 TABLET, FILM COATED ORAL at 08:21

## 2019-07-02 RX ADMIN — DULOXETINE 60 MG: 30 CAPSULE, DELAYED RELEASE ORAL at 08:20

## 2019-07-02 RX ADMIN — LAMOTRIGINE 50 MG: 25 TABLET ORAL at 08:20

## 2019-07-02 RX ADMIN — PANTOPRAZOLE SODIUM 40 MG: 40 TABLET, DELAYED RELEASE ORAL at 08:21

## 2019-07-02 RX ADMIN — EZETIMIBE 10 MG: 10 TABLET ORAL at 08:26

## 2019-07-02 RX ADMIN — CLONAZEPAM 0.5 MG: 0.5 TABLET ORAL at 08:20

## 2019-07-02 RX ADMIN — SODIUM CHLORIDE, POTASSIUM CHLORIDE, SODIUM LACTATE AND CALCIUM CHLORIDE: 600; 310; 30; 20 INJECTION, SOLUTION INTRAVENOUS at 02:29

## 2019-07-02 RX ADMIN — FLUDROCORTISONE ACETATE 0.1 MG: 0.1 TABLET ORAL at 08:20

## 2019-07-02 RX ADMIN — ACETAMINOPHEN 1000 MG: 500 TABLET ORAL at 07:03

## 2019-07-02 RX ADMIN — KETOROLAC TROMETHAMINE 15 MG: 15 INJECTION INTRAMUSCULAR; INTRAVENOUS at 07:03

## 2019-07-02 RX ADMIN — OXYCODONE HYDROCHLORIDE AND ACETAMINOPHEN 1 TABLET: 5; 325 TABLET ORAL at 08:20

## 2019-07-02 RX ADMIN — LISINOPRIL 2.5 MG: 5 TABLET ORAL at 08:21

## 2019-07-02 RX ADMIN — OXYCODONE HYDROCHLORIDE AND ACETAMINOPHEN 1 TABLET: 5; 325 TABLET ORAL at 12:58

## 2019-07-02 RX ADMIN — KETOROLAC TROMETHAMINE 15 MG: 15 INJECTION INTRAMUSCULAR; INTRAVENOUS at 14:28

## 2019-07-02 ASSESSMENT — PAIN DESCRIPTION - LOCATION
LOCATION: KNEE;LEG
LOCATION: KNEE
LOCATION: KNEE
LOCATION: KNEE;LEG
LOCATION: KNEE

## 2019-07-02 ASSESSMENT — PAIN DESCRIPTION - DESCRIPTORS
DESCRIPTORS: ACHING;SORE
DESCRIPTORS: ACHING;SORE
DESCRIPTORS: ACHING;DISCOMFORT
DESCRIPTORS: ACHING;SORE
DESCRIPTORS: ACHING;DISCOMFORT;SORE
DESCRIPTORS: ACHING;SORE
DESCRIPTORS: ACHING;SORE

## 2019-07-02 ASSESSMENT — PAIN SCALES - GENERAL
PAINLEVEL_OUTOF10: 2
PAINLEVEL_OUTOF10: 5
PAINLEVEL_OUTOF10: 6
PAINLEVEL_OUTOF10: 5
PAINLEVEL_OUTOF10: 3
PAINLEVEL_OUTOF10: 6
PAINLEVEL_OUTOF10: 2
PAINLEVEL_OUTOF10: 5
PAINLEVEL_OUTOF10: 5

## 2019-07-02 ASSESSMENT — PAIN DESCRIPTION - FREQUENCY
FREQUENCY: INTERMITTENT

## 2019-07-02 ASSESSMENT — PAIN DESCRIPTION - PAIN TYPE
TYPE: SURGICAL PAIN

## 2019-07-02 ASSESSMENT — PAIN DESCRIPTION - ORIENTATION
ORIENTATION: LEFT

## 2019-07-02 ASSESSMENT — PAIN DESCRIPTION - ONSET
ONSET: ON-GOING
ONSET: ON-GOING

## 2019-07-02 ASSESSMENT — PAIN DESCRIPTION - PROGRESSION
CLINICAL_PROGRESSION: NOT CHANGED
CLINICAL_PROGRESSION: GRADUALLY IMPROVING

## 2019-07-02 NOTE — CONSULTS
[facility] Stroke and Interventional Neurology Consult for  [facility] ED Stroke Alert through [facility] @ 10:02  7/1/2019 2:04 PM  Pt Name: Janay Louis  MRN: [id number]  [place]: 1970  Date of evaluation: 7/1/2019  Primary Care Physician: Michael Guzman, APRN - CNP    Janay Louis is a 46 y.o. female who presents with initially for elective left knee replacement with spinal and general anesthesia. Post op left knee pain receiving 25 mcg fentanyl but sudden unresponsiveness not responding to stimuli and stroke alert activated as also partial left arm less responsive  Patient taken to Ed for evaluation, head ct no hemorrhage. cta head and neck no large vessel occlusion. Possible medication reaction vs seizure vs vagal reaction    Allergies  is allergic to bactrim [sulfamethoxazole-trimethoprim]; dilaudid [hydromorphone]; morphine and related; sulfa antibiotics; tramadol; keflex [cephalexin]; penicillins; talwin [pentazocine]; codeine; fentanyl; and fluconazole. Medications  Prior to Admission medications    Medication Sig Start Date End Date Taking? Authorizing Provider   rOPINIRole (REQUIP) 0.25 MG tablet Take by mouth nightly   Yes Historical Provider, MD   oxyCODONE-acetaminophen (PERCOCET) 5-325 MG per tablet Take 1-2 tablets by mouth every 4 hours as needed for Pain (every 4-6 hours) for up to 40 days.  6/17/19 7/27/19 Yes Shamika Crawford MD   lamoTRIgine (LAMICTAL) 25 MG tablet Take 25 mg by mouth daily   Yes Historical Provider, MD   ezetimibe (ZETIA) 10 MG tablet Take 10 mg by mouth daily   Yes Historical Provider, MD   alendronate (FOSAMAX) 70 MG tablet Take 70 mg by mouth every 7 days   Yes Historical Provider, MD   aspirin 81 MG tablet Take 81 mg by mouth daily   Yes Historical Provider, MD   lisinopril (PRINIVIL;ZESTRIL) 2.5 MG tablet Take 1 tablet by mouth daily 12/27/18  Yes Sanya Montana MD   DULoxetine (CYMBALTA) 60 MG extended release capsule TAKE ONE (1) CAPSULE BY MOUTH ONCE DAILY 8/9/17  Yes Linda White MD   clonazePAM (KLONOPIN) 0.5 MG tablet TAKE ONE (1) TABLET BY MOUTH TWO (2) TIMES DAILY 7/6/17  Yes Linda White MD   pantoprazole (PROTONIX) 40 MG tablet TAKE ONE TABLET BY MOUTH DAILY 4/27/17  Yes Linda White MD   simvastatin (ZOCOR) 20 MG tablet TAKE 1 TABLET DAILY AT BEDTIME 2/13/17  Yes Linda White MD   PREMARIN 1.25 MG tablet 1 tablet daily 2/29/16  Yes Historical Provider, MD   metoprolol (LOPRESSOR) 100 MG tablet Take 100 mg by mouth daily. Yes Historical Provider, MD   fludrocortisone (FLORINEF) 0.1 MG tablet Take 0.1 mg by mouth daily. Yes Historical Provider, MD   albuterol (PROVENTIL HFA;VENTOLIN HFA) 108 (90 BASE) MCG/ACT inhaler Inhale 2 puffs into the lungs every 6 hours as needed for Wheezing or Shortness of Breath.    Yes Historical Provider, MD   melatonin 3 MG TABS tablet Take 3 mg by mouth nightly as needed    Historical Provider, MD    Scheduled Meds:   DULoxetine  60 mg Oral Daily    ezetimibe  10 mg Oral Daily    fludrocortisone  0.1 mg Oral Daily    lamoTRIgine  50 mg Oral Daily    lisinopril  2.5 mg Oral Daily    metoprolol  100 mg Oral Daily    pantoprazole  40 mg Oral Daily    estrogens (conjugated)  1.25 mg Oral Daily    rOPINIRole  0.25 mg Oral Nightly    simvastatin  20 mg Oral Nightly    sodium chloride flush  10 mL Intravenous 2 times per day    docusate sodium  100 mg Oral BID    acetaminophen  1,000 mg Oral 3 times per day    ketorolac  15 mg Intravenous 3 times per day    clonazePAM  0.5 mg Oral BID    aspirin  81 mg Oral BID     Continuous Infusions:   lactated ringers 100 mL/hr at 07/02/19 0229     PRN Meds:.albuterol sulfate HFA, melatonin, oxyCODONE-acetaminophen **OR** oxyCODONE-acetaminophen, sodium chloride flush, potassium chloride **OR** potassium alternative oral replacement **OR** potassium chloride, magnesium sulfate, magnesium hydroxide, ondansetron, acetaminophen, bisacodyl  Past Medical

## 2019-07-02 NOTE — PROGRESS NOTES
Result Date: 7/1/2019  Unremarkable CTA of the head and neck. Physical Examination:       General appearance:  alert, cooperative and no distress  Mental Status:  oriented to person, place and time and normal affect  Lungs:  clear to auscultation bilaterally, normal effort  Heart:  regular rate and rhythm, no murmur  Abdomen:  soft, nontender, nondistended, normal bowel sounds, no masses, hepatomegaly, splenomegaly  Extremities: There is mild generalized edema to the left leg, the peripheral nerve block is intact, the left knee dressing is clean and dry. She has good color warmth movement to the toes. The pain in the thigh is likely due to the tourniquet per Ortho surgery  Skin:  no gross lesions, rashes, induration    Assessment:       Primary Problem  S/P total knee arthroplasty, left    Active Hospital Problems    Diagnosis Date Noted    S/P total knee arthroplasty, left [Z96.652] 07/01/2019    Mild intermittent asthma [J45.20] 07/01/2019    Fibromyalgia [M79.7] 07/01/2019    Essential hypertension [I10] 07/01/2019    History of methicillin resistant Staphylococcus aureus infection [Z86.14] 07/01/2019    Primary osteoarthritis of left knee [M17.12] 07/01/2019    Sleep apnea [G47.30] 07/01/2019    Delayed emergence from general anesthesia Livia Barros 07/01/2019    Altered mental status [R41.82]     Anxiety [F41.9] 03/09/2016    Elevated liver enzymes [R74.8] 02/18/2016    Hepatomegaly [R16.0]     Mixed hyperlipidemia [E78.2] 12/08/2015       Plan:     1. Anticipate discharge home today with PT  2.  Discharge summary for details of plan    LIZ Olivia  7/2/2019  6:56 AM

## 2019-07-02 NOTE — PROGRESS NOTES
Physical Therapy  Facility/Department: [facility] PROGRESSIVE CARE  Daily Treatment Note  NAME: Iesha Díaz  : 1970  MRN: [id number]    Date of Service: 2019    Discharge Recommendations:  Patient would benefit from continued therapy after discharge   PT Equipment Recommendations  Equipment Needed: No  Other: Pt already owns rW- will require use of RW at all times for mobility    Assessment   Body structures, Functions, Activity limitations: Decreased functional mobility ; Decreased strength;Decreased endurance;Decreased balance;Decreased ROM  Assessment: Pt with continued improvements noted with mobility and ROM. Pt able to ambulate 350ft and ascend/descend two steps SB-CGA with equipment this date. Pt with OP PT set up at this point and family available to assist if needed. Prognosis: Good  Patient Education: Educated on stair negotiation  Barriers to Learning: None  REQUIRES PT FOLLOW UP: Yes  Activity Tolerance  Activity Tolerance: Patient Tolerated treatment well     Patient Diagnosis(es): The encounter diagnosis was Altered mental status, unspecified altered mental status type. has a past medical history of Asthma, Bowel perforation ([address] Diverticula of intestine, Fatty liver, GERD (gastroesophageal reflux disease), Hepatic steatosis, Hepatomegaly, Hyperlipidemia, Nausea & vomiting, Osteoarthritis, and Vasodepressor syncope.   has a past surgical history that includes Endometrial ablation; Hysterectomy; sigmoidectomy; ventral hernia repair (14); Cholecystectomy, laparoscopic (14); other surgical history (Right, 7/2/15); Colonoscopy; joint replacement (Right); colostomy; and pr office/outpt visit,procedure only (N/A, 2018).     Restrictions  Restrictions/Precautions  Restrictions/Precautions: Fall Risk, Femoral Block, Weight Bearing  Required Braces or Orthoses?: No  Lower Extremity Weight Bearing Restrictions  Left Lower Extremity Weight Bearing: Weight Bearing As LLE  Heelslides: x15 LLE  Gluteal Sets: x15   Hip Abduction: x15 LLE  Knee Long Arc Quad: x15 LLE  Knee Active Range of Motion: x15 LLE, seated heel slides  Ankle Pumps: x15 LLE   AROM LLE (degrees)  L Knee Flexion 0-145: 100 degrees  L Knee Extension 0: Lacking 1 degree                         Goals  Short term goals  Time Frame for Short term goals: 14 visits  Short term goal 1: Pt to ambulate 300ft modified independently with RW  Short term goal 2: Pt to sit <> stand transfer independently  Short term goal 3: Pt to ascend/descend 4 steps with right rail supervision to allow for safe entrance/exit into the home  Short term goal 4: Pt to demonstrate good standing balance with use of RW to decrease risk of falls  Short term goal 5: Pt to demonstrate independent understanding of supine TKA exercise program  Patient Goals   Patient goals : Pt would like to be in less pain with movement than prior to surgery    Plan    Plan  Times per week: BID/7days  Times per day: Twice a day  Current Treatment Recommendations: Strengthening, Balance Training, Functional Mobility Training, Transfer Training, ROM, Endurance Training, Gait Training, Stair training, Home Exercise Program, Safety Education & Training, Patient/Caregiver Education & Training  Safety Devices  Type of devices: Call light within reach, Chair alarm in place, Left in chair, Gait belt, Nurse notified  Restraints  Initially in place: No     Therapy Time   Individual Concurrent Group Co-treatment   Time In 1059         Time Out 1132         Minutes 33         Timed Code Treatment Minutes: Yancy 30, PT

## 2019-07-02 NOTE — DISCHARGE SUMMARY
Potassium 4.0 3.7 - 5.3 mmol/L    Chloride 103 98 - 107 mmol/L    CO2 23 20 - 31 mmol/L    Anion Gap 13 9 - 17 mmol/L    GFR Non-African American >60 >60 mL/min    GFR African American >60 >60 mL/min    GFR Comment          GFR Staging NOT REPORTED    Hepatic Function Panel    Collection Time: 07/01/19 11:12 AM   Result Value Ref Range    Alb 3.8 3.5 - 5.2 g/dL    Alkaline Phosphatase 99 35 - 104 U/L    ALT 18 5 - 33 U/L    AST 27 <32 U/L    Total Bilirubin 0.25 (L) 0.3 - 1.2 mg/dL    Bilirubin, Direct 0.09 <0.31 mg/dL    Bilirubin, Indirect 0.16 0.00 - 1.00 mg/dL    Total Protein 7.0 6.4 - 8.3 g/dL    Globulin NOT REPORTED 1.5 - 3.8 g/dL    Albumin/Globulin Ratio 1.2 1.0 - 2.5   Troponin    Collection Time: 07/01/19 11:12 AM   Result Value Ref Range    Troponin, High Sensitivity 9 0 - 14 ng/L    Troponin T NOT REPORTED <0.03 ng/mL    Troponin Interp NOT REPORTED    Protime-INR    Collection Time: 07/01/19 11:12 AM   Result Value Ref Range    Protime 10.0 9.4 - 12.6 sec    INR 1.0    APTT    Collection Time: 07/01/19 11:12 AM   Result Value Ref Range    PTT 22.9 21.3 - 31.3 sec   Urinalysis Reflex to Culture    Collection Time: 07/01/19 11:49 AM   Result Value Ref Range    Color, UA YELLOW YELLOW    Turbidity UA CLEAR CLEAR    Glucose, Ur NEGATIVE NEGATIVE    Bilirubin Urine NEGATIVE NEGATIVE    Ketones, Urine NEGATIVE NEGATIVE    Specific [place], UA 1.005     Urine Hgb NEGATIVE NEGATIVE    pH, UA 5.5     Protein, UA NEGATIVE NEGATIVE    Urobilinogen, Urine Normal Normal    Nitrite, Urine NEGATIVE NEGATIVE    Leukocyte Esterase, Urine NEGATIVE NEGATIVE    Urinalysis Comments       Microscopic exam not performed based on chemical results unless requested in original order. Urinalysis Comments          Urinalysis Comments       Utilizing a urinalysis as the only screening method to exclude a potential uropathogen can be unreliable in many patient populations.   Rapid screening tests are less sensitive than culture and if UTI is a clinical possibility, culture should be considered despite a negative urinalysis. CBC    Collection Time: 07/02/19  6:23 AM   Result Value Ref Range    WBC 12.3 (H) 3.5 - 11.0 k/uL    RBC 3.46 (L) 4.0 - 5.2 m/uL    Hemoglobin 10.6 (L) 12.0 - 16.0 g/dL    Hematocrit 30.7 (L) 36 - 46 %    MCV 88.6 80 - 100 fL    MCH 30.6 26 - 34 pg    MCHC 34.5 31 - 37 g/dL    RDW 13.5 12.5 - 15.4 %    Platelets 332 627 - 311 k/uL    MPV 7.3 6.0 - 12.0 fL    NRBC Automated NOT REPORTED per 100 WBC   Comprehensive Metabolic Panel w/ Reflex to MG    Collection Time: 07/02/19  6:23 AM   Result Value Ref Range    Glucose 132 (H) 70 - 99 mg/dL    BUN 14 6 - 20 mg/dL    CREATININE 0.69 0.50 - 0.90 mg/dL    Bun/Cre Ratio NOT REPORTED 9 - 20    Calcium 8.9 8.6 - 10.4 mg/dL    Sodium 141 135 - 144 mmol/L    Potassium 4.1 3.7 - 5.3 mmol/L    Chloride 105 98 - 107 mmol/L    CO2 25 20 - 31 mmol/L    Anion Gap 11 9 - 17 mmol/L    Alkaline Phosphatase 81 35 - 104 U/L    ALT 14 5 - 33 U/L    AST 19 <32 U/L    Total Bilirubin 0.25 (L) 0.3 - 1.2 mg/dL    Total Protein 6.1 (L) 6.4 - 8.3 g/dL    Alb 3.4 (L) 3.5 - 5.2 g/dL    Albumin/Globulin Ratio 1.3 1.0 - 2.5    GFR Non-African American >60 >60 mL/min    GFR African American >60 >60 mL/min    GFR Comment          GFR Staging NOT REPORTED      Radiology:    Xr Knee Left (1-2 Views)  Result Date: 7/1/2019  Recent postop changes left total knee arthroplasty.         Ct Head Wo Contrast  Result Date: 7/1/2019  No acute intracranial abnormality. Findings were discussed with Mamie Carlin at 11:44 am on 7/1/2019.         Xr Chest Portable  Result Date: 7/1/2019  No acute cardiopulmonary pathology.         Cta Head Neck W Contrast  Result Date: 7/1/2019  Unremarkable CTA of the head and neck.        Consultations:    Consults:     Final Specialist Recommendations/Findings:   IP CONSULT TO ORTHOPEDIC SURGERY      The patient was seen and examined on day of discharge and this

## 2019-07-02 NOTE — PROGRESS NOTES
Occupational Therapy  Facility/Department: [facility] PROGRESSIVE CARE  Daily Treatment Note  NAME: Elaine Natarajan  : 1970  MRN: [id number]    Date of Service: 2019    Discharge Recommendations:  No further occupational therapy recommended at discharge. OT Equipment Recommendations  Equipment Needed: (Pt reports plans to borrow a shower chair from a family member)    Assessment   Performance deficits / Impairments: Decreased functional mobility ; Decreased ADL status; Decreased endurance;Decreased balance;Decreased high-level IADLs;Decreased safe awareness  Prognosis: Good  Decision Making: Medium Complexity  Patient Education: OT Services/OT POC, Safety Awareness/Fall Prevention, Safety with Transfers/RW Mngt, TKA Precautions, ADL Techniques, Home Safety, good return  REQUIRES OT FOLLOW UP: Yes  Activity Tolerance  Activity Tolerance: Patient Tolerated treatment well  Safety Devices  Safety Devices in place: Yes  Type of devices: Left in chair(with PT present for continuation of care)  Restraints  Initially in place: No         Patient Diagnosis(es): The primary encounter diagnosis was Primary osteoarthritis of left knee. A diagnosis of Altered mental status, unspecified altered mental status type was also pertinent to this visit. has a past medical history of Asthma, Bowel perforation ([address] Diverticula of intestine, Fatty liver, GERD (gastroesophageal reflux disease), Hepatic steatosis, Hepatomegaly, Hyperlipidemia, Nausea & vomiting, Osteoarthritis, and Vasodepressor syncope.   has a past surgical history that includes Endometrial ablation; Hysterectomy; sigmoidectomy; ventral hernia repair (14); Cholecystectomy, laparoscopic (14); other surgical history (Right, 7/2/15); Colonoscopy; joint replacement (Right); colostomy; and pr office/outpt visit,procedure only (N/A, 2018).     Restrictions  Restrictions/Precautions  Restrictions/Precautions: Fall Risk, Femoral Block, Technique: Ambulating  Tub Transfers: Minimal assistance  Tub Transfers Comments: Pt performed 2x for transfer training. Bed mobility  Scooting: Stand by assistance  Comment: Pt up to chair at therapist arrival and retired in chair at therapist exit     Transfers  Sit to stand: Stand by assistance  Stand to sit: Stand by assistance  Transfer Comments:  With use of RW; pt educated in proper hand placement/safety techniques with use of RW during functional transfers and demo good return of these techniques throughout         Cognition  Overall Cognitive Status: [facility]  Times per week: 6-7x/wk  Times per day: (1-2x daily)  Current Treatment Recommendations: Balance Training, Functional Mobility Training, Endurance Training, Safety Education & Training, Patient/Caregiver Education & Training, Home Management Training, Equipment Evaluation, Education, & procurement, Self-Care / ADL      Goals  Short term goals  Time Frame for Short term goals: Pt will, by discharge:  Short term goal 1: perform functional transfers/functional mobility with mod IND using RW  Short term goal 2: independently demo safety awareness during ADLs and functional transfers/functional mobility  Short term goal 3: perform ADL tasks with setup and use of AE/DME PRN  Short term goal 4: demo 10+ minutes standing tolerance with use of RW for increased participation in ADLs       Therapy Time   Individual Concurrent Group Co-treatment   Time In 1325         Time Out 1350         Minutes 25         Timed Code Treatment Minutes: [address] [facility], OTR/L

## 2019-07-02 NOTE — DISCHARGE INSTR - COC
Continuity of Care Form    Patient Name: Nilsa Trevino   :  1970  MRN:  [id number]    Admit date:  2019  Discharge date:  ***    Code Status Order: Full Code   Advance Directives:   Advance Care Flowsheet Documentation     Date/Time Healthcare Directive Type of Healthcare Directive Copy in [address] Agent's Name Healthcare Agent's Phone Number    [phone]  No, patient does not have an advance directive for healthcare treatment -- -- -- -- --          Admitting Physician:  Melissa Saavedra MD  PCP: Clark Wetzel, APRN - CNP    Discharging Nurse: [facility] Unit/Room#: 945/136-44  Discharging Unit Phone Number: ***    Emergency Contact:   Extended Emergency Contact Information  Primary Emergency Contact: Marilee Villegas  Address: [address] [address]  Home Phone: [phone]  Relation: Parent  Secondary Emergency Contact: Parker Ojeda  Address: [address] [place] [address].  Home Phone: [phone]  Work Phone: [phone]  Relation: Spouse    Past Surgical History:  Past Surgical History:   Procedure Laterality Date    CHOLECYSTECTOMY, LAPAROSCOPIC  14    COLONOSCOPY      COLOSTOMY      AND REVERSAL after bowel perforation    ENDOMETRIAL ABLATION      HYSTERECTOMY      JOINT REPLACEMENT Right     PARTIAL knee    OTHER SURGICAL HISTORY Right 7/2/15    exploration radial laceration    CA OFFICE/OUTPT VISIT,PROCEDURE ONLY N/A 2018    NECK ABSCESS I & D POSTERIOR performed by Luisa Goodman MD at [address] [place] [address]      Sigmoid Colectomy and reversal    VENTRAL HERNIA REPAIR  14    open with mesh       Immunization History:   Immunization History   Administered Date(s) Administered    Tdap (Boostrix, Adacel) 2015       Active Problems:  Patient Active Problem List   Diagnosis Code    Status post laparoscopic cholecystectomy Z90.49    Status post repair of ventral hernia Z98.890,

## 2019-07-02 NOTE — PROGRESS NOTES
POD#1, patient was seen and examined, doing well, VSS, patient states there is leaking from ACB cath site, recheck the cath site, clean, dressing changed and re taped it. Her pain is well controlled, pain scale 2/10. No anesthesia complications.

## 2019-07-03 ENCOUNTER — NURSE TRIAGE (OUTPATIENT)
Dept: OTHER | Facility: CLINIC | Age: 49
End: 2019-07-03

## 2019-07-06 ENCOUNTER — NURSE TRIAGE (OUTPATIENT)
Dept: OTHER | Facility: CLINIC | Age: 49
End: 2019-07-06

## 2019-07-07 ENCOUNTER — HOSPITAL ENCOUNTER (EMERGENCY)
Age: 49
Discharge: HOME | End: 2019-07-07
Attending: SPECIALIST
Payer: COMMERCIAL

## 2019-07-07 VITALS
BODY MASS INDEX: 35.68 KG/M2 | SYSTOLIC BLOOD PRESSURE: 147 MMHG | RESPIRATION RATE: 16 BRPM | HEIGHT: 66 IN | OXYGEN SATURATION: 100 % | HEART RATE: 69 BPM | WEIGHT: 222 LBS | DIASTOLIC BLOOD PRESSURE: 70 MMHG | TEMPERATURE: 98.2 F

## 2019-07-07 DIAGNOSIS — E86.0 DEHYDRATION WITH HYPONATREMIA: Primary | ICD-10-CM

## 2019-07-07 DIAGNOSIS — E87.1 DEHYDRATION WITH HYPONATREMIA: Primary | ICD-10-CM

## 2019-07-07 LAB
-: ABNORMAL
ABSOLUTE EOS #: 0.1 K/UL (ref 0–0.4)
ABSOLUTE IMMATURE GRANULOCYTE: ABNORMAL K/UL (ref 0–0.3)
ABSOLUTE LYMPH #: 0.7 K/UL (ref 1–4.8)
ABSOLUTE MONO #: 0.5 K/UL (ref 0.1–1.2)
ALBUMIN SERPL-MCNC: 4 G/DL (ref 3.5–5.2)
ALBUMIN/GLOBULIN RATIO: 1.1 (ref 1–2.5)
ALP BLD-CCNC: 118 U/L (ref 35–104)
ALT SERPL-CCNC: 29 U/L (ref 5–33)
AMORPHOUS: ABNORMAL
ANION GAP SERPL CALCULATED.3IONS-SCNC: 12 MMOL/L (ref 9–17)
AST SERPL-CCNC: 44 U/L
BACTERIA: ABNORMAL
BASOPHILS # BLD: 0 % (ref 0–2)
BASOPHILS ABSOLUTE: 0 K/UL (ref 0–0.2)
BILIRUB SERPL-MCNC: 0.55 MG/DL (ref 0.3–1.2)
BILIRUBIN DIRECT: 0.17 MG/DL
BILIRUBIN URINE: NEGATIVE
BILIRUBIN, INDIRECT: 0.38 MG/DL (ref 0–1)
BUN BLDV-MCNC: 12 MG/DL (ref 6–20)
BUN/CREAT BLD: ABNORMAL (ref 9–20)
CALCIUM SERPL-MCNC: 9.9 MG/DL (ref 8.6–10.4)
CASTS UA: ABNORMAL /LPF
CHLORIDE BLD-SCNC: 97 MMOL/L (ref 98–107)
CO2: 25 MMOL/L (ref 20–31)
COLOR: YELLOW
COMMENT UA: ABNORMAL
CREAT SERPL-MCNC: 0.62 MG/DL (ref 0.5–0.9)
CRYSTALS, UA: ABNORMAL /HPF
DIFFERENTIAL TYPE: ABNORMAL
EOSINOPHILS RELATIVE PERCENT: 2 % (ref 1–4)
EPITHELIAL CELLS UA: ABNORMAL /HPF
GFR AFRICAN AMERICAN: >60 ML/MIN
GFR NON-AFRICAN AMERICAN: >60 ML/MIN
GFR SERPL CREATININE-BSD FRML MDRD: ABNORMAL ML/MIN/{1.73_M2}
GFR SERPL CREATININE-BSD FRML MDRD: ABNORMAL ML/MIN/{1.73_M2}
GLOBULIN: ABNORMAL G/DL (ref 1.5–3.8)
GLUCOSE BLD-MCNC: 119 MG/DL (ref 70–99)
GLUCOSE URINE: NEGATIVE
HCT VFR BLD CALC: 36.8 % (ref 36–46)
HEMOGLOBIN: 12.7 G/DL (ref 12–16)
IMMATURE GRANULOCYTES: ABNORMAL %
KETONES, URINE: NEGATIVE
LACTIC ACID: 1.8 MMOL/L (ref 0.5–2.2)
LEUKOCYTE ESTERASE, URINE: ABNORMAL
LIPASE: 19 U/L (ref 13–60)
LYMPHOCYTES # BLD: 12 % (ref 24–44)
MCH RBC QN AUTO: 30.5 PG (ref 26–34)
MCHC RBC AUTO-ENTMCNC: 34.6 G/DL (ref 31–37)
MCV RBC AUTO: 88.1 FL (ref 80–100)
MONOCYTES # BLD: 8 % (ref 2–11)
MUCUS: ABNORMAL
NITRITE, URINE: NEGATIVE
NRBC AUTOMATED: ABNORMAL PER 100 WBC
OTHER OBSERVATIONS UA: ABNORMAL
PDW BLD-RTO: 13.4 % (ref 12.5–15.4)
PH UA: 7.5
PLATELET # BLD: 231 K/UL (ref 140–450)
PLATELET ESTIMATE: ABNORMAL
PMV BLD AUTO: 6.7 FL (ref 6–12)
POTASSIUM SERPL-SCNC: 4.1 MMOL/L (ref 3.7–5.3)
PROTEIN UA: NEGATIVE
RBC # BLD: 4.17 M/UL (ref 4–5.2)
RBC # BLD: ABNORMAL 10*6/UL
RBC UA: ABNORMAL /HPF
RENAL EPITHELIAL, UA: ABNORMAL /HPF
SEG NEUTROPHILS: 78 % (ref 36–66)
SEGMENTED NEUTROPHILS ABSOLUTE COUNT: 4.3 K/UL (ref 1.8–7.7)
SODIUM BLD-SCNC: 134 MMOL/L (ref 135–144)
SPECIFIC GRAVITY UA: 1.01
TOTAL PROTEIN: 7.6 G/DL (ref 6.4–8.3)
TRICHOMONAS: ABNORMAL
TROPONIN INTERP: NORMAL
TROPONIN T: NORMAL NG/ML
TROPONIN, HIGH SENSITIVITY: 9 NG/L (ref 0–14)
TURBIDITY: ABNORMAL
URINE HGB: NEGATIVE
UROBILINOGEN, URINE: NORMAL
WBC # BLD: 5.5 K/UL (ref 3.5–11)
WBC # BLD: ABNORMAL 10*3/UL
WBC UA: ABNORMAL /HPF
YEAST: ABNORMAL

## 2019-07-07 PROCEDURE — 96376 TX/PRO/DX INJ SAME DRUG ADON: CPT

## 2019-07-07 PROCEDURE — 80076 HEPATIC FUNCTION PANEL: CPT

## 2019-07-07 PROCEDURE — 99283 EMERGENCY DEPT VISIT LOW MDM: CPT

## 2019-07-07 PROCEDURE — 96375 TX/PRO/DX INJ NEW DRUG ADDON: CPT

## 2019-07-07 PROCEDURE — 96374 THER/PROPH/DIAG INJ IV PUSH: CPT

## 2019-07-07 PROCEDURE — 87086 URINE CULTURE/COLONY COUNT: CPT

## 2019-07-07 PROCEDURE — 96361 HYDRATE IV INFUSION ADD-ON: CPT

## 2019-07-07 PROCEDURE — 93005 ELECTROCARDIOGRAM TRACING: CPT

## 2019-07-07 PROCEDURE — 83690 ASSAY OF LIPASE: CPT

## 2019-07-07 PROCEDURE — 6360000002 HC RX W HCPCS: Performed by: PHYSICIAN ASSISTANT

## 2019-07-07 PROCEDURE — 85025 COMPLETE CBC W/AUTO DIFF WBC: CPT

## 2019-07-07 PROCEDURE — 81001 URINALYSIS AUTO W/SCOPE: CPT

## 2019-07-07 PROCEDURE — 86403 PARTICLE AGGLUT ANTBDY SCRN: CPT

## 2019-07-07 PROCEDURE — 36415 COLL VENOUS BLD VENIPUNCTURE: CPT

## 2019-07-07 PROCEDURE — 80048 BASIC METABOLIC PNL TOTAL CA: CPT

## 2019-07-07 PROCEDURE — 83605 ASSAY OF LACTIC ACID: CPT

## 2019-07-07 PROCEDURE — 84484 ASSAY OF TROPONIN QUANT: CPT

## 2019-07-07 PROCEDURE — 2580000003 HC RX 258: Performed by: PHYSICIAN ASSISTANT

## 2019-07-07 RX ORDER — KETOROLAC TROMETHAMINE 15 MG/ML
15 INJECTION, SOLUTION INTRAMUSCULAR; INTRAVENOUS ONCE
Status: COMPLETED | OUTPATIENT
Start: 2019-07-07 | End: 2019-07-07

## 2019-07-07 RX ORDER — 0.9 % SODIUM CHLORIDE 0.9 %
1000 INTRAVENOUS SOLUTION INTRAVENOUS ONCE
Status: COMPLETED | OUTPATIENT
Start: 2019-07-07 | End: 2019-07-07

## 2019-07-07 RX ORDER — ONDANSETRON 2 MG/ML
4 INJECTION INTRAMUSCULAR; INTRAVENOUS ONCE
Status: COMPLETED | OUTPATIENT
Start: 2019-07-07 | End: 2019-07-07

## 2019-07-07 RX ORDER — LORAZEPAM 2 MG/ML
0.5 INJECTION INTRAMUSCULAR ONCE
Status: COMPLETED | OUTPATIENT
Start: 2019-07-07 | End: 2019-07-07

## 2019-07-07 RX ORDER — PROMETHAZINE HYDROCHLORIDE 25 MG/1
12.5 SUPPOSITORY RECTAL EVERY 6 HOURS PRN
Qty: 20 SUPPOSITORY | Refills: 0 | Status: SHIPPED | OUTPATIENT
Start: 2019-07-07 | End: 2020-02-26

## 2019-07-07 RX ADMIN — SODIUM CHLORIDE 1000 ML: 9 INJECTION, SOLUTION INTRAVENOUS at 15:19

## 2019-07-07 RX ADMIN — LORAZEPAM 0.5 MG: 2 INJECTION INTRAMUSCULAR; INTRAVENOUS at 14:52

## 2019-07-07 RX ADMIN — ONDANSETRON 4 MG: 2 INJECTION INTRAMUSCULAR; INTRAVENOUS at 14:00

## 2019-07-07 RX ADMIN — KETOROLAC TROMETHAMINE 15 MG: 15 INJECTION, SOLUTION INTRAMUSCULAR; INTRAVENOUS at 16:43

## 2019-07-07 RX ADMIN — KETOROLAC TROMETHAMINE 15 MG: 15 INJECTION, SOLUTION INTRAMUSCULAR; INTRAVENOUS at 14:01

## 2019-07-07 RX ADMIN — SODIUM CHLORIDE 1000 ML: 9 INJECTION, SOLUTION INTRAVENOUS at 14:00

## 2019-07-07 RX ADMIN — ONDANSETRON 4 MG: 2 INJECTION INTRAMUSCULAR; INTRAVENOUS at 14:49

## 2019-07-07 ASSESSMENT — PAIN DESCRIPTION - DESCRIPTORS
DESCRIPTORS: ACHING
DESCRIPTORS: ACHING

## 2019-07-07 ASSESSMENT — PAIN DESCRIPTION - PAIN TYPE
TYPE: ACUTE PAIN
TYPE: ACUTE PAIN

## 2019-07-07 ASSESSMENT — PAIN SCALES - GENERAL
PAINLEVEL_OUTOF10: 4
PAINLEVEL_OUTOF10: 5
PAINLEVEL_OUTOF10: 4
PAINLEVEL_OUTOF10: 4

## 2019-07-07 ASSESSMENT — ENCOUNTER SYMPTOMS
VOMITING: 1
ABDOMINAL PAIN: 0
EYE REDNESS: 0
EYE DISCHARGE: 0
NAUSEA: 1
SHORTNESS OF BREATH: 0
SORE THROAT: 0
COUGH: 0

## 2019-07-07 ASSESSMENT — PAIN DESCRIPTION - ORIENTATION
ORIENTATION: LEFT
ORIENTATION: LEFT

## 2019-07-07 ASSESSMENT — PAIN DESCRIPTION - LOCATION
LOCATION: LEG
LOCATION: LEG

## 2019-07-08 LAB
CULTURE: ABNORMAL
EKG ATRIAL RATE: 84 BPM
EKG P AXIS: 51 DEGREES
EKG P-R INTERVAL: 146 MS
EKG Q-T INTERVAL: 374 MS
EKG QRS DURATION: 86 MS
EKG QTC CALCULATION (BAZETT): 441 MS
EKG R AXIS: 4 DEGREES
EKG T AXIS: -29 DEGREES
EKG VENTRICULAR RATE: 84 BPM
Lab: ABNORMAL
SPECIMEN DESCRIPTION: ABNORMAL

## 2019-07-08 NOTE — ED PROVIDER NOTES
[facility] ED  [address] [place] [address]  Phone: [phone]  Fax: [phone]      Pt Name: Pao Hylton  MRN: [id number]  [place] 1970  Date of evaluation: 7/7/2019      CHIEF COMPLAINT       Chief Complaint   Patient presents with    Nausea    Knee Pain       HISTORY OF PRESENT ILLNESS   (Location, Quality, Severity, Duration, Timing, Context, ModifyingFactors, Associated Signs and Symptoms)     Pao Hylton is a 46 y.o. female who presents to the ER for duration of nausea and vomiting. Patient states that she had a left knee replacement performed at this facility on 7/1/19. The patient is having an adverse reaction to pain medication given postop. They had difficulty waking her up and she was transported to the ER for work-up. Patient was admitted to the hospital and discharged home the following day. She states that she has had continual nausea and vomiting. Patient denies diarrhea. She states that with attempting to, drink or take her medication she is extremely nauseous. She has vomited, but is now dry heaving. She reports decreased urine output. She states that she has significant pain in the left leg because she is not able to take her regular pain medication. She states that she does not like the way it makes her feel. She has been taking Tylenol but that does not seem to be helping. No documented fevers at home. She is scheduled to start therapy tomorrow. Is currently on aspirin for DVT prophylaxis. Patient rates her acute pain at 5/10. Nursing Notes were reviewed. REVIEW OF SYSTEMS     (2-9 systems for level 4, 10 or more for level 5)    Review of Systems   Constitutional: Negative for chills and fever. HENT: Negative for congestion, ear discharge, ear pain and sore throat. Eyes: Negative for discharge and redness. Respiratory: Negative for cough and shortness of breath. Cardiovascular: Negative for palpitations.
Renal function is normal and potassium is normal.  During the ED course, patient was given normal saline 2 L bolus and the Zofran 4 mg IV push twice, Toradol 15 mg IV push twice. She was also given Ativan orally. She is feeling much better and resting comfortably. She was ultimately discharged home on the Phenergan 25 mg suppository as needed for the nausea, plenty of oral fluids, follow-up with PCP, return if worse.        Oswaldo Segal MD  07/08/19 4926

## 2019-07-12 DIAGNOSIS — Z96.652 STATUS POST TOTAL LEFT KNEE REPLACEMENT: Primary | ICD-10-CM

## 2019-07-15 ENCOUNTER — OFFICE VISIT (OUTPATIENT)
Dept: ORTHOPEDIC SURGERY | Age: 49
End: 2019-07-15

## 2019-07-15 DIAGNOSIS — Z96.652 STATUS POST TOTAL LEFT KNEE REPLACEMENT: Primary | ICD-10-CM

## 2019-07-15 PROCEDURE — 99024 POSTOP FOLLOW-UP VISIT: CPT | Performed by: ORTHOPAEDIC SURGERY

## 2019-07-15 NOTE — PROGRESS NOTES
3 mg by mouth nightly as needed, Disp: , Rfl:     oxyCODONE-acetaminophen (PERCOCET) 5-325 MG per tablet, Take 1-2 tablets by mouth every 4 hours as needed for Pain (every 4-6 hours) for up to 40 days. , Disp: 40 tablet, Rfl: 0    lamoTRIgine (LAMICTAL) 25 MG tablet, Take 25 mg by mouth daily, Disp: , Rfl:     ezetimibe (ZETIA) 10 MG tablet, Take 10 mg by mouth daily, Disp: , Rfl:     alendronate (FOSAMAX) 70 MG tablet, Take 70 mg by mouth every 7 days, Disp: , Rfl:     lisinopril (PRINIVIL;ZESTRIL) 2.5 MG tablet, Take 1 tablet by mouth daily, Disp: 30 tablet, Rfl: 1    DULoxetine (CYMBALTA) 60 MG extended release capsule, TAKE ONE (1) CAPSULE BY MOUTH ONCE DAILY, Disp: 90 capsule, Rfl: 2    clonazePAM (KLONOPIN) 0.5 MG tablet, TAKE ONE (1) TABLET BY MOUTH TWO (2) TIMES DAILY, Disp: 60 tablet, Rfl: 1    pantoprazole (PROTONIX) 40 MG tablet, TAKE ONE TABLET BY MOUTH DAILY, Disp: 90 tablet, Rfl: 3    simvastatin (ZOCOR) 20 MG tablet, TAKE 1 TABLET DAILY AT BEDTIME, Disp: 90 tablet, Rfl: 3    PREMARIN 1.25 MG tablet, 1 tablet daily, Disp: , Rfl:     metoprolol (LOPRESSOR) 100 MG tablet, Take 100 mg by mouth daily. , Disp: , Rfl:     fludrocortisone (FLORINEF) 0.1 MG tablet, Take 0.1 mg by mouth daily. , Disp: , Rfl:     albuterol (PROVENTIL HFA;VENTOLIN HFA) 108 (90 BASE) MCG/ACT inhaler, Inhale 2 puffs into the lungs every 6 hours as needed for Wheezing or Shortness of Breath., Disp: , Rfl:     Allergies   Allergen Reactions    Bactrim [Sulfamethoxazole-Trimethoprim] Anaphylaxis    Dilaudid [Hydromorphone] Rash    Morphine And Related Shortness Of Breath, Nausea And Vomiting and Rash    Sulfa Antibiotics Hives, Swelling and Rash    Tramadol Itching and Rash    Keflex [Cephalexin] Itching     Patient also states a yeast infection developes    Penicillins Itching    Talwin [Pentazocine]     Codeine Nausea And Vomiting    Fentanyl Itching, Nausea And Vomiting and Other (See Comments)    Fluconazole Itching and Rash       Social History     Socioeconomic History    Marital status:      Spouse name: Not on file    Number of children: Not on file    Years of education: Not on file    Highest education level: Not on file   Occupational History    Not on file   Social Needs    Financial resource strain: Not on file    Food insecurity:     Worry: Not on file     Inability: Not on file    Transportation needs:     Medical: Not on file     Non-medical: Not on file   Tobacco Use    Smoking status: Never Smoker    Smokeless tobacco: Never Used   Substance and Sexual Activity    Alcohol use:  Yes     Alcohol/week: 2.0 standard drinks     Types: 2 Cans of beer per week     Frequency: Monthly or less     Comment: OCCASIONAL    Drug use: No    Sexual activity: Yes     Partners: Male   Lifestyle    Physical activity:     Days per week: Not on file     Minutes per session: Not on file    Stress: Not on file   Relationships    Social connections:     Talks on phone: Not on file     Gets together: Not on file     Attends Muslim service: Not on file     Active member of club or organization: Not on file     Attends meetings of clubs or organizations: Not on file     Relationship status: Not on file    Intimate partner violence:     Fear of current or ex partner: Not on file     Emotionally abused: Not on file     Physically abused: Not on file     Forced sexual activity: Not on file   Other Topics Concern    Not on file   Social History Narrative    Not on file       Past Medical History:   Diagnosis Date    Asthma     Bowel perforation ([facility] [address]     Diverticula of intestine     Fatty liver     GERD (gastroesophageal reflux disease)     Hepatic steatosis     Hepatomegaly     Hyperlipidemia     Nausea & vomiting     Osteoarthritis     Vasodepressor syncope      Past Surgical History:   Procedure Laterality Date    CHOLECYSTECTOMY, LAPAROSCOPIC  09/25/14    COLONOSCOPY      COLOSTOMY      AND

## 2019-12-30 ENCOUNTER — HOSPITAL ENCOUNTER (OUTPATIENT)
Dept: PULMONOLOGY | Age: 49
Discharge: HOME | End: 2019-12-30
Payer: COMMERCIAL

## 2019-12-30 PROCEDURE — 94060 EVALUATION OF WHEEZING: CPT

## 2019-12-30 PROCEDURE — 94761 N-INVAS EAR/PLS OXIMETRY MLT: CPT

## 2019-12-30 PROCEDURE — 94070 EVALUATION OF WHEEZING: CPT

## 2019-12-30 PROCEDURE — 6360000002 HC RX W HCPCS: Performed by: INTERNAL MEDICINE

## 2019-12-30 RX ORDER — ALBUTEROL SULFATE 2.5 MG/3ML
2.5 SOLUTION RESPIRATORY (INHALATION) ONCE
Status: COMPLETED | OUTPATIENT
Start: 2019-12-30 | End: 2019-12-30

## 2019-12-30 RX ADMIN — METHACHOLINE CHLORIDE 100 MG: 100 POWDER, FOR SOLUTION RESPIRATORY (INHALATION) at 11:55

## 2019-12-30 RX ADMIN — ALBUTEROL SULFATE 2.5 MG: 2.5 SOLUTION RESPIRATORY (INHALATION) at 12:32

## 2020-02-08 ENCOUNTER — HOSPITAL ENCOUNTER (OUTPATIENT)
Dept: WOMENS IMAGING | Age: 50
Discharge: HOME | End: 2020-02-10
Payer: COMMERCIAL

## 2020-02-08 PROCEDURE — 77063 BREAST TOMOSYNTHESIS BI: CPT

## 2020-02-10 ENCOUNTER — TELEPHONE (OUTPATIENT)
Dept: GASTROENTEROLOGY | Age: 50
End: 2020-02-10

## 2020-02-10 NOTE — TELEPHONE ENCOUNTER
Patient has questions regarding medication and labs . Prior to her appointment on 2/26/2020 . Please return her call [phone] .  Thank You

## 2020-02-10 NOTE — TELEPHONE ENCOUNTER
Spoke to pt. She wanted to know if she should have blood work prior to apt. Explained to pt that she has not been in office in 2 years and will need to have office apt with Dr Miguel Luo to discuss treatment plan. Pt verbalizes understanding.

## 2020-02-17 ENCOUNTER — HOSPITAL ENCOUNTER (OUTPATIENT)
Age: 50
Discharge: HOME | End: 2020-02-17
Payer: COMMERCIAL

## 2020-02-17 ENCOUNTER — HOSPITAL ENCOUNTER (OUTPATIENT)
Age: 50
Setting detail: SPECIMEN
Discharge: HOME | End: 2020-02-17
Payer: COMMERCIAL

## 2020-02-17 LAB
ALBUMIN SERPL-MCNC: 3.8 G/DL (ref 3.5–5.2)
ALBUMIN/GLOBULIN RATIO: ABNORMAL (ref 1–2.5)
ALP BLD-CCNC: 122 U/L (ref 35–104)
ALT SERPL-CCNC: 19 U/L (ref 5–33)
ANION GAP SERPL CALCULATED.3IONS-SCNC: 13 MMOL/L (ref 9–17)
AST SERPL-CCNC: 18 U/L
BILIRUB SERPL-MCNC: 0.26 MG/DL (ref 0.3–1.2)
BUN BLDV-MCNC: 15 MG/DL (ref 6–20)
BUN/CREAT BLD: ABNORMAL (ref 9–20)
CALCIUM SERPL-MCNC: 9.1 MG/DL (ref 8.6–10.4)
CHLORIDE BLD-SCNC: 103 MMOL/L (ref 98–107)
CO2: 26 MMOL/L (ref 20–31)
CREAT SERPL-MCNC: 0.61 MG/DL (ref 0.5–0.9)
ESTIMATED AVERAGE GLUCOSE: 126 MG/DL
FOLLICLE STIMULATING HORMONE: 19.8 U/L (ref 1.7–21.5)
GFR AFRICAN AMERICAN: >60 ML/MIN
GFR NON-AFRICAN AMERICAN: >60 ML/MIN
GFR SERPL CREATININE-BSD FRML MDRD: ABNORMAL ML/MIN/{1.73_M2}
GFR SERPL CREATININE-BSD FRML MDRD: ABNORMAL ML/MIN/{1.73_M2}
GLUCOSE BLD-MCNC: 103 MG/DL (ref 70–99)
HBA1C MFR BLD: 6 % (ref 4–6)
LH: 14.9 U/L (ref 1–95.6)
POTASSIUM SERPL-SCNC: 4.6 MMOL/L (ref 3.7–5.3)
SODIUM BLD-SCNC: 142 MMOL/L (ref 135–144)
THYROXINE, FREE: 0.81 NG/DL (ref 0.93–1.7)
TOTAL PROTEIN: 7.3 G/DL (ref 6.4–8.3)
TSH SERPL DL<=0.05 MIU/L-ACNC: 1.22 MIU/L (ref 0.3–5)
VITAMIN D 25-HYDROXY: 35.1 NG/ML (ref 30–100)

## 2020-02-17 PROCEDURE — 83002 ASSAY OF GONADOTROPIN (LH): CPT

## 2020-02-17 PROCEDURE — 83036 HEMOGLOBIN GLYCOSYLATED A1C: CPT

## 2020-02-17 PROCEDURE — 86800 THYROGLOBULIN ANTIBODY: CPT

## 2020-02-17 PROCEDURE — 84439 ASSAY OF FREE THYROXINE: CPT

## 2020-02-17 PROCEDURE — 86316 IMMUNOASSAY TUMOR OTHER: CPT

## 2020-02-17 PROCEDURE — 84443 ASSAY THYROID STIM HORMONE: CPT

## 2020-02-17 PROCEDURE — 83835 ASSAY OF METANEPHRINES: CPT

## 2020-02-17 PROCEDURE — 80053 COMPREHEN METABOLIC PANEL: CPT

## 2020-02-17 PROCEDURE — 86376 MICROSOMAL ANTIBODY EACH: CPT

## 2020-02-17 PROCEDURE — 82384 ASSAY THREE CATECHOLAMINES: CPT

## 2020-02-17 PROCEDURE — 82306 VITAMIN D 25 HYDROXY: CPT

## 2020-02-17 PROCEDURE — 36415 COLL VENOUS BLD VENIPUNCTURE: CPT

## 2020-02-17 PROCEDURE — 83001 ASSAY OF GONADOTROPIN (FSH): CPT

## 2020-02-17 PROCEDURE — 82533 TOTAL CORTISOL: CPT

## 2020-02-18 ENCOUNTER — HOSPITAL ENCOUNTER (OUTPATIENT)
Age: 50
Setting detail: SPECIMEN
Discharge: HOME | End: 2020-02-18
Payer: COMMERCIAL

## 2020-02-18 LAB
THYROGLOBULIN AB: <20 IU/ML (ref 0–40)
THYROID PEROXIDASE (TPO) AB: <10 IU/ML (ref 0–35)

## 2020-02-18 PROCEDURE — 82533 TOTAL CORTISOL: CPT

## 2020-02-19 ENCOUNTER — HOSPITAL ENCOUNTER (OUTPATIENT)
Age: 50
Setting detail: SPECIMEN
Discharge: HOME | End: 2020-02-19
Payer: COMMERCIAL

## 2020-02-19 PROCEDURE — 82533 TOTAL CORTISOL: CPT

## 2020-02-20 LAB
METANEPH/PLASMA INTERP: NORMAL
METANEPHRINE: <0.1 NMOL/L (ref 0–0.49)
NORMETANEPHRINE PLASMA: 0.27 NMOL/L (ref 0–0.89)

## 2020-02-21 LAB — CHROMOGRANIN A: 787 NG/ML (ref 0–160)

## 2020-02-21 ASSESSMENT — ENCOUNTER SYMPTOMS
SINUS PAIN: 0
STRIDOR: 0
RECTAL PAIN: 0
ANAL BLEEDING: 0
WHEEZING: 1
BACK PAIN: 1
FACIAL SWELLING: 0
BLOOD IN STOOL: 0
SINUS PRESSURE: 1
COUGH: 1
VOMITING: 0
SORE THROAT: 0
ABDOMINAL DISTENTION: 0
NAUSEA: 0
RHINORRHEA: 0
ABDOMINAL PAIN: 0
SHORTNESS OF BREATH: 1
ALLERGIC/IMMUNOLOGIC NEGATIVE: 1

## 2020-02-21 NOTE — PROGRESS NOTES
hematuria, vaginal bleeding and vaginal discharge. Musculoskeletal: Positive for arthralgias, back pain and myalgias. Negative for gait problem. Allergic/Immunologic: Negative. Negative for environmental allergies, food allergies and immunocompromised state. Neurological: Positive for dizziness, weakness and light-headedness. Negative for tremors, seizures, syncope, facial asymmetry, speech difficulty, numbness and headaches. Hematological: Negative. Psychiatric/Behavioral: Positive for sleep disturbance. The patient is nervous/anxious. Dr. Rom Mcallister, APRN - CNP our mutual patient Nikkie Mcghee was seen  for No diagnosis found. .    Past Medical, Family, and Social History reviewed and does contribute to the patient presenting condition. patient\"s PMH/PSH,SH,PSYCH hx, MEDs, ALLERGIES, and ROS was all reviewed and updated ion the appropriate sections    Objective:   Physical Exam  Vitals signs and nursing note reviewed. Constitutional:       Appearance: Normal appearance. Comments: overweight   HENT:      Head: Normocephalic and atraumatic. Eyes:      Conjunctiva/sclera: Conjunctivae normal.      Pupils: Pupils are equal, round, and reactive to light. Neck:      Musculoskeletal: Normal range of motion. Cardiovascular:      Rate and Rhythm: Normal rate and regular rhythm. Heart sounds: Normal heart sounds. Pulmonary:      Effort: Pulmonary effort is normal.      Breath sounds: Normal breath sounds. Abdominal:      General: Bowel sounds are normal. There is no distension. Palpations: Abdomen is soft. Tenderness: There is no abdominal tenderness. There is no guarding. Musculoskeletal: Normal range of motion. Skin:     General: Skin is warm and dry. Neurological:      General: No focal deficit present. Mental Status: She is alert and oriented to person, place, and time.    Psychiatric:         Mood and Affect: Mood normal.         Behavior: Behavior

## 2020-02-22 LAB
CORTISOL SALIVARY: 0.02 UG/DL
CORTISOL SALIVARY: 0.03 UG/DL
CORTISOL SALIVARY: 0.05 UG/DL

## 2020-02-25 ENCOUNTER — TELEPHONE (OUTPATIENT)
Dept: GASTROENTEROLOGY | Age: 50
End: 2020-02-25

## 2020-02-25 LAB
CATECHOLAMINE INTERPRETATION, PLASMA: NORMAL
EPINEPHRINE PLASMA: 10 PG/ML (ref 10–200)
NOREPINEPHRINE: 189 PG/ML (ref 80–520)

## 2020-02-26 ENCOUNTER — OFFICE VISIT (OUTPATIENT)
Dept: GASTROENTEROLOGY | Age: 50
End: 2020-02-26
Payer: COMMERCIAL

## 2020-02-26 ENCOUNTER — HOSPITAL ENCOUNTER (OUTPATIENT)
Dept: WOMENS IMAGING | Age: 50
End: 2020-02-26
Payer: COMMERCIAL

## 2020-02-26 ENCOUNTER — HOSPITAL ENCOUNTER (OUTPATIENT)
Dept: WOMENS IMAGING | Age: 50
Discharge: HOME | End: 2020-02-28
Payer: COMMERCIAL

## 2020-02-26 VITALS
WEIGHT: 237 LBS | DIASTOLIC BLOOD PRESSURE: 87 MMHG | BODY MASS INDEX: 38.25 KG/M2 | SYSTOLIC BLOOD PRESSURE: 139 MMHG | HEART RATE: 80 BPM | OXYGEN SATURATION: 97 %

## 2020-02-26 PROCEDURE — G8417 CALC BMI ABV UP PARAM F/U: HCPCS | Performed by: INTERNAL MEDICINE

## 2020-02-26 PROCEDURE — G8427 DOCREV CUR MEDS BY ELIG CLIN: HCPCS | Performed by: INTERNAL MEDICINE

## 2020-02-26 PROCEDURE — G8484 FLU IMMUNIZE NO ADMIN: HCPCS | Performed by: INTERNAL MEDICINE

## 2020-02-26 PROCEDURE — 99215 OFFICE O/P EST HI 40 MIN: CPT | Performed by: INTERNAL MEDICINE

## 2020-02-26 PROCEDURE — 3017F COLORECTAL CA SCREEN DOC REV: CPT | Performed by: INTERNAL MEDICINE

## 2020-02-26 PROCEDURE — 77065 DX MAMMO INCL CAD UNI: CPT

## 2020-02-26 PROCEDURE — 1036F TOBACCO NON-USER: CPT | Performed by: INTERNAL MEDICINE

## 2020-02-26 RX ORDER — FAMOTIDINE 20 MG/1
20 TABLET, FILM COATED ORAL 2 TIMES DAILY
Qty: 60 TABLET | Refills: 3 | Status: SHIPPED | OUTPATIENT
Start: 2020-02-26

## 2020-02-26 RX ORDER — FLUTICASONE FUROATE AND VILANTEROL 100; 25 UG/1; UG/1
POWDER RESPIRATORY (INHALATION)
COMMUNITY

## 2020-02-26 RX ORDER — SODIUM, POTASSIUM,MAG SULFATES 17.5-3.13G
SOLUTION, RECONSTITUTED, ORAL ORAL
Qty: 1 BOTTLE | Refills: 0 | Status: SHIPPED | OUTPATIENT
Start: 2020-02-26

## 2020-02-26 RX ORDER — METOPROLOL SUCCINATE 100 MG/1
100 TABLET, EXTENDED RELEASE ORAL
COMMUNITY
Start: 2017-11-03

## 2020-02-26 RX ORDER — HYDROXYZINE HYDROCHLORIDE 25 MG/1
TABLET, FILM COATED ORAL
COMMUNITY

## 2020-02-26 ASSESSMENT — ENCOUNTER SYMPTOMS
DIARRHEA: 1
TROUBLE SWALLOWING: 1
VOICE CHANGE: 1
CONSTIPATION: 1

## 2020-03-19 ENCOUNTER — TELEPHONE (OUTPATIENT)
Dept: GASTROENTEROLOGY | Age: 50
End: 2020-03-19

## 2020-03-19 NOTE — TELEPHONE ENCOUNTER
Spoke with patient and advised that her procedure at [address] [place] [address] on 3/27/20 would need to be rescheduled due to COVID-19. Patient will call to reschedule. Letter sent.

## 2020-04-23 ENCOUNTER — HOSPITAL ENCOUNTER (OUTPATIENT)
Age: 50
Discharge: HOME | End: 2020-04-23
Payer: COMMERCIAL

## 2020-04-23 PROCEDURE — 36415 COLL VENOUS BLD VENIPUNCTURE: CPT

## 2020-04-23 PROCEDURE — 86316 IMMUNOASSAY TUMOR OTHER: CPT

## 2020-04-24 ENCOUNTER — TELEPHONE (OUTPATIENT)
Dept: GASTROENTEROLOGY | Age: 50
End: 2020-04-24

## 2020-04-24 NOTE — TELEPHONE ENCOUNTER
Pt left vm on nurse line that she has worsening GERD, some coughing, throwing up and chest pain and Pepcid is not working. Please schedule VV with Dr Shady Durant or Chloé Burnett NP. Thanks!

## 2020-04-26 LAB — CHROMOGRANIN A: 89 NG/ML (ref 0–160)

## 2020-04-27 ENCOUNTER — APPOINTMENT (OUTPATIENT)
Dept: CT IMAGING | Age: 50
End: 2020-04-27
Payer: COMMERCIAL

## 2020-04-27 ENCOUNTER — VIRTUAL VISIT (OUTPATIENT)
Dept: GASTROENTEROLOGY | Age: 50
End: 2020-04-27
Payer: COMMERCIAL

## 2020-04-27 ENCOUNTER — NURSE TRIAGE (OUTPATIENT)
Dept: OTHER | Facility: CLINIC | Age: 50
End: 2020-04-27

## 2020-04-27 ENCOUNTER — HOSPITAL ENCOUNTER (EMERGENCY)
Age: 50
Discharge: HOME | End: 2020-04-27
Attending: EMERGENCY MEDICINE
Payer: COMMERCIAL

## 2020-04-27 VITALS
BODY MASS INDEX: 35.36 KG/M2 | WEIGHT: 220 LBS | HEIGHT: 66 IN | HEART RATE: 83 BPM | SYSTOLIC BLOOD PRESSURE: 129 MMHG | DIASTOLIC BLOOD PRESSURE: 54 MMHG | TEMPERATURE: 98.7 F | OXYGEN SATURATION: 100 % | RESPIRATION RATE: 18 BRPM

## 2020-04-27 LAB
ABSOLUTE EOS #: 0.2 K/UL (ref 0–0.4)
ABSOLUTE IMMATURE GRANULOCYTE: ABNORMAL K/UL (ref 0–0.3)
ABSOLUTE LYMPH #: 2 K/UL (ref 1–4.8)
ABSOLUTE MONO #: 0.7 K/UL (ref 0.1–1.3)
ALBUMIN SERPL-MCNC: 4.1 G/DL (ref 3.5–5.2)
ALBUMIN/GLOBULIN RATIO: ABNORMAL (ref 1–2.5)
ALP BLD-CCNC: 128 U/L (ref 35–104)
ALT SERPL-CCNC: 28 U/L (ref 5–33)
ANION GAP SERPL CALCULATED.3IONS-SCNC: 14 MMOL/L (ref 9–17)
AST SERPL-CCNC: 27 U/L
BASOPHILS # BLD: 1 % (ref 0–2)
BASOPHILS ABSOLUTE: 0.1 K/UL (ref 0–0.2)
BILIRUB SERPL-MCNC: 0.44 MG/DL (ref 0.3–1.2)
BUN BLDV-MCNC: 13 MG/DL (ref 6–20)
BUN/CREAT BLD: ABNORMAL (ref 9–20)
CALCIUM SERPL-MCNC: 10 MG/DL (ref 8.6–10.4)
CHLORIDE BLD-SCNC: 101 MMOL/L (ref 98–107)
CO2: 24 MMOL/L (ref 20–31)
CREAT SERPL-MCNC: 0.6 MG/DL (ref 0.5–0.9)
DIFFERENTIAL TYPE: ABNORMAL
EOSINOPHILS RELATIVE PERCENT: 1 % (ref 0–4)
GFR AFRICAN AMERICAN: >60 ML/MIN
GFR NON-AFRICAN AMERICAN: >60 ML/MIN
GFR SERPL CREATININE-BSD FRML MDRD: ABNORMAL ML/MIN/{1.73_M2}
GFR SERPL CREATININE-BSD FRML MDRD: ABNORMAL ML/MIN/{1.73_M2}
GLUCOSE BLD-MCNC: 101 MG/DL (ref 70–99)
HCT VFR BLD CALC: 43 % (ref 36–46)
HEMOGLOBIN: 14.1 G/DL (ref 12–16)
IMMATURE GRANULOCYTES: ABNORMAL %
LIPASE: 23 U/L (ref 13–60)
LYMPHOCYTES # BLD: 14 % (ref 24–44)
MCH RBC QN AUTO: 28.4 PG (ref 26–34)
MCHC RBC AUTO-ENTMCNC: 32.8 G/DL (ref 31–37)
MCV RBC AUTO: 86.8 FL (ref 80–100)
MONOCYTES # BLD: 5 % (ref 1–7)
NRBC AUTOMATED: ABNORMAL PER 100 WBC
PDW BLD-RTO: 14 % (ref 11.5–14.9)
PLATELET # BLD: 259 K/UL (ref 150–450)
PLATELET ESTIMATE: ABNORMAL
PMV BLD AUTO: 7.2 FL (ref 6–12)
POTASSIUM SERPL-SCNC: 4 MMOL/L (ref 3.7–5.3)
RBC # BLD: 4.96 M/UL (ref 4–5.2)
RBC # BLD: ABNORMAL 10*6/UL
SEG NEUTROPHILS: 79 % (ref 36–66)
SEGMENTED NEUTROPHILS ABSOLUTE COUNT: 11.1 K/UL (ref 1.3–9.1)
SODIUM BLD-SCNC: 139 MMOL/L (ref 135–144)
THYROXINE, FREE: 1.06 NG/DL (ref 0.93–1.7)
TOTAL PROTEIN: 8.1 G/DL (ref 6.4–8.3)
TROPONIN INTERP: NORMAL
TROPONIN INTERP: NORMAL
TROPONIN T: NORMAL NG/ML
TROPONIN T: NORMAL NG/ML
TROPONIN, HIGH SENSITIVITY: 9 NG/L (ref 0–14)
TROPONIN, HIGH SENSITIVITY: 9 NG/L (ref 0–14)
TSH SERPL DL<=0.05 MIU/L-ACNC: 2.62 MIU/L (ref 0.3–5)
WBC # BLD: 14.1 K/UL (ref 3.5–11)
WBC # BLD: ABNORMAL 10*3/UL

## 2020-04-27 PROCEDURE — 6360000004 HC RX CONTRAST MEDICATION: Performed by: EMERGENCY MEDICINE

## 2020-04-27 PROCEDURE — 96375 TX/PRO/DX INJ NEW DRUG ADDON: CPT

## 2020-04-27 PROCEDURE — 93005 ELECTROCARDIOGRAM TRACING: CPT | Performed by: EMERGENCY MEDICINE

## 2020-04-27 PROCEDURE — 2580000003 HC RX 258: Performed by: EMERGENCY MEDICINE

## 2020-04-27 PROCEDURE — 85025 COMPLETE CBC W/AUTO DIFF WBC: CPT

## 2020-04-27 PROCEDURE — 99284 EMERGENCY DEPT VISIT MOD MDM: CPT

## 2020-04-27 PROCEDURE — 84439 ASSAY OF FREE THYROXINE: CPT

## 2020-04-27 PROCEDURE — 84443 ASSAY THYROID STIM HORMONE: CPT

## 2020-04-27 PROCEDURE — 84484 ASSAY OF TROPONIN QUANT: CPT

## 2020-04-27 PROCEDURE — 74177 CT ABD & PELVIS W/CONTRAST: CPT

## 2020-04-27 PROCEDURE — 99442 PR PHYS/QHP TELEPHONE EVALUATION 11-20 MIN: CPT | Performed by: INTERNAL MEDICINE

## 2020-04-27 PROCEDURE — 6370000000 HC RX 637 (ALT 250 FOR IP): Performed by: EMERGENCY MEDICINE

## 2020-04-27 PROCEDURE — 80053 COMPREHEN METABOLIC PANEL: CPT

## 2020-04-27 PROCEDURE — C9113 INJ PANTOPRAZOLE SODIUM, VIA: HCPCS | Performed by: EMERGENCY MEDICINE

## 2020-04-27 PROCEDURE — 83690 ASSAY OF LIPASE: CPT

## 2020-04-27 PROCEDURE — 96374 THER/PROPH/DIAG INJ IV PUSH: CPT

## 2020-04-27 PROCEDURE — 6360000002 HC RX W HCPCS: Performed by: EMERGENCY MEDICINE

## 2020-04-27 PROCEDURE — 36415 COLL VENOUS BLD VENIPUNCTURE: CPT

## 2020-04-27 RX ORDER — MAGNESIUM HYDROXIDE/ALUMINUM HYDROXICE/SIMETHICONE 120; 1200; 1200 MG/30ML; MG/30ML; MG/30ML
30 SUSPENSION ORAL
Status: COMPLETED | OUTPATIENT
Start: 2020-04-27 | End: 2020-04-27

## 2020-04-27 RX ORDER — 0.9 % SODIUM CHLORIDE 0.9 %
80 INTRAVENOUS SOLUTION INTRAVENOUS ONCE
Status: COMPLETED | OUTPATIENT
Start: 2020-04-27 | End: 2020-04-27

## 2020-04-27 RX ORDER — SODIUM CHLORIDE 0.9 % (FLUSH) 0.9 %
10 SYRINGE (ML) INJECTION PRN
Status: DISCONTINUED | OUTPATIENT
Start: 2020-04-27 | End: 2020-04-28 | Stop reason: HOSPADM

## 2020-04-27 RX ORDER — ONDANSETRON 2 MG/ML
4 INJECTION INTRAMUSCULAR; INTRAVENOUS ONCE
Status: COMPLETED | OUTPATIENT
Start: 2020-04-27 | End: 2020-04-27

## 2020-04-27 RX ORDER — 0.9 % SODIUM CHLORIDE 0.9 %
1000 INTRAVENOUS SOLUTION INTRAVENOUS ONCE
Status: COMPLETED | OUTPATIENT
Start: 2020-04-27 | End: 2020-04-27

## 2020-04-27 RX ORDER — SODIUM CHLORIDE 9 MG/ML
10 INJECTION INTRAVENOUS ONCE
Status: COMPLETED | OUTPATIENT
Start: 2020-04-27 | End: 2020-04-27

## 2020-04-27 RX ORDER — PANTOPRAZOLE SODIUM 40 MG/10ML
40 INJECTION, POWDER, LYOPHILIZED, FOR SOLUTION INTRAVENOUS ONCE
Status: COMPLETED | OUTPATIENT
Start: 2020-04-27 | End: 2020-04-27

## 2020-04-27 RX ADMIN — PANTOPRAZOLE SODIUM 40 MG: 40 INJECTION, POWDER, FOR SOLUTION INTRAVENOUS at 20:15

## 2020-04-27 RX ADMIN — ALUMINUM HYDROXIDE, MAGNESIUM HYDROXIDE, AND SIMETHICONE 30 ML: 200; 200; 20 SUSPENSION ORAL at 20:15

## 2020-04-27 RX ADMIN — SODIUM CHLORIDE 80 ML: 9 INJECTION, SOLUTION INTRAVENOUS at 21:16

## 2020-04-27 RX ADMIN — SODIUM CHLORIDE 1000 ML: 9 INJECTION, SOLUTION INTRAVENOUS at 20:15

## 2020-04-27 RX ADMIN — ONDANSETRON 4 MG: 2 INJECTION INTRAMUSCULAR; INTRAVENOUS at 20:15

## 2020-04-27 RX ADMIN — IOVERSOL 75 ML: 741 INJECTION INTRA-ARTERIAL; INTRAVENOUS at 21:16

## 2020-04-27 RX ADMIN — SODIUM CHLORIDE 10 ML: 9 INJECTION INTRAMUSCULAR; INTRAVENOUS; SUBCUTANEOUS at 20:15

## 2020-04-27 RX ADMIN — Medication 10 ML: at 21:16

## 2020-04-27 ASSESSMENT — PAIN SCALES - WONG BAKER: WONGBAKER_NUMERICALRESPONSE: 0

## 2020-04-27 ASSESSMENT — PAIN DESCRIPTION - LOCATION: LOCATION: ABDOMEN

## 2020-04-27 ASSESSMENT — ENCOUNTER SYMPTOMS
VOMITING: 1
COLOR CHANGE: 0
BLOOD IN STOOL: 0
CHEST TIGHTNESS: 0
SORE THROAT: 0
EYE PAIN: 0
ABDOMINAL PAIN: 1
DIARRHEA: 0
SHORTNESS OF BREATH: 0
CONSTIPATION: 0
FACIAL SWELLING: 0
NAUSEA: 1
EYE REDNESS: 0
EYE DISCHARGE: 0
TROUBLE SWALLOWING: 0
WHEEZING: 0
SINUS PRESSURE: 0
COUGH: 0
BACK PAIN: 0
RHINORRHEA: 0

## 2020-04-27 ASSESSMENT — PAIN SCALES - GENERAL
PAINLEVEL_OUTOF10: 10
PAINLEVEL_OUTOF10: 0

## 2020-04-27 ASSESSMENT — PAIN DESCRIPTION - FREQUENCY: FREQUENCY: CONTINUOUS

## 2020-04-27 ASSESSMENT — PAIN DESCRIPTION - DESCRIPTORS: DESCRIPTORS: BURNING;CONSTANT

## 2020-04-27 ASSESSMENT — PAIN DESCRIPTION - ORIENTATION: ORIENTATION: UPPER

## 2020-04-27 ASSESSMENT — PAIN DESCRIPTION - PAIN TYPE: TYPE: ACUTE PAIN

## 2020-04-27 NOTE — ED PROVIDER NOTES
gastritis. We will treat her symptomatically and check some labs since this is different than her normal reflux. Patient is allergic to pretty much all pain medications therefore I will give her some Maalox to try to help along with Protonix. EMERGENCY DEPARTMENT COURSE:   Vitals:    Vitals:    04/27/20 1944 04/27/20 2017 04/27/20 2020 04/27/20 2030   BP:  125/65 (!) 129/54 (!) 129/54   Pulse:       Resp:       Temp:       TempSrc:       SpO2:       Weight: 220 lb (99.8 kg)      Height: 5' 6\" (1.676 m)          The patient was given the following medications while in the emergency department:  Orders Placed This Encounter   Medications    0.9 % sodium chloride bolus    ondansetron (ZOFRAN) injection 4 mg    AND Linked Order Group     pantoprazole (PROTONIX) injection 40 mg     sodium chloride (PF) 0.9 % injection 10 mL    aluminum & magnesium hydroxide-simethicone (MAALOX) 200-200-20 MG/5ML suspension 30 mL       -------------------------  8:35 PM EDT  Patient will get a CT scan and finished the lab work. Patient be signed out to the oncoming provider. CONSULTS:  None    PROCEDURES:  None    FINAL IMPRESSION    No diagnosis found. DISPOSITION/PLAN   DISPOSITION        PATIENT REFERREDTO:  No follow-up provider specified.     DISCHARGEMEDICATIONS:  New Prescriptions    No medications on file       (Please note that portions of this note were completed with a voice recognition program.  Efforts were made to edit thedictations but occasionally words are mis-transcribed.)    Heraclio Tony MD  Attending Emergency Physician                        Heraclio Tony MD  04/27/20 2035

## 2020-04-27 NOTE — PROGRESS NOTES
billable if this call serves to triage the patient into an appointment for the relevant concern      [address]

## 2020-04-27 NOTE — TELEPHONE ENCOUNTER
Reason for Disposition   [1] SEVERE pain (e.g., excruciating) AND [2] present > 1 hour    Answer Assessment - Initial Assessment Questions  1. LOCATION: \"Where does it hurt? \"       Upper abdomen right and center but when stands up can feel the pain in her groin  2. RADIATION: \"Does the pain shoot anywhere else? \" (e.g., chest, back)      Shoots into groin when stands up and chest burning/heaviness  3. ONSET: \"When did the pain begin? \" (e.g., minutes, hours or days ago)       Thursday  4. SUDDEN: \"Gradual or sudden onset? \"      Gradual   5. PATTERN \"Does the pain come and go, or is it constant? \"     - If constant: \"Is it getting better, staying the same, or worsening? \"       (Note: Constant means the pain never goes away completely; most serious pain is constant and it progresses)      - If intermittent: \"How long does it last?\" \"Do you have pain now? \"      (Note: Intermittent means the pain goes away completely between bouts)     Constant  6. SEVERITY: \"How bad is the pain? \"  (e.g., Scale 1-10; mild, moderate, or severe)     - MILD (1-3): doesn't interfere with normal activities, abdomen soft and not tender to touch      - MODERATE (4-7): interferes with normal activities or awakens from sleep, tender to touch      - SEVERE (8-10): excruciating pain, doubled over, unable to do any normal activities        8/10  7. RECURRENT SYMPTOM: \"Have you ever had this type of abdominal pain before? \" If so, ask: \"When was the last time? \" and \"What happened that time? \"       No  8. AGGRAVATING FACTORS: \"Does anything seem to cause this pain? \" (e.g., foods, stress, alcohol)      No  9. CARDIAC SYMPTOMS: \"Do you have any of the following symptoms: chest pain, difficulty breathing, sweating, nausea? \"      Nausea  10. OTHER SYMPTOMS: \"Do you have any other symptoms? \" (e.g., fever, vomiting, diarrhea)        no  11. PREGNANCY: \"Is there any chance you are pregnant? \" \"When was your last menstrual period? \"        no    Protocols used: ABDOMINAL PAIN - UPPER-ADULT-AH    Patient called in via the [address] to report symptoms of severe abdominal pain. Pt also reports sob due to pain, nausea, heartburn, and feeling bloated. Pt states that the pain is an 8/10 and constant but getting worse. Patient informed of disposition. Care advice as documented. Instructed patient to call back with worsening symptoms. Patient verbalized understanding and denies any further questions/concerns. Please do not respond to the triage nurse through this encounter. Any subsequent communication should be directly with the patient.

## 2020-04-28 ENCOUNTER — CARE COORDINATION (OUTPATIENT)
Dept: CARE COORDINATION | Age: 50
End: 2020-04-28

## 2020-04-28 ENCOUNTER — TELEPHONE (OUTPATIENT)
Dept: ORTHOPEDIC SURGERY | Age: 50
End: 2020-04-28

## 2020-04-28 ENCOUNTER — TELEPHONE (OUTPATIENT)
Dept: GASTROENTEROLOGY | Age: 50
End: 2020-04-28

## 2020-04-28 LAB
EKG ATRIAL RATE: 71 BPM
EKG P AXIS: 41 DEGREES
EKG P-R INTERVAL: 150 MS
EKG Q-T INTERVAL: 388 MS
EKG QRS DURATION: 98 MS
EKG QTC CALCULATION (BAZETT): 421 MS
EKG R AXIS: -6 DEGREES
EKG T AXIS: 11 DEGREES
EKG VENTRICULAR RATE: 71 BPM

## 2020-04-28 PROCEDURE — 93010 ELECTROCARDIOGRAM REPORT: CPT | Performed by: INTERNAL MEDICINE

## 2020-04-28 NOTE — TELEPHONE ENCOUNTER
Spoke with patient regarding the Get Well Loop program. Explained to her that the link was sent to her e-mail. Patient was agreeable.

## 2020-04-28 NOTE — CARE COORDINATION
Patient contacted regarding recent discharge and COVID-19 risk   Care Transition Nurse/ Ambulatory Care Manager contacted the patient by telephone to perform post discharge assessment. Verified name and  with patient as identifiers. Patient has following risk factors of: asthma. CTN/ACM reviewed discharge instructions, medical action plan and red flags related to discharge diagnosis. Reviewed and educated them on any new and changed medications related to discharge diagnosis. Advised obtaining a 90-day supply of all daily and as-needed medications. Education provided regarding infection prevention, and signs and symptoms of COVID-19 and when to seek medical attention with patient who verbalized understanding. Discussed exposure protocols and quarantine from [address] you at higher risk for severe illness  and given an opportunity for questions and concerns. The patient agrees to contact the COVID-19 hotline [phone] or PCP office for questions related to their healthcare. CTN/ACM provided contact information for future reference. From CDC: Are you at higher risk for severe illness?  Wash your hands often.  Avoid close contact (6 feet, which is about two arm lengths) with people who are sick.  Put distance between yourself and other people if COVID-19 is spreading in your community.  Clean and disinfect frequently touched surfaces.  Avoid all cruise travel and non-essential air travel.  Call your healthcare professional if you have concerns about COVID-19 and your underlying condition or if you are sick. For more information on steps you can take to protect yourself, see CDC's How to Protect Yourself    Pt will be further monitored by COVID Loop Team based on severity of symptoms and risk factors. Patient's abdominal pain is much better today, still has nausea, will be calling GI today to make a follow up from ED. Patient states, \"I know I need a scope\".

## 2020-04-29 ENCOUNTER — HOSPITAL ENCOUNTER (OUTPATIENT)
Age: 50
Discharge: HOME | End: 2020-04-29
Payer: COMMERCIAL

## 2020-04-29 PROCEDURE — U0002 COVID-19 LAB TEST NON-CDC: HCPCS

## 2020-04-30 LAB
SARS-COV-2, PCR: NOT DETECTED
SARS-COV-2, RAPID: NORMAL
SARS-COV-2: NORMAL
SOURCE: NORMAL

## 2020-05-01 ENCOUNTER — ANESTHESIA (OUTPATIENT)
Dept: ENDOSCOPY | Age: 50
End: 2020-05-01
Payer: COMMERCIAL

## 2020-05-01 ENCOUNTER — ANESTHESIA EVENT (OUTPATIENT)
Dept: ENDOSCOPY | Age: 50
End: 2020-05-01
Payer: COMMERCIAL

## 2020-05-01 ENCOUNTER — HOSPITAL ENCOUNTER (OUTPATIENT)
Age: 50
Setting detail: OUTPATIENT SURGERY
Discharge: HOME | End: 2020-05-01
Attending: INTERNAL MEDICINE | Admitting: INTERNAL MEDICINE
Payer: COMMERCIAL

## 2020-05-01 VITALS
DIASTOLIC BLOOD PRESSURE: 81 MMHG | RESPIRATION RATE: 24 BRPM | OXYGEN SATURATION: 96 % | SYSTOLIC BLOOD PRESSURE: 161 MMHG

## 2020-05-01 VITALS
BODY MASS INDEX: 35.36 KG/M2 | HEIGHT: 66 IN | WEIGHT: 220 LBS | RESPIRATION RATE: 16 BRPM | OXYGEN SATURATION: 99 % | SYSTOLIC BLOOD PRESSURE: 133 MMHG | TEMPERATURE: 97.8 F | DIASTOLIC BLOOD PRESSURE: 76 MMHG | HEART RATE: 68 BPM

## 2020-05-01 PROCEDURE — 7100000031 HC ASPR PHASE II RECOVERY - ADDTL 15 MIN: Performed by: INTERNAL MEDICINE

## 2020-05-01 PROCEDURE — 6360000002 HC RX W HCPCS: Performed by: NURSE ANESTHETIST, CERTIFIED REGISTERED

## 2020-05-01 PROCEDURE — 7100000011 HC PHASE II RECOVERY - ADDTL 15 MIN: Performed by: INTERNAL MEDICINE

## 2020-05-01 PROCEDURE — 7100000030 HC ASPR PHASE II RECOVERY - FIRST 15 MIN: Performed by: INTERNAL MEDICINE

## 2020-05-01 PROCEDURE — 7100000010 HC PHASE II RECOVERY - FIRST 15 MIN: Performed by: INTERNAL MEDICINE

## 2020-05-01 PROCEDURE — 2709999900 HC NON-CHARGEABLE SUPPLY: Performed by: INTERNAL MEDICINE

## 2020-05-01 PROCEDURE — 3609012400 HC EGD TRANSORAL BIOPSY SINGLE/MULTIPLE: Performed by: INTERNAL MEDICINE

## 2020-05-01 PROCEDURE — 7100000001 HC PACU RECOVERY - ADDTL 15 MIN: Performed by: INTERNAL MEDICINE

## 2020-05-01 PROCEDURE — 3700000000 HC ANESTHESIA ATTENDED CARE: Performed by: INTERNAL MEDICINE

## 2020-05-01 PROCEDURE — 3700000001 HC ADD 15 MINUTES (ANESTHESIA): Performed by: INTERNAL MEDICINE

## 2020-05-01 PROCEDURE — 7100000000 HC PACU RECOVERY - FIRST 15 MIN: Performed by: INTERNAL MEDICINE

## 2020-05-01 PROCEDURE — 43239 EGD BIOPSY SINGLE/MULTIPLE: CPT | Performed by: INTERNAL MEDICINE

## 2020-05-01 PROCEDURE — 88305 TISSUE EXAM BY PATHOLOGIST: CPT

## 2020-05-01 PROCEDURE — 2580000003 HC RX 258: Performed by: ANESTHESIOLOGY

## 2020-05-01 PROCEDURE — 2500000003 HC RX 250 WO HCPCS: Performed by: NURSE ANESTHETIST, CERTIFIED REGISTERED

## 2020-05-01 RX ORDER — PROPOFOL 10 MG/ML
INJECTION, EMULSION INTRAVENOUS PRN
Status: DISCONTINUED | OUTPATIENT
Start: 2020-05-01 | End: 2020-05-01 | Stop reason: SDUPTHER

## 2020-05-01 RX ORDER — PANTOPRAZOLE SODIUM 20 MG/1
20 TABLET, DELAYED RELEASE ORAL DAILY
COMMUNITY

## 2020-05-01 RX ORDER — HYDRALAZINE HYDROCHLORIDE 20 MG/ML
5 INJECTION INTRAMUSCULAR; INTRAVENOUS EVERY 10 MIN PRN
Status: DISCONTINUED | OUTPATIENT
Start: 2020-05-01 | End: 2020-05-01 | Stop reason: HOSPADM

## 2020-05-01 RX ORDER — METOCLOPRAMIDE HYDROCHLORIDE 5 MG/ML
10 INJECTION INTRAMUSCULAR; INTRAVENOUS
Status: DISCONTINUED | OUTPATIENT
Start: 2020-05-01 | End: 2020-05-01 | Stop reason: HOSPADM

## 2020-05-01 RX ORDER — ONDANSETRON 2 MG/ML
4 INJECTION INTRAMUSCULAR; INTRAVENOUS
Status: DISCONTINUED | OUTPATIENT
Start: 2020-05-01 | End: 2020-05-01 | Stop reason: HOSPADM

## 2020-05-01 RX ORDER — DIPHENHYDRAMINE HYDROCHLORIDE 50 MG/ML
12.5 INJECTION INTRAMUSCULAR; INTRAVENOUS
Status: DISCONTINUED | OUTPATIENT
Start: 2020-05-01 | End: 2020-05-01 | Stop reason: HOSPADM

## 2020-05-01 RX ORDER — LABETALOL 20 MG/4 ML (5 MG/ML) INTRAVENOUS SYRINGE
5 EVERY 10 MIN PRN
Status: DISCONTINUED | OUTPATIENT
Start: 2020-05-01 | End: 2020-05-01 | Stop reason: HOSPADM

## 2020-05-01 RX ORDER — SODIUM CHLORIDE, SODIUM LACTATE, POTASSIUM CHLORIDE, CALCIUM CHLORIDE 600; 310; 30; 20 MG/100ML; MG/100ML; MG/100ML; MG/100ML
INJECTION, SOLUTION INTRAVENOUS CONTINUOUS
Status: DISCONTINUED | OUTPATIENT
Start: 2020-05-01 | End: 2020-05-01 | Stop reason: HOSPADM

## 2020-05-01 RX ORDER — LIDOCAINE HYDROCHLORIDE 20 MG/ML
INJECTION, SOLUTION INFILTRATION; PERINEURAL PRN
Status: DISCONTINUED | OUTPATIENT
Start: 2020-05-01 | End: 2020-05-01 | Stop reason: SDUPTHER

## 2020-05-01 RX ADMIN — LIDOCAINE HYDROCHLORIDE 100 MG: 20 INJECTION, SOLUTION INFILTRATION; PERINEURAL at 13:33

## 2020-05-01 RX ADMIN — SODIUM CHLORIDE, POTASSIUM CHLORIDE, SODIUM LACTATE AND CALCIUM CHLORIDE: 600; 310; 30; 20 INJECTION, SOLUTION INTRAVENOUS at 11:16

## 2020-05-01 RX ADMIN — PROPOFOL 100 MG: 10 INJECTION, EMULSION INTRAVENOUS at 13:40

## 2020-05-01 RX ADMIN — PROPOFOL 100 MG: 10 INJECTION, EMULSION INTRAVENOUS at 13:36

## 2020-05-01 RX ADMIN — PROPOFOL 200 MG: 10 INJECTION, EMULSION INTRAVENOUS at 13:33

## 2020-05-01 RX ADMIN — PROPOFOL 100 MG: 10 INJECTION, EMULSION INTRAVENOUS at 13:38

## 2020-05-01 ASSESSMENT — PULMONARY FUNCTION TESTS
PIF_VALUE: 1

## 2020-05-01 ASSESSMENT — PAIN DESCRIPTION - DESCRIPTORS: DESCRIPTORS: CRAMPING

## 2020-05-01 ASSESSMENT — ENCOUNTER SYMPTOMS: STRIDOR: 0

## 2020-05-01 ASSESSMENT — PAIN - FUNCTIONAL ASSESSMENT: PAIN_FUNCTIONAL_ASSESSMENT: 0-10

## 2020-05-01 ASSESSMENT — PAIN SCALES - GENERAL
PAINLEVEL_OUTOF10: 0
PAINLEVEL_OUTOF10: 0

## 2020-05-01 ASSESSMENT — PAIN DESCRIPTION - PAIN TYPE: TYPE: ACUTE PAIN

## 2020-05-01 ASSESSMENT — PAIN DESCRIPTION - LOCATION: LOCATION: ABDOMEN

## 2020-05-01 NOTE — OP NOTE
ESOPHAGOGASTRODUODENOSCOPY   ( EGD )  DATE OF PROCEDURE: 5/1/2020     SURGEON: Juan Alberto Franklin MD    ASSISTANT: None    PREOPERATIVE DIAGNOSIS: Severe epigastric discomfort and heartburns. Patient could not get better with symptomatic treatment. Procedure performed to evaluate upper GI lesions    POSTOPERATIVE DIAGNOSIS: No lesions seen that can account patient's symptoms    Small, multiple, benign looking gastric polyps from which biopsies taken    OPERATION: Upper GI endoscopy with Biopsy    ANESTHESIA: MAC    ESTIMATED BLOOD LOSS: None    COMPLICATIONS: None. SPECIMENS:  Was Obtained: Gastric polyps, random biopsies from the body and antrum of the stomach to evaluate H. pylori, random biopsies from the body of the esophagus to evaluate microscopic esophagitis    HISTORY: The patient is a 44y.o. year old female with history of above preop diagnosis. I recommended esophagogastroduodenoscopy with possible biopsy and I explained the risk, benefits, expected outcome, and alternatives to the procedure. Risks included but are not limited to bleeding, infection, respiratory distress, hypotension, and perforation of the esophagus, stomach, or duodenum. Patient understands and is in agreement. PROCEDURE: The patient was given IV conscious sedation. The patient's SPO2 remained above 90% throughout the procedure. Cetacaine spray given. Patient placed in left lateral position. Olympus  videogastroscope was inserted orally under vision into the esophagus without difficulty and advanced into the stomach then through the pylorus up to the second part of duodenum. Findings:    Retropharyngeal area was grossly normal appearing    Esophagus: normal.  No signs of peptic esophagitis, Velez's mucosa, signs of eosinophilic esophagitis seen. From a pulmonary junction is at about 24 cm. May have small 2 cm long sliding hiatal hernia.     Multiple random biopsies taken from the body of the esophagus to evaluate microscopic esophagitis    Stomach:    Fundus and Cardia Examined in Retroflexed View: normal    Body: abnormal:, 3 to 4 mm, benign looking polyps present. Couple of these polyps were excised with biopsy forceps for histology    Antrum: normal  Multiple random biopsies taken from the body and antrum for H. pylori status         duodenum:     Descending: normal    Bulb: normal    While withdrawing the scope the above findings were verified and the scope was removed. The patient has tolerated the procedure without unusual events. Recommendations/Plan:   1. F/U Biopsies  2. F/U In Office as instructed  3.  Discussed with the family                   Electronically signed by Jose Spain MD  on 5/1/2020 at 1:46 PM

## 2020-05-01 NOTE — H&P
asthma 07/01/2019    Sleep apnea 07/01/2019    Delayed emergence from general anesthesia 07/01/2019    Altered mental status     Cellulitis 06/15/2018    Hepatic steatosis 12/18/2017    Poison ivy dermatitis 05/30/2017    Strep sore throat 03/09/2016    Anxiety 03/09/2016    History of diverticulitis 02/18/2016    Elevated liver enzymes 02/18/2016    Hepatomegaly     Abscess and cellulitis 01/11/2016    Mixed hyperlipidemia 12/08/2015    Hand laceration 07/02/2015    Osteoarthritis of knee, unilateral 01/12/2015    Status post right partial knee replacement 01/12/2015    Status post laparoscopic cholecystectomy 09/26/2014    Status post repair of ventral hernia 09/26/2014    Leukocytosis 09/26/2014    Anemia 09/26/2014           LIZ Farley - CNP on 5/1/2020 at 11:29 AM

## 2020-05-01 NOTE — ANESTHESIA PRE PROCEDURE
Department of Anesthesiology  Preprocedure Note       Name:  John Rivero   Age:  44 y.o.  :  1970                                          MRN:  [id number]         Date:  2020      Surgeon: Jake Fierro):  Marko Shah MD    Procedure: EGD (N/A Esophagus)    Medications prior to admission:   Prior to Admission medications    Medication Sig Start Date End Date Taking? Authorizing Provider   pantoprazole (PROTONIX) 20 MG tablet Take 20 mg by mouth daily   Yes Historical Provider, MD   fluticasone-vilanterol (BREO ELLIPTA) 100-25 MCG/INH AEPB inhaler 1 puff   Yes Historical Provider, MD   hydrOXYzine (ATARAX) 25 MG tablet 1 tablet as needed in the evening   Yes Historical Provider, MD   metFORMIN HCl  MG/5ML SRER 1 tablet with evening meal 20  Yes Historical Provider, MD   VORTIoxetine HBr (TRINTELLIX) 20 MG TABS tablet 1 tablet   Yes Historical Provider, MD   metoprolol succinate (TOPROL XL) 100 MG extended release tablet Take 100 mg by mouth 11/3/17  Yes Historical Provider, MD   famotidine (PEPCID) 20 MG tablet Take 1 tablet by mouth 2 times daily 20  Yes Amgomez Wright APRN - NP   rOPINIRole (REQUIP) 0.25 MG tablet Take by mouth nightly   Yes Historical Provider, MD   ezetimibe (ZETIA) 10 MG tablet Take 10 mg by mouth daily   Yes Historical Provider, MD   alendronate (FOSAMAX) 70 MG tablet Take 70 mg by mouth every 7 days   Yes Historical Provider, MD   clonazePAM (KLONOPIN) 0.5 MG tablet TAKE ONE (1) TABLET BY MOUTH TWO (2) TIMES DAILY 17  Yes Balwinder Duong MD   PREMARIN 1.25 MG tablet 1 tablet daily 16  Yes Historical Provider, MD   fludrocortisone (FLORINEF) 0.1 MG tablet Take 0.1 mg by mouth daily. Yes Historical Provider, MD   Na Sulfate-K Sulfate-Mg Sulf 17.5-3.13-1.6 GM/177ML SOLN Use as directed in your patient instructions.  20   Marko Shah MD   aspirin 81 MG chewable tablet Take 1 tablet by mouth 2 times daily 19   Joslyn Gibbs MD Mild intermittent asthma J45.20    Sleep apnea G47.30    Delayed emergence from general anesthesia T88.59XA    Altered mental status R41.82    Acute encephalopathy G93.40       Past Medical History:        Diagnosis Date    Asthma     Bowel perforation (HCC)     Diverticula of intestine     Fatty liver     GERD (gastroesophageal reflux disease)     Hepatic steatosis     Hepatomegaly     Hyperlipidemia     Nausea & vomiting     Osteoarthritis     Vasodepressor syncope        Past Surgical History:        Procedure Laterality Date    CHOLECYSTECTOMY, LAPAROSCOPIC  09/25/14    COLONOSCOPY      COLOSTOMY      AND REVERSAL after bowel perforation    ENDOMETRIAL ABLATION      HYSTERECTOMY      JOINT REPLACEMENT Right     PARTIAL knee    OTHER SURGICAL HISTORY Right 7/2/15    exploration radial laceration    [place] OFFICE/OUTPT VISIT,PROCEDURE ONLY N/A 6/18/2018    NECK ABSCESS I & D POSTERIOR performed by Freddy Padilla MD at [address] SIGMOIDECTOMY      Sigmoid Colectomy and reversal    TOTAL KNEE ARTHROPLASTY Left 7/1/2019    KNEE TOTAL ARTHROPLASTY - LEFT WITH BIOMET AND GPS C ARM NEEDED (X-RAY NOTIFIED) performed by Raudel Evans MD at [address]  09/25/14    open with mesh       Social History:    Social History     Tobacco Use    Smoking status: Never Smoker    Smokeless tobacco: Never Used   Substance Use Topics    Alcohol use:  Yes     Alcohol/week: 2.0 standard drinks     Types: 2 Cans of beer per week     Frequency: Monthly or less     Comment: OCCASIONAL                                Counseling given: Not Answered      Vital Signs (Current):   Vitals:    05/01/20 1054   BP: 132/79   Pulse: 69   Resp: 18   Temp: 97.7 °F (36.5 °C)   TempSrc: Oral   SpO2: 98%   Weight: 220 lb (99.8 kg)   Height: 5' 6\" (1.676 m)                                              BP Readings from Last 3 Encounters:   05/01/20 132/79   04/27/20 (!) 129/54   02/26/20 139/87

## 2020-05-05 LAB — SURGICAL PATHOLOGY REPORT: NORMAL

## 2020-05-14 ENCOUNTER — TELEPHONE (OUTPATIENT)
Dept: GASTROENTEROLOGY | Age: 50
End: 2020-05-14

## 2020-05-18 ENCOUNTER — TELEPHONE (OUTPATIENT)
Dept: GASTROENTEROLOGY | Age: 50
End: 2020-05-18

## 2020-05-21 ENCOUNTER — VIRTUAL VISIT (OUTPATIENT)
Dept: GASTROENTEROLOGY | Age: 50
End: 2020-05-21
Payer: COMMERCIAL

## 2020-05-21 PROCEDURE — 99213 OFFICE O/P EST LOW 20 MIN: CPT | Performed by: INTERNAL MEDICINE

## 2020-05-21 PROCEDURE — 3017F COLORECTAL CA SCREEN DOC REV: CPT | Performed by: INTERNAL MEDICINE

## 2020-05-21 PROCEDURE — G8427 DOCREV CUR MEDS BY ELIG CLIN: HCPCS | Performed by: INTERNAL MEDICINE

## 2020-05-21 ASSESSMENT — ENCOUNTER SYMPTOMS
BLOOD IN STOOL: 0
ANAL BLEEDING: 0
BACK PAIN: 1
SINUS PAIN: 0
VOMITING: 0
ALLERGIC/IMMUNOLOGIC NEGATIVE: 1
ABDOMINAL PAIN: 0
SHORTNESS OF BREATH: 1
SORE THROAT: 0
NAUSEA: 0
TROUBLE SWALLOWING: 1
ABDOMINAL DISTENTION: 0
RECTAL PAIN: 0
VOICE CHANGE: 1
WHEEZING: 1
CONSTIPATION: 1
COUGH: 1
STRIDOR: 0
RHINORRHEA: 0
SINUS PRESSURE: 1
DIARRHEA: 1
FACIAL SWELLING: 0

## 2020-05-21 NOTE — PROGRESS NOTES
2020    TELEHEALTH EVALUATION -- Audio/Visual (During PHITB-80 public health emergency)    HPI:    Bard Mcardle (:  1970) has requested an audio/video evaluation for the following concern(s): This is a audio/video visit. Review of Systems   Constitutional: Positive for fatigue and unexpected weight change. Negative for activity change, appetite change, chills, diaphoresis and fever. HENT: Positive for congestion, postnasal drip, sinus pressure, trouble swallowing and voice change. Negative for dental problem, drooling, ear discharge, ear pain, facial swelling, hearing loss, mouth sores, nosebleeds, rhinorrhea, sinus pain, sneezing, sore throat and tinnitus. Eyes: Positive for visual disturbance (glasses). Respiratory: Positive for cough, shortness of breath and wheezing. Negative for stridor. Being tx for URI   Cardiovascular: Positive for chest pain, palpitations (off and on) and leg swelling. Gastrointestinal: Positive for constipation and diarrhea. Negative for abdominal distention, abdominal pain, anal bleeding, blood in stool, nausea, rectal pain and vomiting. Endocrine: Negative. Negative for polyphagia and polyuria. Genitourinary: Negative. Negative for difficulty urinating, dysuria, frequency, hematuria, vaginal bleeding and vaginal discharge. Musculoskeletal: Positive for arthralgias, back pain and myalgias. Negative for gait problem. Allergic/Immunologic: Negative. Negative for environmental allergies, food allergies and immunocompromised state. Neurological: Positive for dizziness, weakness and light-headedness. Negative for tremors, seizures, syncope, facial asymmetry, speech difficulty, numbness and headaches. Hematological: Negative. Psychiatric/Behavioral: Positive for sleep disturbance. The patient is nervous/anxious. Prior to Visit Medications    Medication Sig Taking?  Authorizing Provider   pantoprazole (PROTONIX) 20 MG tablet Take ASSESSMENT/PLAN:  There are no diagnoses linked to this encounter. No follow-ups on file. Is known to have severe epigastric discomfort and heartburns. Was not getting better with medical therapy. Subsequently patient had EGD done on 5/1/2020. This exam did not reveal significant pathology in the esophagus. However she was found to have small polyps in the body of the stomach that was removed and histology revealed fundic gland polyps. At present patient is on Protonix 20 mg a day and with this medicine she is doing well. Dyspepsia, heartburns completely resolved. Tolerating diet well. Bowel movements satisfactory. She is supposed to have screening colonoscopy and discussed with the patient regarding this and she wishes to have this done in the next few weeks. Reason for Referral:   No referring provider defined for this encounter. Chief Complaint   Patient presents with    Follow-up     EGD. Patient states she feels much better. Pain in her stomach is gone    Gastroesophageal Reflux     Acid is still bothersome. She takes Pepcid in AM and Pantoprazole and still wakes her at night. Mylanta helped real well in the hospital.       No diagnosis found. HISTORY OF PRESENT ILLNESS: Alex Cristobal is a 44 y.o. female with a past history remarkable for epigastric discomfort and heartburns. , referred for evaluation of   Chief Complaint   Patient presents with    Follow-up     EGD. Patient states she feels much better. Pain in her stomach is gone    Gastroesophageal Reflux     Acid is still bothersome. She takes Pepcid in AM and Pantoprazole and still wakes her at night. Mylanta helped real well in the hospital.   .     Past Medical,Family, and Social History reviewed and does contribute to the patient presentingcondition. Patient's PMH/PSH,SH,PSYCH Hx, MEDs, ALLERGIES, and ROS were all reviewed and updated in the appropriate sections.     PAST MEDICAL HISTORY:  Past Medical History:   Diagnosis Date    Asthma     Bowel perforation (HCC)     Diverticula of intestine     Fatty liver     GERD (gastroesophageal reflux disease)     Hepatic steatosis     Hepatomegaly     Hyperlipidemia     Nausea & vomiting     Osteoarthritis     Vasodepressor syncope        Past Surgical History:   Procedure Laterality Date    CHOLECYSTECTOMY, LAPAROSCOPIC  09/25/14    COLONOSCOPY      COLOSTOMY      AND REVERSAL after bowel perforation    ENDOMETRIAL ABLATION      HYSTERECTOMY      JOINT REPLACEMENT Right     PARTIAL knee    OTHER SURGICAL HISTORY Right 7/2/15    exploration radial laceration    NE OFFICE/OUTPT VISIT,PROCEDURE ONLY N/A 6/18/2018    NECK ABSCESS I & D POSTERIOR performed by Nicole Schmid MD at [address] [facility] SIGMOIDECTOMY      Sigmoid Colectomy and reversal    TOTAL KNEE ARTHROPLASTY Left 7/1/2019    KNEE TOTAL ARTHROPLASTY - LEFT WITH BIOMET AND GPS C ARM NEEDED (X-RAY NOTIFIED) performed by Dasie Kocher, MD at [address] 5/1/2020    EGD BIOPSY performed by [facility] [address], [place] at [address] [address] [address]  09/25/14    open with mesh       CURRENT MEDICATIONS:    Current Outpatient Medications:     pantoprazole (PROTONIX) 20 MG tablet, Take 20 mg by mouth daily, Disp: , Rfl:     fluticasone-vilanterol (BREO ELLIPTA) 100-25 MCG/INH AEPB inhaler, 1 puff, Disp: , Rfl:     hydrOXYzine (ATARAX) 25 MG tablet, 1 tablet as needed in the evening, Disp: , Rfl:     metFORMIN HCl  MG/5ML SRER, 1 tablet with evening meal, Disp: , Rfl:     VORTIoxetine HBr (TRINTELLIX) 20 MG TABS tablet, 1 tablet, Disp: , Rfl:     metoprolol succinate (TOPROL XL) 100 MG extended release tablet, Take 100 mg by mouth, Disp: , Rfl:     famotidine (PEPCID) 20 MG tablet, Take 1 tablet by mouth 2 times daily, Disp: 60 tablet, Rfl: 3    Na Sulfate-K Sulfate-Mg Sulf 17.5-3.13-1.6 GM/177ML SOLN, Use as directed in your patient instructions. , Disp: 1 Bottle, Rfl: 0    aspirin 81 MG chewable tablet, Take 1 tablet by mouth 2 times daily, Disp: 30 tablet, Rfl: 3    rOPINIRole (REQUIP) 0.25 MG tablet, Take by mouth nightly, Disp: , Rfl:     ezetimibe (ZETIA) 10 MG tablet, Take 10 mg by mouth daily, Disp: , Rfl:     alendronate (FOSAMAX) 70 MG tablet, Take 70 mg by mouth every 7 days, Disp: , Rfl:     clonazePAM (KLONOPIN) 0.5 MG tablet, TAKE ONE (1) TABLET BY MOUTH TWO (2) TIMES DAILY, Disp: 60 tablet, Rfl: 1    simvastatin (ZOCOR) 20 MG tablet, TAKE 1 TABLET DAILY AT BEDTIME, Disp: 90 tablet, Rfl: 3    PREMARIN 1.25 MG tablet, 1 tablet daily, Disp: , Rfl:     fludrocortisone (FLORINEF) 0.1 MG tablet, Take 0.1 mg by mouth daily. , Disp: , Rfl:     albuterol (PROVENTIL HFA;VENTOLIN HFA) 108 (90 BASE) MCG/ACT inhaler, Inhale 2 puffs into the lungs every 6 hours as needed for Wheezing or Shortness of Breath., Disp: , Rfl:     ALLERGIES:   Allergies   Allergen Reactions    Bactrim [Sulfamethoxazole-Trimethoprim] Anaphylaxis    Dilaudid [Hydromorphone] Rash    Morphine And Related Shortness Of Breath, Nausea And Vomiting and Rash    Sulfa Antibiotics Hives, Swelling and Rash    Tramadol Itching and Rash    Keflex [Cephalexin] Itching     Patient also states a yeast infection developes    Penicillins Itching    Talwin [Pentazocine]     Codeine Nausea And Vomiting    Fentanyl Itching, Nausea And Vomiting and Other (See Comments)    Fluconazole Itching and Rash       FAMILY HISTORY:       Problem Relation Age of Onset    Diabetes Mother     High Blood Pressure Mother     High Cholesterol Mother     Osteoporosis Mother     COPD Father     Coronary Art Dis Father     Cancer Father         lung    Breast Cancer Maternal Grandmother          SOCIAL HISTORY:   Social History     Socioeconomic History    Marital status:      Spouse name: Not on file    Number of children: Not on file    Years of education: Not on file  Highest education level: Not on file   Occupational History    Not on file   Social Needs    Financial resource strain: Not on file    Food insecurity     Worry: Not on file     Inability: Not on file    Transportation needs     Medical: Not on file     Non-medical: Not on file   Tobacco Use    Smoking status: Never Smoker    Smokeless tobacco: Never Used   Substance and Sexual Activity    Alcohol use:  Yes     Alcohol/week: 2.0 standard drinks     Types: 2 Cans of beer per week     Frequency: Monthly or less     Comment: OCCASIONAL    Drug use: No    Sexual activity: Yes     Partners: Male   Lifestyle    Physical activity     Days per week: Not on file     Minutes per session: Not on file    Stress: Not on file   Relationships    Social connections     Talks on phone: Not on file     Gets together: Not on file     Attends Roman [employer] service: Not on file     Active member of club or organization: Not on file     Attends meetings of clubs or organizations: Not on file     Relationship status: Not on file    Intimate partner violence     Fear of current or ex partner: Not on file     Emotionally abused: Not on file     Physically abused: Not on file     Forced sexual activity: Not on file   Other Topics Concern    Not on file   Social History Narrative    Not on file         LABORATORY DATA: Reviewed  Lab Results   Component Value Date    WBC 14.1 (H) 04/27/2020    HGB 14.1 04/27/2020    HCT 43.0 04/27/2020    MCV 86.8 04/27/2020     04/27/2020     04/27/2020    K 4.0 04/27/2020     04/27/2020    CO2 24 04/27/2020    BUN 13 04/27/2020    CREATININE 0.60 04/27/2020    LABPROT 8.2 01/21/2013    LABALBU 4.1 04/27/2020    BILITOT 0.44 04/27/2020    ALKPHOS 128 (H) 04/27/2020    AST 27 04/27/2020    ALT 28 04/27/2020    INR 1.0 07/01/2019         Lab Results   Component Value Date    RBC 4.96 04/27/2020    HGB 14.1 04/27/2020    MCV 86.8 04/27/2020    MCH 28.4 04/27/2020    MCHC 32.8

## 2020-05-22 ENCOUNTER — TELEPHONE (OUTPATIENT)
Dept: GASTROENTEROLOGY | Age: 50
End: 2020-05-22

## 2020-06-22 NOTE — TELEPHONE ENCOUNTER
Contacted Lauren to schedule colonoscopy. She is agreeable to scheduling at this time and is now scheduled at [facility] on Monday 6/29/20 @ 1100AM, [facility] scheduled 6/25/20 @ 940AM. She has bowel prep at home, updated instructions e-mailed.

## 2020-06-23 ENCOUNTER — HOSPITAL ENCOUNTER (OUTPATIENT)
Age: 50
Discharge: HOME | End: 2020-06-23
Payer: COMMERCIAL

## 2020-06-23 LAB
ALBUMIN SERPL-MCNC: 3.8 G/DL (ref 3.5–5.2)
ALBUMIN/GLOBULIN RATIO: ABNORMAL (ref 1–2.5)
ALP BLD-CCNC: 103 U/L (ref 35–104)
ALT SERPL-CCNC: 22 U/L (ref 5–33)
ANION GAP SERPL CALCULATED.3IONS-SCNC: 11 MMOL/L (ref 9–17)
AST SERPL-CCNC: 28 U/L
BILIRUB SERPL-MCNC: 0.21 MG/DL (ref 0.3–1.2)
BUN BLDV-MCNC: 10 MG/DL (ref 6–20)
BUN/CREAT BLD: ABNORMAL (ref 9–20)
CALCIUM SERPL-MCNC: 9.1 MG/DL (ref 8.6–10.4)
CHLORIDE BLD-SCNC: 104 MMOL/L (ref 98–107)
CO2: 25 MMOL/L (ref 20–31)
CREAT SERPL-MCNC: 0.59 MG/DL (ref 0.5–0.9)
ESTIMATED AVERAGE GLUCOSE: 131 MG/DL
GFR AFRICAN AMERICAN: >60 ML/MIN
GFR NON-AFRICAN AMERICAN: >60 ML/MIN
GFR SERPL CREATININE-BSD FRML MDRD: ABNORMAL ML/MIN/{1.73_M2}
GFR SERPL CREATININE-BSD FRML MDRD: ABNORMAL ML/MIN/{1.73_M2}
GLUCOSE BLD-MCNC: 159 MG/DL (ref 70–99)
HBA1C MFR BLD: 6.2 % (ref 4–6)
POTASSIUM SERPL-SCNC: 3.9 MMOL/L (ref 3.7–5.3)
SODIUM BLD-SCNC: 140 MMOL/L (ref 135–144)
T3 FREE: 2.92 PG/ML (ref 2.02–4.43)
THYROXINE, FREE: 0.88 NG/DL (ref 0.93–1.7)
TOTAL PROTEIN: 7.2 G/DL (ref 6.4–8.3)
TSH SERPL DL<=0.05 MIU/L-ACNC: 1.16 MIU/L (ref 0.3–5)
VITAMIN D 25-HYDROXY: 45.4 NG/ML (ref 30–100)

## 2020-06-23 PROCEDURE — 80053 COMPREHEN METABOLIC PANEL: CPT

## 2020-06-23 PROCEDURE — 83036 HEMOGLOBIN GLYCOSYLATED A1C: CPT

## 2020-06-23 PROCEDURE — 84481 FREE ASSAY (FT-3): CPT

## 2020-06-23 PROCEDURE — 36415 COLL VENOUS BLD VENIPUNCTURE: CPT

## 2020-06-23 PROCEDURE — 84443 ASSAY THYROID STIM HORMONE: CPT

## 2020-06-23 PROCEDURE — 84439 ASSAY OF FREE THYROXINE: CPT

## 2020-06-23 PROCEDURE — 82306 VITAMIN D 25 HYDROXY: CPT

## 2020-06-25 ENCOUNTER — HOSPITAL ENCOUNTER (OUTPATIENT)
Dept: PREADMISSION TESTING | Age: 50
Setting detail: SPECIMEN
Discharge: HOME | End: 2020-06-29
Payer: COMMERCIAL

## 2020-06-25 PROCEDURE — U0004 COV-19 TEST NON-CDC HGH THRU: HCPCS

## 2020-06-26 LAB
SARS-COV-2, PCR: NOT DETECTED
SARS-COV-2, RAPID: NORMAL
SARS-COV-2: NORMAL
SOURCE: NORMAL

## 2020-06-27 ENCOUNTER — TELEPHONE (OUTPATIENT)
Dept: PRIMARY CARE CLINIC | Age: 50
End: 2020-06-27

## 2020-06-29 ENCOUNTER — HOSPITAL ENCOUNTER (OUTPATIENT)
Age: 50
Setting detail: OUTPATIENT SURGERY
Discharge: HOME | End: 2020-06-29
Attending: INTERNAL MEDICINE | Admitting: INTERNAL MEDICINE
Payer: COMMERCIAL

## 2020-06-29 ENCOUNTER — ANESTHESIA (OUTPATIENT)
Dept: ENDOSCOPY | Age: 50
End: 2020-06-29
Payer: COMMERCIAL

## 2020-06-29 ENCOUNTER — ANESTHESIA EVENT (OUTPATIENT)
Dept: ENDOSCOPY | Age: 50
End: 2020-06-29
Payer: COMMERCIAL

## 2020-06-29 VITALS
HEART RATE: 60 BPM | SYSTOLIC BLOOD PRESSURE: 121 MMHG | OXYGEN SATURATION: 96 % | HEIGHT: 66 IN | RESPIRATION RATE: 16 BRPM | BODY MASS INDEX: 35.36 KG/M2 | DIASTOLIC BLOOD PRESSURE: 83 MMHG | TEMPERATURE: 97.2 F | WEIGHT: 220 LBS

## 2020-06-29 VITALS — SYSTOLIC BLOOD PRESSURE: 152 MMHG | OXYGEN SATURATION: 100 % | TEMPERATURE: 97.7 F | DIASTOLIC BLOOD PRESSURE: 91 MMHG

## 2020-06-29 PROCEDURE — 2709999900 HC NON-CHARGEABLE SUPPLY: Performed by: INTERNAL MEDICINE

## 2020-06-29 PROCEDURE — 2500000003 HC RX 250 WO HCPCS: Performed by: NURSE ANESTHETIST, CERTIFIED REGISTERED

## 2020-06-29 PROCEDURE — 45378 DIAGNOSTIC COLONOSCOPY: CPT | Performed by: INTERNAL MEDICINE

## 2020-06-29 PROCEDURE — 3609027000 HC COLONOSCOPY: Performed by: INTERNAL MEDICINE

## 2020-06-29 PROCEDURE — 2580000003 HC RX 258: Performed by: ANESTHESIOLOGY

## 2020-06-29 PROCEDURE — 3700000000 HC ANESTHESIA ATTENDED CARE: Performed by: INTERNAL MEDICINE

## 2020-06-29 PROCEDURE — 7100000001 HC PACU RECOVERY - ADDTL 15 MIN: Performed by: INTERNAL MEDICINE

## 2020-06-29 PROCEDURE — 6360000002 HC RX W HCPCS: Performed by: NURSE ANESTHETIST, CERTIFIED REGISTERED

## 2020-06-29 PROCEDURE — 7100000000 HC PACU RECOVERY - FIRST 15 MIN: Performed by: INTERNAL MEDICINE

## 2020-06-29 PROCEDURE — 3700000001 HC ADD 15 MINUTES (ANESTHESIA): Performed by: INTERNAL MEDICINE

## 2020-06-29 RX ORDER — SODIUM CHLORIDE, SODIUM LACTATE, POTASSIUM CHLORIDE, CALCIUM CHLORIDE 600; 310; 30; 20 MG/100ML; MG/100ML; MG/100ML; MG/100ML
INJECTION, SOLUTION INTRAVENOUS CONTINUOUS
Status: DISCONTINUED | OUTPATIENT
Start: 2020-06-29 | End: 2020-06-29 | Stop reason: HOSPADM

## 2020-06-29 RX ORDER — PROPOFOL 10 MG/ML
INJECTION, EMULSION INTRAVENOUS PRN
Status: DISCONTINUED | OUTPATIENT
Start: 2020-06-29 | End: 2020-06-29 | Stop reason: SDUPTHER

## 2020-06-29 RX ORDER — LIDOCAINE HYDROCHLORIDE 10 MG/ML
INJECTION, SOLUTION EPIDURAL; INFILTRATION; INTRACAUDAL; PERINEURAL PRN
Status: DISCONTINUED | OUTPATIENT
Start: 2020-06-29 | End: 2020-06-29 | Stop reason: SDUPTHER

## 2020-06-29 RX ADMIN — LIDOCAINE HYDROCHLORIDE 50 MG: 10 INJECTION, SOLUTION EPIDURAL; INFILTRATION; INTRACAUDAL; PERINEURAL at 10:54

## 2020-06-29 RX ADMIN — SODIUM CHLORIDE, POTASSIUM CHLORIDE, SODIUM LACTATE AND CALCIUM CHLORIDE: 600; 310; 30; 20 INJECTION, SOLUTION INTRAVENOUS at 10:31

## 2020-06-29 RX ADMIN — PROPOFOL 200 MG: 10 INJECTION, EMULSION INTRAVENOUS at 10:54

## 2020-06-29 ASSESSMENT — PULMONARY FUNCTION TESTS
PIF_VALUE: 1

## 2020-06-29 ASSESSMENT — PAIN SCALES - GENERAL
PAINLEVEL_OUTOF10: 0
PAINLEVEL_OUTOF10: 0

## 2020-06-29 ASSESSMENT — ENCOUNTER SYMPTOMS: STRIDOR: 0

## 2020-06-29 NOTE — OP NOTE
COLONOSCOPY    DATE OF PROCEDURE: 6/29/2020    SURGEON: Keara Rubio MD    ASSISTANT: None    PREOPERATIVE DIAGNOSIS: Screening colonoscopy    POSTOPERATIVE DIAGNOSIS: No lesions seen    OPERATION: Total colonoscopy     ANESTHESIA: MAC    ESTIMATED BLOOD LOSS: None    COMPLICATIONS: None     SPECIMENS:  Was Not Obtained    HISTORY: The patient is a 44y.o. year old female with history of above preop diagnosis. I recommended colonoscopy with possible biopsy or polypectomy and I explained the risk, benefits, expected outcome, and alternatives to the procedure. Risks included but are not limited to bleeding, infection, respiratory distress, hypotension, and perforation of the colon and possibility of missing a lesion. The patient understands and is in agreement. PROCEDURE:  The patient's SPO2 remained above 90% throughout the procedure. Digital rectal exam was normal.  The colonoscope was inserted through the anus into the rectum and advanced under direct vision to the cecum without difficulty. Terminal ileum was examined for approximately 2 inches. The prep was good. Findings:  Terminal ileum: normal    Cecum/Ascending colon: normal    Transverse colon: normal    Descending/Sigmoid colon: normal    Rectum/Anus: examined in normal and retroflexed positions and was normal    Withdrawal Time was (minutes): 10    Anastomosis appears to be at about 25 cm from the anus      The colon was decompressed and the scope was removed. The patient tolerated the procedure without unusual events. During the procedure, the patient's blood pressure, pulse and oxygen saturation remained stable and documented. No unusual events occurred during the procedure. Patient was transferred to recovery room and will be discharged when criteria is met. Recommendations/Plan:   1.   2. F/U In Office as instructed  3. Discussed with the family  4. High fiber diet   5.  Precautions to avoid constipation     Next

## 2020-06-29 NOTE — H&P
HISTORY and [facility] [id number]       NAME:  Fernando Sullivan  MRN: [id number]   YOB: 1970   Date: 6/29/2020   Age: 44 y.o. Gender: female       COMPLAINT AND PRESENT HISTORY:     Fernando Sullivan is 44 y.o.,   female, having a screening colonoscopy. Pt has had previous colonoscopy. Denies history of colon polyps. Reports history of perforated colon with colostomy placement and subsequent reversal about 10 years ago. Denies any abdominal pain, cramping, heartburn, nausea, vomiting, diarrhea, constipation, hematochezia, or melena. Denies Family Hx of cancer colon. Completed prep. NPO. Took metoprolol, fludicortisone this am with sip of water. No blood thinners last 7 days. Denies any current chest pain/pressure, palpitations, SOB, recent URI, fever or chills.        PAST MEDICAL HISTORY     Past Medical History:   Diagnosis Date    Asthma     Bowel perforation (HCC)     Diverticula of intestine     Fatty liver     GERD (gastroesophageal reflux disease)     Hepatic steatosis     Hepatomegaly     Hyperlipidemia     Nausea & vomiting     Osteoarthritis     Vasodepressor syncope        SURGICAL HISTORY       Past Surgical History:   Procedure Laterality Date    CHOLECYSTECTOMY, LAPAROSCOPIC  09/25/14    COLONOSCOPY      COLOSTOMY      AND REVERSAL after bowel perforation    ENDOMETRIAL ABLATION      HYSTERECTOMY      JOINT REPLACEMENT Right     PARTIAL knee    OTHER SURGICAL HISTORY Right 7/2/15    exploration radial laceration    [place] OFFICE/OUTPT VISIT,PROCEDURE ONLY N/A 6/18/2018    NECK ABSCESS I & D POSTERIOR performed by Jaya Cortez MD at [address] [place] [address] Drive SIGMOIDECTOMY      Sigmoid Colectomy and reversal    TOTAL KNEE ARTHROPLASTY Left 7/1/2019    KNEE TOTAL ARTHROPLASTY - LEFT WITH BIOMET AND GPS C ARM NEEDED (X-RAY NOTIFIED) performed by Andree Arreguin MD at [address] [place] [address] 5/1/2020    EGD BIOPSY performed by Bre Arellano MD at [address] [address] [address]  09/25/14    open with mesh       FAMILY HISTORY       Family History   Problem Relation Age of Onset    Diabetes Mother     High Blood Pressure Mother     High Cholesterol Mother     Osteoporosis Mother     COPD Father     Coronary Art Dis Father     Cancer Father         lung    Breast Cancer Maternal Grandmother        SOCIAL HISTORY       Social History     Socioeconomic History    Marital status:      Spouse name: None    Number of children: None    Years of education: None    Highest education level: None   Occupational History    None   Social Needs    Financial resource strain: None    Food insecurity     Worry: None     Inability: None    Transportation needs     Medical: None     Non-medical: None   Tobacco Use    Smoking status: Never Smoker    Smokeless tobacco: Never Used   Substance and Sexual Activity    Alcohol use:  Yes     Alcohol/week: 2.0 standard drinks     Types: 2 Cans of beer per week     Frequency: Monthly or less     Comment: OCCASIONAL    Drug use: No    Sexual activity: Yes     Partners: Male   Lifestyle    Physical activity     Days per week: None     Minutes per session: None    Stress: None   Relationships    Social connections     Talks on phone: None     Gets together: None     Attends Orthodox service: None     Active member of club or organization: None     Attends meetings of clubs or organizations: None     Relationship status: None    Intimate partner violence     Fear of current or ex partner: None     Emotionally abused: None     Physically abused: None     Forced sexual activity: None   Other Topics Concern    None   Social History Narrative    None        REVIEW OF SYSTEMS      Allergies   Allergen Reactions    Bactrim [Sulfamethoxazole-Trimethoprim] Anaphylaxis    Dilaudid [Hydromorphone] Rash    Morphine And Related Shortness Of Breath, Nausea And Vomiting and Rash    Sulfa Antibiotics Hives, Swelling and Rash    Tramadol Itching and Rash    Keflex [Cephalexin] Itching     Patient also states a yeast infection developes    Penicillins Itching    Talwin [Pentazocine]     Codeine Nausea And Vomiting    Fentanyl Itching, Nausea And Vomiting and Other (See Comments)    Fluconazole Itching and Rash       No current facility-administered medications on file prior to encounter. Current Outpatient Medications on File Prior to Encounter   Medication Sig Dispense Refill    pantoprazole (PROTONIX) 20 MG tablet Take 20 mg by mouth daily      fluticasone-vilanterol (BREO ELLIPTA) 100-25 MCG/INH AEPB inhaler 1 puff      hydrOXYzine (ATARAX) 25 MG tablet 1 tablet as needed in the evening      metFORMIN HCl  MG/5ML SRER 1 tablet with evening meal      VORTIoxetine HBr (TRINTELLIX) 20 MG TABS tablet 1 tablet      metoprolol succinate (TOPROL XL) 100 MG extended release tablet Take 100 mg by mouth      famotidine (PEPCID) 20 MG tablet Take 1 tablet by mouth 2 times daily 60 tablet 3    Na Sulfate-K Sulfate-Mg Sulf 17.5-3.13-1.6 GM/177ML SOLN Use as directed in your patient instructions. 1 Bottle 0    aspirin 81 MG chewable tablet Take 1 tablet by mouth 2 times daily 30 tablet 3    rOPINIRole (REQUIP) 0.25 MG tablet Take by mouth nightly      ezetimibe (ZETIA) 10 MG tablet Take 10 mg by mouth daily      alendronate (FOSAMAX) 70 MG tablet Take 70 mg by mouth every 7 days      clonazePAM (KLONOPIN) 0.5 MG tablet TAKE ONE (1) TABLET BY MOUTH TWO (2) TIMES DAILY 60 tablet 1    simvastatin (ZOCOR) 20 MG tablet TAKE 1 TABLET DAILY AT BEDTIME 90 tablet 3    PREMARIN 1.25 MG tablet 1 tablet daily      fludrocortisone (FLORINEF) 0.1 MG tablet Take 0.1 mg by mouth daily.  albuterol (PROVENTIL HFA;VENTOLIN HFA) 108 (90 BASE) MCG/ACT inhaler Inhale 2 puffs into the lungs every 6 hours as needed for Wheezing or Shortness of Breath.        Negative except for what is mentioned in

## 2020-07-13 ENCOUNTER — HOSPITAL ENCOUNTER (OUTPATIENT)
Age: 50
Discharge: HOME | End: 2020-07-15
Payer: COMMERCIAL

## 2020-07-13 ENCOUNTER — HOSPITAL ENCOUNTER (OUTPATIENT)
Dept: GENERAL RADIOLOGY | Age: 50
Discharge: HOME | End: 2020-07-15
Payer: COMMERCIAL

## 2020-07-13 PROCEDURE — 71046 X-RAY EXAM CHEST 2 VIEWS: CPT

## 2020-08-17 ENCOUNTER — HOSPITAL ENCOUNTER (OUTPATIENT)
Age: 50
Discharge: HOME | End: 2020-08-17
Payer: COMMERCIAL

## 2020-08-17 LAB
T3 FREE: 3.06 PG/ML (ref 2.02–4.43)
THYROXINE, FREE: 0.93 NG/DL (ref 0.93–1.7)
TSH SERPL DL<=0.05 MIU/L-ACNC: 2.62 MIU/L (ref 0.3–5)

## 2020-08-17 PROCEDURE — 84439 ASSAY OF FREE THYROXINE: CPT

## 2020-08-17 PROCEDURE — 84481 FREE ASSAY (FT-3): CPT

## 2020-08-17 PROCEDURE — 84443 ASSAY THYROID STIM HORMONE: CPT

## 2020-08-17 PROCEDURE — 36415 COLL VENOUS BLD VENIPUNCTURE: CPT

## 2021-08-21 LAB
AVERAGE GLUCOSE: NORMAL
HBA1C MFR BLD: 7 %

## 2021-11-29 LAB
AVERAGE GLUCOSE: NORMAL
HBA1C MFR BLD: 7.9 %

## 2022-06-29 ENCOUNTER — OFFICE VISIT (OUTPATIENT)
Dept: ORTHOPEDIC SURGERY | Age: 52
End: 2022-06-29
Payer: COMMERCIAL

## 2022-06-29 DIAGNOSIS — Z96.652 HISTORY OF TOTAL LEFT KNEE REPLACEMENT: Primary | ICD-10-CM

## 2022-06-29 PROCEDURE — 99213 OFFICE O/P EST LOW 20 MIN: CPT | Performed by: ORTHOPAEDIC SURGERY

## 2022-06-29 NOTE — PROGRESS NOTES
Stefano Agosto M.D.            [address] [place] [address]., [address] [facility],[address], [address] [facility]           Dept Phone: [phone]           Dept Fax:  [address]           Oscar Delatorre          Dept Phone: [phone]           Dept Fax:  [phone]      Chief Compliant:  Chief Complaint   Patient presents with    Pain     h/o Lt TKA 7/1/19        History of Present Illness: This is a 46 y.o. female who presents to the clinic today for evaluation / follow up of bilateral knees. Patient is status post a right medial compartment arthroplasty in January 2007. She had a left total knee done by me in 2019 and she really has no complaints with her knee per se. She is actually having problems with her right ankle she says it is chronically swollen and painful for her she denies any injury or trauma. She is not had any previous evaluation for this. Review of Systems   Constitutional: Negative for fever, chills, sweats. Eyes: Negative for changes in vision, or pain. HENT: Negative for ear ache, epistaxis, or sore throat. Respiratory/Cardio: Negative for Chest pain, palpitations, SOB, or cough. Gastrointestinal: Negative for abdominal pain, N/V/D. Genitourinary: Negative for dysuria, frequency, urgency, or hematuria. Neurological: Negative for headache, numbness, or weakness. Integumentary: Negative for rash, itching, laceration, or abrasion. Musculoskeletal: Positive for Pain (h/o Lt TKA 7/1/19)       Physical Exam:  Constitutional: Patient is oriented to person, place, and time. Patient appears well-developed and well nourished. HENT: Negative otherwise noted  Head: Normocephalic and Atraumatic  Nose: Normal  Eyes: Conjunctivae and EOM are normal  Neck: Normal range of motion Neck supple.     Respiratory/Cardio: Effort normal. No respiratory distress. Musculoskeletal: Examination the patient's right knee notes medial compartment scar. She has a slight varus disposition of the knee but she maintains great motion 0 to 135 degrees with good stability varus valgus stressing and patellar tracking. No effusion and no pain with this at all    Examination the patient's left knee notes a well-healed scar motion 0 to 130 degrees good stability throughout good tracking throughout and no pain or discomfort. Patient's right ankle was not examined by me today  Neurological: Patient is alert and oriented to person, place, and time. Normal strenght. No sensory deficit. Skin: Skin is warm and dry  Psychiatric: Behavior is normal. Thought content normal.  Nursing note and vitals reviewed. Labs and Imaging:     XR taken today:  XR KNEE RIGHT (1-2 VIEWS)    Result Date: 6/29/2022  X-rays taken today reviewed by me show standing AP of both knees and a lateral the right knee. Patient is status post right unicompartmental arthroplasty. Patient's good component appears to be overall in adequate position when compared to previous x-rays. She has not developed any lateral disease nor any patellofemoral disease. Patient has a left total knee arthroplasty looks good as well. No orders of the defined types were placed in this encounter. Assessment and Plan:  Status post right unicompartmental arthroplasty January 2007  Status post left total knee 2019  Right ankle pain        This is a 46 y.o. female who presents to the clinic today for evaluation / follow up of bilateral knee arthroplasties.      Past History:    Current Outpatient Medications:     pantoprazole (PROTONIX) 20 MG tablet, Take 20 mg by mouth daily, Disp: , Rfl:     fluticasone-vilanterol (BREO ELLIPTA) 100-25 MCG/INH AEPB inhaler, 1 puff, Disp: , Rfl:     hydrOXYzine (ATARAX) 25 MG tablet, 1 tablet as needed in the evening, Disp: , Rfl:     metFORMIN HCl  MG/5ML SRER, 1 tablet with evening meal, Disp: , Rfl:     VORTIoxetine HBr (TRINTELLIX) 20 MG TABS tablet, 1 tablet, Disp: , Rfl:     metoprolol succinate (TOPROL XL) 100 MG extended release tablet, Take 100 mg by mouth, Disp: , Rfl:     famotidine (PEPCID) 20 MG tablet, Take 1 tablet by mouth 2 times daily, Disp: 60 tablet, Rfl: 3    Na Sulfate-K Sulfate-Mg Sulf 17.5-3.13-1.6 GM/177ML SOLN, Use as directed in your patient instructions. , Disp: 1 Bottle, Rfl: 0    aspirin 81 MG chewable tablet, Take 1 tablet by mouth 2 times daily, Disp: 30 tablet, Rfl: 3    rOPINIRole (REQUIP) 0.25 MG tablet, Take by mouth nightly, Disp: , Rfl:     ezetimibe (ZETIA) 10 MG tablet, Take 10 mg by mouth daily, Disp: , Rfl:     alendronate (FOSAMAX) 70 MG tablet, Take 70 mg by mouth every 7 days, Disp: , Rfl:     clonazePAM (KLONOPIN) 0.5 MG tablet, TAKE ONE (1) TABLET BY MOUTH TWO (2) TIMES DAILY, Disp: 60 tablet, Rfl: 1    simvastatin (ZOCOR) 20 MG tablet, TAKE 1 TABLET DAILY AT BEDTIME, Disp: 90 tablet, Rfl: 3    PREMARIN 1.25 MG tablet, 1 tablet daily, Disp: , Rfl:     fludrocortisone (FLORINEF) 0.1 MG tablet, Take 0.1 mg by mouth daily. , Disp: , Rfl:     albuterol (PROVENTIL HFA;VENTOLIN HFA) 108 (90 BASE) MCG/ACT inhaler, Inhale 2 puffs into the lungs every 6 hours as needed for Wheezing or Shortness of Breath., Disp: , Rfl:   Allergies   Allergen Reactions    Bactrim [Sulfamethoxazole-Trimethoprim] Anaphylaxis    Dilaudid [Hydromorphone] Rash    Morphine And Related Shortness Of Breath, Nausea And Vomiting and Rash    Sulfa Antibiotics Hives, Swelling and Rash    Tramadol Itching and Rash    Keflex [Cephalexin] Itching     Patient also states a yeast infection developes    Penicillins Itching    Talwin [Pentazocine]     Codeine Nausea And Vomiting    Fentanyl Itching, Nausea And Vomiting and Other (See Comments)    Fluconazole Itching and Rash     Social History     Socioeconomic History    Marital status:      Spouse name: Not on file    Number of children: Not on file    Years of education: Not on file    Highest education level: Not on file   Occupational History    Not on file   Tobacco Use    Smoking status: Never Smoker    Smokeless tobacco: Never Used   Vaping Use    Vaping Use: Never used   Substance and Sexual Activity    Alcohol use: Yes     Alcohol/week: 2.0 standard drinks     Types: 2 Cans of beer per week     Comment: OCCASIONAL    Drug use: No    Sexual activity: Yes     Partners: Male   Other Topics Concern    Not on file   Social History Narrative    Not on file     Social Determinants of Health     Financial Resource Strain:     Difficulty of Paying Living Expenses: Not on file   Food Insecurity:     Worried About Running Out of Food in the Last Year: Not on file    Celsa of Food in the Last Year: Not on file   Transportation Needs:     Lack of Transportation (Medical): Not on file    Lack of Transportation (Non-Medical):  Not on file   Physical Activity:     Days of Exercise per Week: Not on file    Minutes of Exercise per Session: Not on file   Stress:     Feeling of Stress : Not on file   Social Connections:     Frequency of Communication with Friends and Family: Not on file    Frequency of Social Gatherings with Friends and Family: Not on file    Attends Orthodoxy Services: Not on file    Active Member of [address] [place] [address] Expert TA or Organizations: Not on file    Attends Club or Organization Meetings: Not on file    Marital Status: Not on file   Intimate Partner Violence:     Fear of Current or Ex-Partner: Not on file    Emotionally Abused: Not on file    Physically Abused: Not on file    Sexually Abused: Not on file   Housing Stability:     Unable to Pay for Housing in the Last Year: Not on file    Number of [place] in the Last Year: Not on file    Unstable Housing in the Last Year: Not on file     Past Medical History:   Diagnosis Date    Asthma     Bowel perforation ([facility] [address]     Diverticula to Dr. Ed Willams for right ankle pain. Patient return back here in 2 years time for repeat x-rays or call if she has problems prior to that time      Provider Attestation:  Inocente Persaud, personally performed the services described in this documentation. All medical record entries made by the scribe were at my direction and in my presence. I have reviewed the chart and discharge instructions and agree that the records reflect my personal performance and is accurate and complete. Arthur Artis MD. 06/29/22      Please note that this chart was generated using voice recognition Dragon dictation software. Although every effort was made to ensure the accuracy of this automated transcription, some errors in transcription may have occurred.

## 2022-07-12 DIAGNOSIS — M25.571 RIGHT ANKLE PAIN, UNSPECIFIED CHRONICITY: Primary | ICD-10-CM

## 2022-07-18 ENCOUNTER — OFFICE VISIT (OUTPATIENT)
Dept: ORTHOPEDIC SURGERY | Age: 52
End: 2022-07-18
Payer: COMMERCIAL

## 2022-07-18 VITALS — BODY MASS INDEX: 35.36 KG/M2 | WEIGHT: 220 LBS | RESPIRATION RATE: 16 BRPM | HEIGHT: 66 IN | OXYGEN SATURATION: 100 %

## 2022-07-18 DIAGNOSIS — M79.89 SOFT TISSUE MASS: ICD-10-CM

## 2022-07-18 DIAGNOSIS — M19.071 OSTEOARTHRITIS OF RIGHT ANKLE OR FOOT: Primary | ICD-10-CM

## 2022-07-18 DIAGNOSIS — M76.821 POSTERIOR TIBIAL TENDINITIS OF RIGHT LOWER EXTREMITY: ICD-10-CM

## 2022-07-18 DIAGNOSIS — M25.571 RIGHT ANKLE PAIN, UNSPECIFIED CHRONICITY: Primary | ICD-10-CM

## 2022-07-18 PROCEDURE — 99214 OFFICE O/P EST MOD 30 MIN: CPT | Performed by: ORTHOPAEDIC SURGERY

## 2022-07-18 NOTE — PROGRESS NOTES
[facility]  [address] [address]  Dept: [phone]    Ambulatory Orthopedic Consult      CHIEF COMPLAINT:    Chief Complaint   Patient presents with    Ankle Pain     Right       HISTORY OF PRESENT ILLNESS:      The patient is a 46 y.o. female who is being seen for evaluation of the above, which began around 7/1/21 atraumatically. At today's visit, she is using no brace/assistive device. History is obtained today from:   [x]  the patient     [x]  EMR     []  one family member/friend    []  multiple family members/friends    []  other: At today's visit, she localizes her pain to the right medial hindfoot/anteromedial ankle. REVIEW OF SYSTEMS:  Musculoskeletal: See HPI for pertinent positives     Past Medical History:    She  has a past medical history of Asthma, Bowel perforation ([facility] [address] Diverticula of intestine, Fatty liver, GERD (gastroesophageal reflux disease), Hepatic steatosis, Hepatomegaly, Hyperlipidemia, Nausea & vomiting, Osteoarthritis, and Vasodepressor syncope. Past Surgical History:    She  has a past surgical history that includes Endometrial ablation; Hysterectomy; sigmoidectomy; ventral hernia repair (09/25/14); Cholecystectomy, laparoscopic (09/25/14); other surgical history (Right, 7/2/15); Colonoscopy; joint replacement (Right); colostomy; pr office/outpt visit,procedure only (N/A, 6/18/2018); Total knee arthroplasty (Left, 7/1/2019); Upper gastrointestinal endoscopy (N/A, 5/1/2020); and Colonoscopy (N/A, 6/29/2020).      Current Medications:     Current Outpatient Medications:     pantoprazole (PROTONIX) 20 MG tablet, Take 20 mg by mouth daily, Disp: , Rfl:     fluticasone-vilanterol (BREO ELLIPTA) 100-25 MCG/INH AEPB inhaler, 1 puff, Disp: , Rfl:     hydrOXYzine (ATARAX) 25 MG tablet, 1 tablet as needed in the evening, Disp: , Rfl:     metFORMIN HCl  MG/5ML SRER, 1 tablet with evening meal, Disp: , Rfl:     VORTIoxetine HBr (TRINTELLIX) 20 MG TABS tablet, 1 tablet, Disp: , Rfl:     metoprolol succinate (TOPROL XL) 100 MG extended release tablet, Take 100 mg by mouth, Disp: , Rfl:     famotidine (PEPCID) 20 MG tablet, Take 1 tablet by mouth 2 times daily, Disp: 60 tablet, Rfl: 3    Na Sulfate-K Sulfate-Mg Sulf 17.5-3.13-1.6 GM/177ML SOLN, Use as directed in your patient instructions. , Disp: 1 Bottle, Rfl: 0    aspirin 81 MG chewable tablet, Take 1 tablet by mouth 2 times daily, Disp: 30 tablet, Rfl: 3    rOPINIRole (REQUIP) 0.25 MG tablet, Take by mouth nightly, Disp: , Rfl:     ezetimibe (ZETIA) 10 MG tablet, Take 10 mg by mouth daily, Disp: , Rfl:     alendronate (FOSAMAX) 70 MG tablet, Take 70 mg by mouth every 7 days, Disp: , Rfl:     clonazePAM (KLONOPIN) 0.5 MG tablet, TAKE ONE (1) TABLET BY MOUTH TWO (2) TIMES DAILY, Disp: 60 tablet, Rfl: 1    simvastatin (ZOCOR) 20 MG tablet, TAKE 1 TABLET DAILY AT BEDTIME, Disp: 90 tablet, Rfl: 3    PREMARIN 1.25 MG tablet, 1 tablet daily, Disp: , Rfl:     fludrocortisone (FLORINEF) 0.1 MG tablet, Take 0.1 mg by mouth daily. , Disp: , Rfl:     albuterol (PROVENTIL HFA;VENTOLIN HFA) 108 (90 BASE) MCG/ACT inhaler, Inhale 2 puffs into the lungs every 6 hours as needed for Wheezing or Shortness of Breath., Disp: , Rfl:      Allergies:    Bactrim [sulfamethoxazole-trimethoprim], Dilaudid [hydromorphone], Morphine and related, Sulfa antibiotics, Tramadol, Keflex [cephalexin], Penicillins, Talwin [pentazocine], Codeine, Fentanyl, and Fluconazole    Family History:  family history includes Breast Cancer in her maternal grandmother; COPD in her father; Cancer in her father; Coronary Art Dis in her father; Diabetes in her mother; High Blood Pressure in her mother; High Cholesterol in her mother; Osteoporosis in her mother.     Social History:   Social History     Occupational History    Not on file   Tobacco Use    Smoking status: Never    Smokeless tobacco: Never   Vaping Use    Vaping Use: Never used   Substance and Sexual Activity    Alcohol use: Yes     Alcohol/week: 2.0 standard drinks     Types: 2 Cans of beer per week     Comment: OCCASIONAL    Drug use: No    Sexual activity: Yes     Partners: Male     homemaker    OBJECTIVE:  Resp 16   Ht 5' 6\" (1.676 m)   Wt 220 lb (99.8 kg)   SpO2 100%   BMI 35.51 kg/m²    Psych: awake, alert  Cardio:  well perfused extremities, no cyanosis  Resp:  normal respiratory effort  Musculoskeletal:    RLE:  Vascular: Limb well perfused, compartments soft/compressible. Skin: No erythema/ulcers. Intact. Neurovascular Status:  Grossly neurovascularly intact throughout   Tenderness to Palpation: Anteromedial ankle with localized swelling greater than PTT  -Mild pain with resisted inversion, 5/5 strength  -Pes planus, equinus  -Rubbery, nonmobile soft tissue mass at the anteromedial ankle, versus possible fatty deposition      LLE:  Vascular: Limb well perfused, compartments soft/compressible. Skin: No erythema/ulcers. Intact. Neurovascular Status:  Grossly neurovascularly intact throughout   Tenderness to Palpation:    -      RADIOLOGY:   7/18/2022 FINDINGS:  Three views (AP, Mortise, and Lateral) of the right ankle and three views (AP, Oblique, Lateral) of the right foot were obtained in the office today and reviewed, revealing no acute fracture, dislocation, or radiopaque foreign body/tumor. Degenerative changes at the tibiotalar joint with spacing, sclerosis. Meary's angle is apex plantar. IMPRESSION:  No acute fracture/dislocation. Degenerative changes above. Pes planus. Electronically signed by Viraj Yang MD      ASSESSMENT AND PLAN:  Body mass index is 35.51 kg/m².        She has right foot/ankle pain, possibly secondary to ankle arthritis with posterior tibial tendinitis, with a mild underlying pes planus deformity, as well as a possible soft tissue mass at the anteromedial aspect of the ankle.     Notably, she has the past medical history as above. She has a history of GI/stomach ulcer, asthma, fibromyalgia, coronary artery disease, history of bowel perforation with colostomy status post bowel reversal, and insulin resistance. We had a discussion today about the likely diagnosis and its natural history, physical exam and imaging findings, as well as various treatment options in detail. Surgically, we discussed that no surgical invention is indicated at this time, and I have first recommended imaging as below. Orders/referrals were placed as below at today's visit. The patient was provided information on how to obtain an over-the-counter flatfoot style orthotic. I provided a prescription for Voltaren (4g TOPL q QID PRN pain). I recommend this over the use of oral NSAIDs because of the patient's history of GERD/GI ulcer. In order to know exactly how to proceed with treatment, an MRI with/without contrast was ordered today to evaluate the anterior medial ankle soft tissue mass, the ankle joint and posterior tibial tendon. This is medically necessary to evaluate the structures in this area, for both diagnosis and treatment. All questions were answered and the above plan was agreed upon. The patient will return to clinic after the MRI has been obtained without x-rays. At her next visit, we will review her MRI, and possibly consider physical therapy for right flatfoot protocol, and/or custom brace, and possible ankle injection, versus surgery.          At the patient's next visit, depending on how the patient is doing and/or new imaging/labs results, we may consider the following options:    []  Lace up ankle     []  CAM boot         []  removable wrist brace     []  PT:        []  Wean out immobilization         []  Adv activity      []  Footmind        []  Spenco       []  Custom Orthotic:               []  AZ brace                    []  Rocker Bottom      []  Night splint    [] Heel cups        []  Strap        []  Toe gizmos    []  Topl        []  NSAIDs         []  Ana        []  Ref:         []  Stress Xray    []  CT        []  MRI  []  Inj:          []  Consider OR      []  Pick OR date    No follow-ups on file. No orders of the defined types were placed in this encounter. No orders of the defined types were placed in this encounter. Garima Boykin MD  Orthopedic Surgery        Please excuse any typos/errors, as this note was created with the assistance of voice recognition software. While intending to generate a document that actually reflects the content of the visit, the document can still have some errors including those of syntax and sound-a-like substitutions which may escape proof reading. In such instances, actual meaning can be extrapolated by context.

## 2022-07-18 NOTE — LETTER
[address]  [address] [facility] [address]  [address]  Phone: [phone]  Fax: [phone]    Aarti Tabares MD    July 18, 2022     Manuel Dave MD  [address]    Patient: Inga Magaña   MR Number: [id number]   YOB: 1970   Date of Visit: 7/18/2022       Dear Manuel Dave:    Thank you for referring Reggie Brar to me for evaluation/treatment. Below are the relevant portions of my assessment and plan of care. She has right foot/ankle pain, possibly secondary to ankle arthritis with posterior tibial tendinitis, with a mild underlying pes planus deformity, as well as a possible soft tissue mass at the anteromedial aspect of the ankle. Notably, she has the past medical history as above. She has a history of GI/stomach ulcer, asthma, fibromyalgia, coronary artery disease, history of bowel perforation with colostomy status post bowel reversal, and insulin resistance. We had a discussion today about the likely diagnosis and its natural history, physical exam and imaging findings, as well as various treatment options in detail. Surgically, we discussed that no surgical invention is indicated at this time, and I have first recommended imaging as below. Orders/referrals were placed as below at today's visit. The patient was provided information on how to obtain an over-the-counter flatfoot style orthotic. I provided a prescription for Voltaren (4g TOPL q QID PRN pain). I recommend this over the use of oral NSAIDs because of the patient's history of GERD/GI ulcer. In order to know exactly how to proceed with treatment, an MRI with/without contrast was ordered today to evaluate the anterior medial ankle soft tissue mass, the ankle joint and posterior tibial tendon. This is medically necessary to evaluate the structures in this area, for both diagnosis and treatment.      All questions were answered and the above plan was agreed upon. The patient will return to clinic after the MRI has been obtained without x-rays. At her next visit, we will review her MRI, and possibly consider physical therapy for right flatfoot protocol, and/or custom brace, and possible ankle injection, versus surgery. If you have questions, please do not hesitate to call me. I look forward to following Luis León along with you.     Sincerely,      Anna Baptiste MD

## 2022-08-02 ENCOUNTER — HOSPITAL ENCOUNTER (OUTPATIENT)
Dept: MRI IMAGING | Age: 52
Discharge: HOME | End: 2022-08-04
Payer: COMMERCIAL

## 2022-08-02 DIAGNOSIS — M76.821 POSTERIOR TIBIAL TENDINITIS OF RIGHT LOWER EXTREMITY: ICD-10-CM

## 2022-08-02 DIAGNOSIS — M79.89 SOFT TISSUE MASS: ICD-10-CM

## 2022-08-02 PROCEDURE — 73723 MRI JOINT LWR EXTR W/O&W/DYE: CPT

## 2022-08-02 PROCEDURE — A9579 GAD-BASE MR CONTRAST NOS,1ML: HCPCS | Performed by: ORTHOPAEDIC SURGERY

## 2022-08-02 PROCEDURE — 6360000004 HC RX CONTRAST MEDICATION: Performed by: ORTHOPAEDIC SURGERY

## 2022-08-02 RX ORDER — SODIUM CHLORIDE 0.9 % (FLUSH) 0.9 %
10 SYRINGE (ML) INJECTION PRN
Status: DISCONTINUED | OUTPATIENT
Start: 2022-08-02 | End: 2022-08-05 | Stop reason: HOSPADM

## 2022-08-02 RX ADMIN — GADOTERIDOL 20 ML: 279.3 INJECTION, SOLUTION INTRAVENOUS at 10:10

## 2022-08-04 PROCEDURE — 2709999900 HC NON-CHARGEABLE SUPPLY

## 2022-08-08 ENCOUNTER — OFFICE VISIT (OUTPATIENT)
Dept: ORTHOPEDIC SURGERY | Age: 52
End: 2022-08-08
Payer: COMMERCIAL

## 2022-08-08 VITALS — BODY MASS INDEX: 35.36 KG/M2 | RESPIRATION RATE: 16 BRPM | OXYGEN SATURATION: 100 % | WEIGHT: 220 LBS | HEIGHT: 66 IN

## 2022-08-08 DIAGNOSIS — M19.071 OSTEOARTHRITIS OF RIGHT ANKLE OR FOOT: ICD-10-CM

## 2022-08-08 DIAGNOSIS — M76.821 POSTERIOR TIBIAL TENDINITIS OF RIGHT LOWER EXTREMITY: Primary | ICD-10-CM

## 2022-08-08 PROCEDURE — 99213 OFFICE O/P EST LOW 20 MIN: CPT | Performed by: ORTHOPAEDIC SURGERY

## 2022-08-08 NOTE — PROGRESS NOTES
[facility] 2.  [address] [address]  Dept: [phone]    Ambulatory Orthopedic Consult      CHIEF COMPLAINT:    Chief Complaint   Patient presents with    Ankle Pain     Right       HISTORY OF PRESENT ILLNESS:      The patient is a 46 y.o. female who is being seen for evaluation of the above, which began around 7/1/21 atraumatically. At today's visit, she is using no brace/assistive device. History is obtained today from:   [x]  the patient     [x]  EMR     []  one family member/friend    []  multiple family members/friends    []  other: At today's visit, she localizes her pain to the right medial hindfoot/anteromedial ankle. INTERVAL HISTORY 8/8/2022:  She is seen again today in the office for follow up of imaging as below. Since being seen last, the patient is doing about the same overall. At today's visit, she is using no brace/assistive device. History is obtained today from:   [x]  the patient     [x]  EMR     []  one family member/friend    []  multiple family members/friends    []  other:        REVIEW OF SYSTEMS:  Musculoskeletal: See HPI for pertinent positives     Past Medical History:    She  has a past medical history of Asthma, Bowel perforation ([facility] [address] Diverticula of intestine, Fatty liver, GERD (gastroesophageal reflux disease), Hepatic steatosis, Hepatomegaly, Hyperlipidemia, Nausea & vomiting, Osteoarthritis, and Vasodepressor syncope. Past Surgical History:    She  has a past surgical history that includes Endometrial ablation; Hysterectomy; sigmoidectomy; ventral hernia repair (09/25/14); Cholecystectomy, laparoscopic (09/25/14); other surgical history (Right, 7/2/15); Colonoscopy; joint replacement (Right); colostomy; pr office/outpt visit,procedure only (N/A, 6/18/2018); Total knee arthroplasty (Left, 7/1/2019);  Upper gastrointestinal endoscopy (N/A, 5/1/2020); and Colonoscopy (N/A, 6/29/2020). Current Medications:     Current Outpatient Medications:     diclofenac sodium (VOLTAREN) 1 % GEL, Apply 4 g topically 4 times daily as needed for Pain, Disp: 200 g, Rfl: 0    pantoprazole (PROTONIX) 20 MG tablet, Take 20 mg by mouth daily, Disp: , Rfl:     fluticasone-vilanterol (BREO ELLIPTA) 100-25 MCG/INH AEPB inhaler, 1 puff, Disp: , Rfl:     hydrOXYzine (ATARAX) 25 MG tablet, 1 tablet as needed in the evening, Disp: , Rfl:     metFORMIN HCl  MG/5ML SRER, 1 tablet with evening meal, Disp: , Rfl:     VORTIoxetine HBr (TRINTELLIX) 20 MG TABS tablet, 1 tablet, Disp: , Rfl:     metoprolol succinate (TOPROL XL) 100 MG extended release tablet, Take 100 mg by mouth, Disp: , Rfl:     famotidine (PEPCID) 20 MG tablet, Take 1 tablet by mouth 2 times daily, Disp: 60 tablet, Rfl: 3    Na Sulfate-K Sulfate-Mg Sulf 17.5-3.13-1.6 GM/177ML SOLN, Use as directed in your patient instructions. , Disp: 1 Bottle, Rfl: 0    aspirin 81 MG chewable tablet, Take 1 tablet by mouth 2 times daily, Disp: 30 tablet, Rfl: 3    rOPINIRole (REQUIP) 0.25 MG tablet, Take by mouth nightly, Disp: , Rfl:     ezetimibe (ZETIA) 10 MG tablet, Take 10 mg by mouth daily, Disp: , Rfl:     alendronate (FOSAMAX) 70 MG tablet, Take 70 mg by mouth every 7 days, Disp: , Rfl:     clonazePAM (KLONOPIN) 0.5 MG tablet, TAKE ONE (1) TABLET BY MOUTH TWO (2) TIMES DAILY, Disp: 60 tablet, Rfl: 1    simvastatin (ZOCOR) 20 MG tablet, TAKE 1 TABLET DAILY AT BEDTIME, Disp: 90 tablet, Rfl: 3    PREMARIN 1.25 MG tablet, 1 tablet daily, Disp: , Rfl:     fludrocortisone (FLORINEF) 0.1 MG tablet, Take 0.1 mg by mouth daily. , Disp: , Rfl:     albuterol (PROVENTIL HFA;VENTOLIN HFA) 108 (90 BASE) MCG/ACT inhaler, Inhale 2 puffs into the lungs every 6 hours as needed for Wheezing or Shortness of Breath., Disp: , Rfl:      Allergies:    Bactrim [sulfamethoxazole-trimethoprim], Dilaudid [hydromorphone], Morphine and related, Sulfa antibiotics, Tramadol, Keflex [cephalexin], Penicillins, Talwin [pentazocine], Codeine, Fentanyl, and Fluconazole    Family History:  family history includes Breast Cancer in her maternal grandmother; COPD in her father; Cancer in her father; Coronary Art Dis in her father; Diabetes in her mother; High Blood Pressure in her mother; High Cholesterol in her mother; Osteoporosis in her mother. Social History:   Social History     Occupational History    Not on file   Tobacco Use    Smoking status: Never    Smokeless tobacco: Never   Vaping Use    Vaping Use: Never used   Substance and Sexual Activity    Alcohol use: Yes     Alcohol/week: 2.0 standard drinks     Types: 2 Cans of beer per week     Comment: OCCASIONAL    Drug use: No    Sexual activity: Yes     Partners: Male     homemaker    OBJECTIVE:  Resp 16   Ht 5' 6\" (1.676 m)   Wt 220 lb (99.8 kg)   SpO2 100%   BMI 35.51 kg/m²    Psych: awake, alert  Cardio:  well perfused extremities, no cyanosis  Resp:  normal respiratory effort  Musculoskeletal:    RLE:  Vascular: Limb well perfused, compartments soft/compressible. Skin: No erythema/ulcers. Intact. Neurovascular Status:  Grossly neurovascularly intact throughout   Tenderness to Palpation: PTT, anteromedial ankle  -Mild pain with resisted inversion, 5/5 strength  -Pes planus, equinus      LLE:  Vascular: Limb well perfused, compartments soft/compressible. Skin: No erythema/ulcers. Intact. Neurovascular Status:  Grossly neurovascularly intact throughout   Tenderness to Palpation:    -      RADIOLOGY:   8/8/2022 Prior images reviewed for reference. MRI images and radiology report reviewed, as below:    1. Remote avulsion fracture fragments along the inferior aspect of the   lateral malleolus. Evidence of remote partial tearing of the ATFL and medial   deltoid ligament complex. 2. Mild diffuse degenerative changes as detailed above. 3. Mild edema in the subcutaneous fat about the ankle and midfoot.    4. Moderate central cord plantar fasciitis. Mild plantar calcaneal spur. 5. Mild peroneal and medial flexor tenosynovitis. Mild insertional   tendinosis of the posterior tibialis tendon.          -Mild degenerative changes noted at the tibiotalar joint and diffusely throughout the midfoot. No discrete mass identified at the anteromedial ankle. Fluid around the posterior tibial tendon. FINDINGS:  Three views (AP, Mortise, and Lateral) of the right ankle and three views (AP, Oblique, Lateral) of the right foot were obtained in the office today and reviewed, revealing no acute fracture, dislocation, or radiopaque foreign body/tumor. Degenerative changes at the tibiotalar joint with spacing, sclerosis. Meary's angle is apex plantar. IMPRESSION:  No acute fracture/dislocation. Degenerative changes above. Pes planus. Electronically signed by Yo Hawley MD      ASSESSMENT AND PLAN:  Body mass index is 35.51 kg/m². She has right foot/ankle pain, secondary to posterior tibial tendinitis, with an underlying mild pes planus deformity, as well as some underlying tibiotalar joint arthritis. Notably, she has the past medical history as above. She has a history of GI/stomach ulcer, asthma, fibromyalgia, coronary artery disease, history of bowel perforation with colostomy status post bowel reversal, and insulin resistance. We had a discussion today about the likely diagnosis and its natural history, physical exam and imaging findings, as well as various treatment options in detail. Surgically, we discussed that no surgical invention is indicated at this time, and I have recommended conservative management. At today's visit, she does state that her pain does not bother her very much, unless she walks for an extended period of time. Orders/referrals were placed as below at today's visit. I encouraged her to follow-up on obtaining the over-the-counter flatfoot style orthotic.   She may use her Voltaren gel as needed (I recommend this over the use of oral NSAIDs because of the patient's history of GERD/GI ulcer). The patient was referred to physical therapy for her posterior tibial tendinitis. All questions were answered and the above plan was agreed upon. The patient will return to clinic in 3 months as needed without x-rays. At her next visit, we may consider continuing physical therapy, possible diagnostic/therapeutic ankle injection, and/or possible custom brace. At the patient's next visit, depending on how the patient is doing and/or new imaging/labs results, we may consider the following options:    []  Lace up ankle     []  CAM boot         []  removable wrist brace     []  PT:        []  Wean out immobilization         []  Adv activity      []  Footmind        []  Spenco       []  Custom Orthotic:               []  AZ brace                    []  Rocker Bottom      []  Night splint    []  Heel cups        []  Strap        []  Toe gizmos    []  Topl        []  NSAIDs         []  Ana        []  Ref:         []  Stress Xray    []  CT        []  MRI  []  Inj:          []  Consider OR      []  Pick OR date    No follow-ups on file. No orders of the defined types were placed in this encounter. No orders of the defined types were placed in this encounter. Pete Bass MD  Orthopedic Surgery        Please excuse any typos/errors, as this note was created with the assistance of voice recognition software. While intending to generate a document that actually reflects the content of the visit, the document can still have some errors including those of syntax and sound-a-like substitutions which may escape proof reading. In such instances, actual meaning can be extrapolated by context.

## 2022-08-17 ENCOUNTER — HOSPITAL ENCOUNTER (OUTPATIENT)
Dept: PHYSICAL THERAPY | Facility: CLINIC | Age: 52
Setting detail: THERAPIES SERIES
Discharge: HOME | End: 2022-08-17
Payer: COMMERCIAL

## 2022-08-17 PROCEDURE — 97110 THERAPEUTIC EXERCISES: CPT

## 2022-08-17 PROCEDURE — 97161 PT EVAL LOW COMPLEX 20 MIN: CPT

## 2022-08-17 NOTE — CONSULTS
[facility] [address] omicOscar Chaidez  P: [phone]  F: [phone]     Physical Therapy Lower Extremity Evaluation    Date:  2022  Patient: Efrain Burton  : 1970  MRN: [id number]  Physician: Kristen Bill MD    Insurance: [facility]   Medical Diagnosis: H72.878 (ICD-10-CM) - Posterior tibial tendinitis of right lower extremity    Rehab Codes: R26.2 difficulty walking, M62.81 muscle weakness  Onset date: 22   Next Dr's appt. : tbd    Subjective:   CC/HPI: Patient reports R ankle pain began a year ago but has progressively worsened the past 6 mo  VICENTE unknown    Location R Anterior TCJ, around medial malleoli, up medial leg along posterior tib   Pain Rating currently    Pain at worse 8/10   What makes it worse Walking and stairs   Pain at best 2-3/10   What makes it better sitting   Description of pain Continuous aching with intermittent sharp stabbing   Sleep Disturbed yes   Altered Sensation    Symptom progression worsening   Work Activities/duties  homemaker   Recreational Activities                    PMHx: [] Unremarkable [x] Diabetes [x] HTN  [] Pacemaker   [x] MI/Heart Problems: CHF, weak heart muscle, palpitations [] Cancer [] Arthritis   [] Other: partial  R TKR       L Full TKR               [x] Refer to full medical chart  In EPIC     Medications:  [x] Refer to full medical record [] None [] Other:  Allergies:       [x] Refer to full medical record [] None [] Other:    Tests:   1. Remote avulsion fracture fragments along the inferior aspect of the   lateral malleolus. Evidence of remote partial tearing of the ATFL and medial   deltoid ligament complex. 2. Mild diffuse degenerative changes as detailed above. 3. Mild edema in the subcutaneous fat about the ankle and midfoot. 4. Moderate central cord plantar fasciitis. Mild plantar calcaneal spur.    5. Mild peroneal and medial flexor tenosynovitis. Mild insertional   tendinosis of the posterior tibialis tendon.                      -Mild degenerative changes noted at the tibiotalar joint and diffusely throughout the midfoot. No discrete mass identified at the anteromedial ankle. Fluid around the posterior tibial tendon. FINDINGS:  Three views (AP, Mortise, and Lateral) of the right ankle and three views (AP, Oblique, Lateral) of the right foot were obtained in the office today and reviewed, revealing no acute fracture, dislocation, or radiopaque foreign body/tumor. Degenerative changes at the tibiotalar joint with spacing, sclerosis. Meary's angle is apex plantar. IMPRESSION:  No acute fracture/dislocation. Degenerative changes above. Pes planus. OBSERVATION No Deficit Deficit Comments   Posture      Forward Head [] []    Rounded Shoulders [] []    Kyphosis [] []    Lordosis [] []    Genu Valgus [] []    Genu Varus [] [x] R LE only   Genu Recurvatum [] []    Cavo Varus Foot [] []    Neutral Foot [] []    [place] Valgus Foot [] []    Gastroc Equinus [] [x]    Hallux Valgus [] [x]    Pronation [] []    Supination [] []    Leg Length Discrp [] []     [] []                                              Standing positioning: Mid foot pronation, tibial varus, hindfoot inversion  Holds leg in ER    BALANCE/PROPRIOCEPTION              [] Not tested   Single leg stance       R                     L                                PAIN   Eyes open                     10Sec. 3Sec                  . [x]    Eyes closed                          Sec.                   Sec                  .[]      SLS R leg 10 sec  increased stability compared to L  L leg 3 sec    FUNCTIONAL TESTS PAIN NO PAIN COMMENTS   Step Test 4 [] []    Bilateral Heel Raise [x] [] unstable   Single Heel Raise [] []     [] []      Objective:    ROM  ° A/P STRENGTH    Left Right Left Right   Hip Flex       Ext       ER   4+ 3+   IR       ABD   -3 3-   ADD Knee Flex       Ext 0 -2     Ankle PF       DF  (knee straight) -15 -15     DF   (knee flexed) +5 -2     Inv   5 4+   Ever   5 5   1st MTP DF 70 48     1st MTP PF       Card pull   + +                                        Forefoot supination painful    Flexibility Normal Left tight Right tight   Hip flexor [] [] []   quad [] [] []   HS [] [] []   piriformis [] [] []   ITB [] [] []   gastroc [] [x] [x]   Soleus  [] [x] [x]   Posterior Tib [] [] []   Peroneals [] [] []      Sitting holds foot in inversion    TTP   Anterior TCJ, posterior tib   Callus Pattern      Sensation [] []    Edema [] []    Neurological [] []     [] []     [] []    Gait [] [] Analysis:  Antalgic limp on R LE lateral lean       TESTS (+/-) Left Right Not Tested   Ant. Drawer   [x]   Post. Drawer   [x]   Varus stress    [x]   Valgus Stress    [x]   Tinel's Sign   [x]   Talor Tilt   [x]   Syndesmosis Squeeze Test   [x]     Foot/Ankle Mobility  Left  Right    Talocrural Jt dec dec    Subtalar Jt wfl wfl    1st MTP Jt  dec dec    Forefoot      Midfoot         FUNCTION Normal Difficult Unable   Sitting [x] [] []   Standing [] [x] []   Ambulation [] [x] []   Groom/Dress [] [] []   Lift/Carry [] [x] []   Stairs [] [x] []   Bending [] [x] []   Squat [] [x] []   Kneel [] [] [x]     Comments:Patient demonstrates hip abduction and rotation weakness, varus knee position mid foot pronation to maintain standing balance and intrinsic and extrinsic weakness. Limited TCJ DF and hallux limitis. LEFI=10/80  Patient would benefit from skilled physical therapy to improve mobility, improve strength and reduce pain. Assessment:    STG: (to be met in 9 treatments)  ? Pain: Patient to report 4/10 max R foot pain  ? ROM: Patient to demonstrate -5 degrees R TCJ DF  ? Strength: Patient to demonstrate  3+/5 bilateral hip abduction  ?  Function: demonstrate bilateral heel raise with control  Independent with Home Exercise Programs  Demonstrate Knowledge of fall prevention    LTG: (to be met in 18 treatments)  Patient to report 2/10 intermittent R foot pain with WB  Patient to demonstrate the ability to ambulate without limp  Patient to report 60/80 on LEFI                 Patient goals: Decrease pain    Rehab Potential:  [x] Good  [] Fair  [] Poor   Suggested Professional Referral:  [x] No  [] Yes:  Barriers to Goal Achievement[de-identified]  [x] No  [] Yes:  Domestic Concerns:  [x] No  [] Yes:    Pt. Education:  [x] Plans/Goals, Risks/Benefits discussed  [] Home exercise program    Method of Education: [] Verbal  [] Demo  [] Written  Comprehension of Education:  [] Verbalizes understanding. [] Demonstrates understanding. [] Needs Review. [] Demonstrates/verbalizes understanding of HEP/Ed previously given.     Treatment Plan:  [x] Therapeutic Exercise    [] Aquatic Therapy   [x] Manual Therapy     [] Electrical Stimulation  [x] Instruction in HEP      [] Lumbar/Cervical Traction  [x] Neuromuscular Re-education [] Cold/hotpack  [] Iontophoresis: 4 mg/mL  [x] Vasocompression (GameReady)                    Dexamethasone Sodium  [x] Gait Training             Phosphate 40-80 mAmin         Frequency:  1-2 x/week for 10 visits    Todays Treatment:    Exercises:  Exercise     Reps/ Time Weight/ Level Comments   Banded calf stretch      Tibial IR                              Other:    Specific Instructions for next treatment: toe yoga, calf stretch, LAX rolling, LAX rolling w/ big toe stretch, foot clocks, Fig 8 arch lift, clam shell, posterior chain activation    Evaluation Complexity:  History (Personal factors, comorbidities) [x] 0 [] 1-2 [] 3+   Exam (limitations, restrictions) [x] 1-2 [] 3 [] 4+   Clinical presentation (progression) [x] Stable [] Evolving  [] Unstable   Decision Making [x] Low [] Moderate [] High    [x] Low Complexity [] Moderate Complexity [] High Complexity       Treatment Charges: Mins Units   [x] Evaluation       [x]  Low       []  Moderate       []  High 45 1   [] Modalities     [x]  Ther Exercise 10 1   []  Manual Therapy     []  Ther Activities     []  Aquatics     []  Vasocompression     []  Other       TOTAL TREATMENT TIME: 55    Time in:9am   Time Out:10am    Electronically signed by: Silke Glover PT        Physician Signature:________________________________Date:__________________  By signing above or cosigning this note, I have reviewed this plan of care and certify a need for medically necessary rehabilitation services.      *PLEASE SIGN ABOVE AND FAX BACK ALL PAGES*

## 2022-12-10 ENCOUNTER — HOSPITAL ENCOUNTER (OUTPATIENT)
Dept: WOMENS IMAGING | Age: 52
Discharge: HOME | End: 2022-12-10
Payer: COMMERCIAL

## 2022-12-10 DIAGNOSIS — Z12.31 ENCOUNTER FOR SCREENING MAMMOGRAM FOR MALIGNANT NEOPLASM OF BREAST: ICD-10-CM

## 2022-12-10 PROCEDURE — 77067 SCR MAMMO BI INCL CAD: CPT

## 2023-09-10 ENCOUNTER — APPOINTMENT (OUTPATIENT)
Dept: GENERAL RADIOLOGY | Age: 53
End: 2023-09-10
Payer: COMMERCIAL

## 2023-09-10 ENCOUNTER — HOSPITAL ENCOUNTER (EMERGENCY)
Age: 53
Discharge: HOME | End: 2023-09-10
Attending: EMERGENCY MEDICINE
Payer: COMMERCIAL

## 2023-09-10 VITALS
OXYGEN SATURATION: 100 % | DIASTOLIC BLOOD PRESSURE: 71 MMHG | TEMPERATURE: 98.1 F | BODY MASS INDEX: 36.96 KG/M2 | RESPIRATION RATE: 20 BRPM | HEART RATE: 77 BPM | SYSTOLIC BLOOD PRESSURE: 134 MMHG | WEIGHT: 230 LBS | HEIGHT: 66 IN

## 2023-09-10 DIAGNOSIS — S20.212A RIB CONTUSION, LEFT, INITIAL ENCOUNTER: Primary | ICD-10-CM

## 2023-09-10 DIAGNOSIS — R21 RASH AND OTHER NONSPECIFIC SKIN ERUPTION: ICD-10-CM

## 2023-09-10 PROCEDURE — 6370000000 HC RX 637 (ALT 250 FOR IP): Performed by: PHYSICIAN ASSISTANT

## 2023-09-10 PROCEDURE — 96372 THER/PROPH/DIAG INJ SC/IM: CPT | Performed by: EMERGENCY MEDICINE

## 2023-09-10 PROCEDURE — 71100 X-RAY EXAM RIBS UNI 2 VIEWS: CPT

## 2023-09-10 PROCEDURE — 99284 EMERGENCY DEPT VISIT MOD MDM: CPT | Performed by: EMERGENCY MEDICINE

## 2023-09-10 PROCEDURE — 6360000002 HC RX W HCPCS: Performed by: PHYSICIAN ASSISTANT

## 2023-09-10 RX ORDER — DEXAMETHASONE SODIUM PHOSPHATE 10 MG/ML
8 INJECTION, SOLUTION INTRAMUSCULAR; INTRAVENOUS ONCE
Status: COMPLETED | OUTPATIENT
Start: 2023-09-10 | End: 2023-09-10

## 2023-09-10 RX ORDER — ORAL SEMAGLUTIDE 7 MG/1
1 TABLET ORAL DAILY
COMMUNITY

## 2023-09-10 RX ORDER — TRAMADOL HYDROCHLORIDE 50 MG/1
50 TABLET ORAL EVERY 6 HOURS PRN
Qty: 12 TABLET | Refills: 0 | Status: SHIPPED | OUTPATIENT
Start: 2023-09-10 | End: 2023-09-13

## 2023-09-10 RX ORDER — INSULIN GLARGINE 100 [IU]/ML
10 INJECTION, SOLUTION SUBCUTANEOUS NIGHTLY
COMMUNITY

## 2023-09-10 RX ORDER — DIPHENHYDRAMINE HCL 25 MG
25 TABLET ORAL ONCE
Status: COMPLETED | OUTPATIENT
Start: 2023-09-10 | End: 2023-09-10

## 2023-09-10 RX ORDER — TRAMADOL HYDROCHLORIDE 50 MG/1
50 TABLET ORAL ONCE
Status: COMPLETED | OUTPATIENT
Start: 2023-09-10 | End: 2023-09-10

## 2023-09-10 RX ADMIN — DIPHENHYDRAMINE HYDROCHLORIDE 25 MG: 25 TABLET ORAL at 22:58

## 2023-09-10 RX ADMIN — DEXAMETHASONE SODIUM PHOSPHATE 8 MG: 10 INJECTION, SOLUTION INTRAMUSCULAR; INTRAVENOUS at 22:58

## 2023-09-10 RX ADMIN — TRAMADOL HYDROCHLORIDE 50 MG: 50 TABLET, COATED ORAL at 22:58

## 2023-09-10 ASSESSMENT — PAIN - FUNCTIONAL ASSESSMENT: PAIN_FUNCTIONAL_ASSESSMENT: 0-10

## 2023-09-10 ASSESSMENT — PAIN SCALES - GENERAL: PAINLEVEL_OUTOF10: 8

## 2023-12-15 ENCOUNTER — HOSPITAL ENCOUNTER (OUTPATIENT)
Age: 53
Setting detail: OUTPATIENT SURGERY
Discharge: HOME | End: 2023-12-15
Attending: INTERNAL MEDICINE | Admitting: INTERNAL MEDICINE
Payer: COMMERCIAL

## 2023-12-15 VITALS
DIASTOLIC BLOOD PRESSURE: 77 MMHG | SYSTOLIC BLOOD PRESSURE: 109 MMHG | TEMPERATURE: 98.1 F | WEIGHT: 237 LBS | HEIGHT: 66 IN | HEART RATE: 79 BPM | BODY MASS INDEX: 38.09 KG/M2 | RESPIRATION RATE: 16 BRPM | OXYGEN SATURATION: 96 %

## 2023-12-15 DIAGNOSIS — R94.39 ABNORMAL STRESS TEST: ICD-10-CM

## 2023-12-15 LAB
BUN BLD-MCNC: 15 MG/DL (ref 8–26)
CHLORIDE BLD-SCNC: 102 MMOL/L (ref 98–107)
ECHO BSA: 2.24 M2
EGFR, POC: >60 ML/MIN/1.73M2
GLUCOSE BLD-MCNC: 338 MG/DL (ref 74–100)
HCT VFR BLD AUTO: 47 % (ref 36–46)
PLATELET # BLD AUTO: 182 K/UL (ref 138–453)
POC CREATININE: 0.5 MG/DL (ref 0.51–1.19)
POC HEMOGLOBIN (CALC): 16.1 G/DL (ref 12–16)
POTASSIUM BLD-SCNC: 4.5 MMOL/L (ref 3.5–4.5)
SODIUM BLD-SCNC: 140 MMOL/L (ref 138–146)

## 2023-12-15 PROCEDURE — 36415 COLL VENOUS BLD VENIPUNCTURE: CPT

## 2023-12-15 PROCEDURE — 6360000002 HC RX W HCPCS: Performed by: INTERNAL MEDICINE

## 2023-12-15 PROCEDURE — 82947 ASSAY GLUCOSE BLOOD QUANT: CPT

## 2023-12-15 PROCEDURE — 85049 AUTOMATED PLATELET COUNT: CPT

## 2023-12-15 PROCEDURE — 6360000004 HC RX CONTRAST MEDICATION: Performed by: INTERNAL MEDICINE

## 2023-12-15 PROCEDURE — 82435 ASSAY OF BLOOD CHLORIDE: CPT

## 2023-12-15 PROCEDURE — 7100000011 HC PHASE II RECOVERY - ADDTL 15 MIN: Performed by: INTERNAL MEDICINE

## 2023-12-15 PROCEDURE — 84295 ASSAY OF SERUM SODIUM: CPT

## 2023-12-15 PROCEDURE — 2580000003 HC RX 258: Performed by: INTERNAL MEDICINE

## 2023-12-15 PROCEDURE — 85014 HEMATOCRIT: CPT

## 2023-12-15 PROCEDURE — 84132 ASSAY OF SERUM POTASSIUM: CPT

## 2023-12-15 PROCEDURE — 82565 ASSAY OF CREATININE: CPT

## 2023-12-15 PROCEDURE — 2500000003 HC RX 250 WO HCPCS: Performed by: INTERNAL MEDICINE

## 2023-12-15 PROCEDURE — 2709999900 HC NON-CHARGEABLE SUPPLY: Performed by: INTERNAL MEDICINE

## 2023-12-15 PROCEDURE — 99152 MOD SED SAME PHYS/QHP 5/>YRS: CPT | Performed by: INTERNAL MEDICINE

## 2023-12-15 PROCEDURE — C1894 INTRO/SHEATH, NON-LASER: HCPCS | Performed by: INTERNAL MEDICINE

## 2023-12-15 PROCEDURE — 7100000010 HC PHASE II RECOVERY - FIRST 15 MIN: Performed by: INTERNAL MEDICINE

## 2023-12-15 PROCEDURE — 6370000000 HC RX 637 (ALT 250 FOR IP): Performed by: INTERNAL MEDICINE

## 2023-12-15 PROCEDURE — 84520 ASSAY OF UREA NITROGEN: CPT

## 2023-12-15 RX ORDER — SODIUM CHLORIDE 9 MG/ML
INJECTION, SOLUTION INTRAVENOUS CONTINUOUS
Status: DISCONTINUED | OUTPATIENT
Start: 2023-12-15 | End: 2023-12-15 | Stop reason: HOSPADM

## 2023-12-15 RX ORDER — HEPARIN SODIUM 1000 [USP'U]/ML
INJECTION, SOLUTION INTRAVENOUS; SUBCUTANEOUS PRN
Status: DISCONTINUED | OUTPATIENT
Start: 2023-12-15 | End: 2023-12-15 | Stop reason: HOSPADM

## 2023-12-15 RX ORDER — CARVEDILOL 3.12 MG/1
3.12 TABLET ORAL 2 TIMES DAILY WITH MEALS
COMMUNITY

## 2023-12-15 RX ORDER — ISOSORBIDE MONONITRATE 30 MG/1
15 TABLET, EXTENDED RELEASE ORAL DAILY
Qty: 30 TABLET | Refills: 3 | Status: SHIPPED | OUTPATIENT
Start: 2023-12-15

## 2023-12-15 RX ORDER — MIDAZOLAM HYDROCHLORIDE 1 MG/ML
INJECTION INTRAMUSCULAR; INTRAVENOUS PRN
Status: DISCONTINUED | OUTPATIENT
Start: 2023-12-15 | End: 2023-12-15 | Stop reason: HOSPADM

## 2023-12-15 RX ORDER — NITROGLYCERIN 20 MG/100ML
INJECTION INTRAVENOUS PRN
Status: DISCONTINUED | OUTPATIENT
Start: 2023-12-15 | End: 2023-12-15 | Stop reason: HOSPADM

## 2023-12-15 RX ORDER — VERAPAMIL HYDROCHLORIDE 2.5 MG/ML
INJECTION, SOLUTION INTRAVENOUS PRN
Status: DISCONTINUED | OUTPATIENT
Start: 2023-12-15 | End: 2023-12-15 | Stop reason: HOSPADM

## 2023-12-15 RX ORDER — NITROGLYCERIN 0.4 MG/1
0.4 TABLET SUBLINGUAL ONCE
Status: COMPLETED | OUTPATIENT
Start: 2023-12-15 | End: 2023-12-15

## 2023-12-15 RX ORDER — INSULIN LISPRO 100 [IU]/ML
10 INJECTION, SOLUTION INTRAVENOUS; SUBCUTANEOUS ONCE
Status: COMPLETED | OUTPATIENT
Start: 2023-12-15 | End: 2023-12-15

## 2023-12-15 RX ADMIN — NITROGLYCERIN 0.4 MG: 0.4 TABLET SUBLINGUAL at 13:06

## 2023-12-15 RX ADMIN — INSULIN LISPRO 10 UNITS: 100 INJECTION, SOLUTION INTRAVENOUS; SUBCUTANEOUS at 09:18

## 2023-12-15 RX ADMIN — SODIUM CHLORIDE: 9 INJECTION, SOLUTION INTRAVENOUS at 09:19

## 2023-12-15 NOTE — PROGRESS NOTES
Patient admitted, consent signed and questions answered. Patient ready for procedure. Call light to reach with side rails up 2 of 2. B/L Groin clipped with writer and Timothy Velázquez RN present. Family at bedside with patient. History and physical needs completed.

## 2023-12-15 NOTE — PROGRESS NOTES
Dr Lebron Dan in to assess patients chest pain. Verbal order to monitor her for an additional hour then discharge.

## 2023-12-15 NOTE — PROGRESS NOTES
Received post cardiac cath procedure to [facility] room 5. Assessment obtained. Restrictions reviewed with patient. Post procedure pathway initiated. Rt. Radial site soft ,  No hematoma noted. Family at side. Patient without complaints.

## 2023-12-15 NOTE — PROGRESS NOTES
All discharge instructions reviewed, questions answered, paper signed and given copy. Patient discharged per ambulatory with d/c paperwork and belongings.

## 2023-12-15 NOTE — H&P
[facility]    Consult / H&P               Today's Date: 12/15/2023  Patient Name: Jonah Galeana  Date of admission: 12/15/2023  8:40 AM  Patient's age: 52 y. o., 1970  Admission Dx: Abnormal stress test [R94.39]    Requesting Physician: Dennys Siu MD    Cardiac Evaluation Reason:  elective cath     History Obtained From: patient and chart review     History of Present Illness: This patient 52y.o. years old with w/ PMH fibromyalgia, HLD, HTN, orthostatic hypotension, SHERINE on CPAP, hypothyroidism, DMII, NICM EF 45-50%, normal cornaries on cath 7/2021 who was referred for cath by Claudette Coats after her abnormal stress test which is given below     Past Medical History:   has a past medical history of Asthma, Bowel perforation ([address]), Diverticula of intestine, Fatty liver, GERD (gastroesophageal reflux disease), Hepatic steatosis, Hepatomegaly, Hyperlipidemia, Nausea & vomiting, Osteoarthritis, and Vasodepressor syncope. Past Surgical History:   has a past surgical history that includes Endometrial ablation; Hysterectomy; sigmoidectomy; ventral hernia repair (09/25/2014); Cholecystectomy, laparoscopic (09/25/2014); other surgical history (Right, 07/02/2015); Colonoscopy; joint replacement (Right); colostomy; pr office/outpt visit,procedure only (N/A, 06/18/2018); Total knee arthroplasty (Left, 07/01/2019); Upper gastrointestinal endoscopy (N/A, 05/01/2020); Colonoscopy (N/A, 06/29/2020); and Cardiac catheterization (N/A, 12/15/2023). Home Medications:    Prior to Admission medications    Medication Sig Start Date End Date Taking?  Authorizing Provider   carvedilol (COREG) 3.125 MG tablet Take 1 tablet by mouth 2 times daily (with meals)   Yes Lizbet Loredo MD   Semaglutide (RYBELSUS) 7 MG TABS Take 1 tablet by mouth daily    Lizbet Loredo MD   insulin glargine (LANTUS) 100 UNIT/ML injection vial Inject 10 Units into the skin nightly    Lizbet Loredo MD

## 2023-12-15 NOTE — PROGRESS NOTES
Dr. Aleena Cardoso updated that pt. Still having Chest pressure but reports its the same discomfort she exp. At home its not new and she is wanting to be d.c   Verbal order received OK to d/c pt. Pt. Updated to make sure she picks up her imdur and start taking tonight, if symptoms do not subside contact Dr. Aleena Cardoso office. Understanding verb.

## 2023-12-15 NOTE — DISCHARGE INSTRUCTIONS
DISCHARGE INSTRUCTIONS / ARM CARE POST CATHERIZATION        ENCOURAGE FLUIDS    NO STRENUOUS LIFTING WITH AFFECTED ARM FOR 3 DAYS ANYTHING HEAVIER THAN 8 TO 10 POUNDS    REMOVE BAND-AID/PRESSURE DRESSING THE FOLLOWING DAY AND DO NOT APPLY ANY FURTHER BAND-AIDS    KEEP INCISION CLEAN DRY AND OPEN TO THE AIR / NO HAND LOTION NEAR PUNCTURE SITE    WATCH FOR SIGNS OF INFECTION /  REDNESS / SWELLING / DRAINAGE / Aung Lisa / TEMPERATURE GREATER THAN 101    IF BLEEDING OCCURS HOLD MANUAL PRESSURE DIRECTLY OVER SITE  (YOU WILL FEEL PULSATION OF ARTERY) AND IF BLEEDING DOES NOT STOP AFTER 2 MINUTES CALL 911    OK TO SHOWER THE NEXT DAY, NO TUB BATHING OR HOT TUBS/SWIMMING FOR 7 DAYS    IF AREA BECOMES HARD AND SWOLLEN AND IF YOU ARE AT ALL CONCERNED SEEK HELP IMMEDIATELY    SEEK HELP IMMEDIATELY IF AFFECTED ARM BECOMES COLD / [place] / SEVERE PAIN / NAILBEDS TURN BLUE     IF ON METFORMIN / GLUCOPHAGE DO NOT RESTART MEDICATION FOR 48 HOURS    PLEASE PRACTICE GOOD HAND WASHING AND INCLUDE PUNCTURE SITE ESPECIALLY AFTER USING THE RESTROOM    AVOID USING ALCOHOL BASED HAND SANITIZERS FOR ONE WEEK      CALL 911 if you have symptoms including:   Drooping facial muscles   Changes in vision or speech   Difficulty walking or using your limbs   Change in sensation to affected leg, including numbness, feeling cold, or change in color   Extreme sweating, nausea or vomiting   Dizziness or lightheadedness   Chest pain   Rapid, irregular heartbeat   Palpitations   Cough, shortness of breath, or difficulty breathing   Weakness or fainting   If you think you have an emergency, CALL 911 . SEDATION / ANALGESIA INFORMATION / [address] have received the sedation/analgesia medication during your visit    Sedation/analgesia is used during short medical procedures under controlled supervision. The medication will produce a strong relaxation.  You will be able to hear, speak and follow instructions, but your memory and alertness will be

## 2023-12-17 LAB
EKG ATRIAL RATE: 80 BPM
EKG P AXIS: 62 DEGREES
EKG P-R INTERVAL: 172 MS
EKG Q-T INTERVAL: 370 MS
EKG QRS DURATION: 96 MS
EKG QTC CALCULATION (BAZETT): 426 MS
EKG R AXIS: -26 DEGREES
EKG T AXIS: 1 DEGREES
EKG VENTRICULAR RATE: 80 BPM

## 2024-03-11 ENCOUNTER — OFFICE VISIT (OUTPATIENT)
Dept: GASTROENTEROLOGY | Age: 54
End: 2024-03-11
Payer: COMMERCIAL

## 2024-03-11 VITALS
DIASTOLIC BLOOD PRESSURE: 79 MMHG | BODY MASS INDEX: 37.73 KG/M2 | WEIGHT: 234.8 LBS | HEIGHT: 66 IN | SYSTOLIC BLOOD PRESSURE: 123 MMHG | HEART RATE: 88 BPM

## 2024-03-11 DIAGNOSIS — K21.9 GASTROESOPHAGEAL REFLUX DISEASE, UNSPECIFIED WHETHER ESOPHAGITIS PRESENT: ICD-10-CM

## 2024-03-11 DIAGNOSIS — R10.13 ABDOMINAL PAIN, EPIGASTRIC: Primary | ICD-10-CM

## 2024-03-11 PROCEDURE — 99204 OFFICE O/P NEW MOD 45 MIN: CPT | Performed by: INTERNAL MEDICINE

## 2024-03-11 PROCEDURE — G8427 DOCREV CUR MEDS BY ELIG CLIN: HCPCS | Performed by: INTERNAL MEDICINE

## 2024-03-11 PROCEDURE — G8484 FLU IMMUNIZE NO ADMIN: HCPCS | Performed by: INTERNAL MEDICINE

## 2024-03-11 PROCEDURE — 3074F SYST BP LT 130 MM HG: CPT | Performed by: INTERNAL MEDICINE

## 2024-03-11 PROCEDURE — 3078F DIAST BP <80 MM HG: CPT | Performed by: INTERNAL MEDICINE

## 2024-03-11 PROCEDURE — 1036F TOBACCO NON-USER: CPT | Performed by: INTERNAL MEDICINE

## 2024-03-11 PROCEDURE — 3017F COLORECTAL CA SCREEN DOC REV: CPT | Performed by: INTERNAL MEDICINE

## 2024-03-11 PROCEDURE — G8417 CALC BMI ABV UP PARAM F/U: HCPCS | Performed by: INTERNAL MEDICINE

## 2024-03-11 RX ORDER — CARVEDILOL 12.5 MG/1
12.5 TABLET ORAL 2 TIMES DAILY
COMMUNITY
Start: 2023-09-18

## 2024-03-11 RX ORDER — MODAFINIL 100 MG/1
100 TABLET ORAL DAILY
COMMUNITY
Start: 2024-03-06

## 2024-03-11 RX ORDER — CEPHALEXIN 500 MG/1
500 CAPSULE ORAL DAILY
COMMUNITY
Start: 2024-03-02 | End: 2024-03-13

## 2024-03-11 RX ORDER — VALACYCLOVIR HYDROCHLORIDE 500 MG/1
500 TABLET, FILM COATED ORAL DAILY
COMMUNITY
Start: 2023-09-17 | End: 2024-03-13

## 2024-03-11 RX ORDER — DESVENLAFAXINE 100 MG/1
100 TABLET, EXTENDED RELEASE ORAL DAILY
COMMUNITY

## 2024-03-11 RX ORDER — BUMETANIDE 0.5 MG/1
0.5 TABLET ORAL DAILY
COMMUNITY
Start: 2024-01-29

## 2024-03-11 RX ORDER — MONTELUKAST SODIUM 10 MG/1
10 TABLET ORAL DAILY
COMMUNITY
Start: 2024-03-06

## 2024-03-11 RX ORDER — GLIMEPIRIDE 2 MG/1
1 TABLET ORAL
COMMUNITY
Start: 2023-09-18

## 2024-03-11 RX ORDER — PANTOPRAZOLE SODIUM 40 MG/1
40 TABLET, DELAYED RELEASE ORAL 2 TIMES DAILY
COMMUNITY
Start: 2024-03-07

## 2024-03-11 RX ORDER — AZITHROMYCIN 500 MG/1
TABLET, FILM COATED ORAL
COMMUNITY
Start: 2024-02-11 | End: 2024-03-13

## 2024-03-11 RX ORDER — DICYCLOMINE HCL 20 MG
20 TABLET ORAL EVERY 6 HOURS
COMMUNITY
Start: 2024-03-02 | End: 2024-03-13

## 2024-03-11 RX ORDER — OSELTAMIVIR PHOSPHATE 75 MG/1
CAPSULE ORAL
COMMUNITY
Start: 2024-01-04 | End: 2024-03-13

## 2024-03-11 RX ORDER — LIDOCAINE 50 MG/G
1 PATCH TOPICAL EVERY 24 HOURS
COMMUNITY
Start: 2023-09-10 | End: 2024-03-13

## 2024-03-11 RX ORDER — BREXPIPRAZOLE 0.5 MG/1
0.5 TABLET ORAL NIGHTLY
COMMUNITY
Start: 2023-09-25

## 2024-03-11 RX ORDER — ALBUTEROL SULFATE 2.5 MG/3ML
2.5 SOLUTION RESPIRATORY (INHALATION) EVERY 6 HOURS PRN
COMMUNITY
Start: 2023-12-21

## 2024-03-11 RX ORDER — BUSPIRONE HYDROCHLORIDE 10 MG/1
10 TABLET ORAL 3 TIMES DAILY
COMMUNITY
Start: 2023-05-25

## 2024-03-11 RX ORDER — ORAL SEMAGLUTIDE 14 MG/1
TABLET ORAL
COMMUNITY
Start: 2024-01-08 | End: 2024-03-13

## 2024-03-11 RX ORDER — INSULIN GLARGINE 100 [IU]/ML
INJECTION, SOLUTION SUBCUTANEOUS
COMMUNITY
Start: 2023-12-19 | End: 2024-03-13

## 2024-03-11 RX ORDER — ROPINIROLE 4 MG/1
4 TABLET, FILM COATED ORAL NIGHTLY
COMMUNITY
Start: 2024-02-19

## 2024-03-11 RX ORDER — ONDANSETRON 4 MG/1
4 TABLET, ORALLY DISINTEGRATING ORAL EVERY 8 HOURS PRN
COMMUNITY
Start: 2023-08-13

## 2024-03-11 RX ORDER — PREDNISONE 50 MG/1
TABLET ORAL
COMMUNITY
Start: 2024-02-11 | End: 2024-03-13

## 2024-03-11 RX ORDER — CLONAZEPAM 1 MG/1
1 TABLET ORAL NIGHTLY PRN
COMMUNITY
Start: 2024-02-19

## 2024-03-11 ASSESSMENT — ENCOUNTER SYMPTOMS
DIARRHEA: 0
CHOKING: 0
VOMITING: 1
WHEEZING: 0
NAUSEA: 1
ABDOMINAL DISTENTION: 1
TROUBLE SWALLOWING: 1
SORE THROAT: 0
ANAL BLEEDING: 0
ABDOMINAL PAIN: 1
BLOOD IN STOOL: 0
CONSTIPATION: 1
COUGH: 0
RECTAL PAIN: 0

## 2024-03-11 NOTE — PROGRESS NOTES
Reason for Referral:   Georgiana Chen, APRN - CNP  [address] [facility] [address]  [place] [id number]    Chief Complaint   Patient presents with    New Patient     Abd Pain to RUQ, N/V       1. Abdominal pain, epigastric    2. Gastroesophageal reflux disease, unspecified whether esophagitis present            HISTORY OF PRESENT ILLNESS:   Patient seen with a history of abdominal pain.    Patient was doing reasonably well.  She was seen by me last time in 2020.    2 months ago she started to have vague flulike symptoms.  She had repeat attacks of flulike symptoms at least 7 times in the last 2 months.    She gives history of epigastric discomfort and pain.  Has a burning sensation.  Also known to have severe reflux issues.  No dysphagia.  She has intermittent nausea vomiting in the last 2 months.  No hematemesis.  Vomitus contains gastric juice.  She has a markedly decreased appetite.  However no weight loss.  Denies taking NSAIDs.  No prior history of similar symptoms.  Not known to have ulcer disease liver disease, pancreatitis in the past.  She also has a frequent belching and burping's.    Patient has been taking omeprazole 20 mg a day.    Recently patient had ultrasound of the liver done that revealed steatosis.  She had a cholecystectomy in the past.  No biliary ductal dilatation done.  Also patient gives history of having CT scan in [place] when she was waiting, and not able to access reports.    After coming back from [place] she was evaluated by PCP as well.    Previous records reviewed.  She had a colonoscopy done in 2020 no significant abnormalities noted.    Patient does have stressful lifestyle.  Home medications reviewed.  Apparently patient has diabetes and not well-controlled and her blood sugars around 200.  She has been on insulin therapy.      Past Medical,Family, and Social History reviewed and does contribute to the patient presentingcondition.    Patient's PMH/PSH,SH,PSYCH Hx,

## 2024-03-13 ENCOUNTER — HOSPITAL ENCOUNTER (OUTPATIENT)
Dept: PREADMISSION TESTING | Age: 54
Setting detail: OUTPATIENT SURGERY
End: 2024-03-13
Payer: COMMERCIAL

## 2024-03-13 VITALS — HEIGHT: 66 IN | BODY MASS INDEX: 37.77 KG/M2 | WEIGHT: 235 LBS

## 2024-03-13 RX ORDER — FERROUS SULFATE 325(65) MG
325 TABLET ORAL
COMMUNITY

## 2024-03-13 NOTE — PROGRESS NOTES
Pre-op Instructions For Out-Patient Endoscopy Surgery    Medication Instructions:  Please stop herbs and any supplements now (includes vitamins and minerals).    Please contact your surgeon and prescribing physician for pre-op instructions for any blood thinners.    If you have inhalers/aerosol treatments at home, please use them the morning of your surgery and bring the inhalers with you to the hospital.    Please take the following medications the morning of your surgery with a sip of water:    Singulair, Carvedilol, Isosorbide    Surgery Instructions:  After midnight before surgery:  Do not eat or drink anything, including water, mints, gum, and hard candy.  You may brush your teeth without swallowing.  No smoking, chewing tobacco, or street drugs.    Please shower or bathe before surgery.       Please do not wear any cologne, lotion, powder, jewelry, piercings, perfume, makeup, nail polish, hair accessories, or hair spray on the day of surgery.  Wear loose comfortable clothing.    Leave your valuables at home but bring a payment source for any after-surgery prescriptions you plan to fill at [facility].  Bring a storage case for any glasses/contacts.    An adult who is responsible for you MUST drive you home and should be with you for the first 24 hours after surgery.     The Day of Surgery:  Arrive at [facility] Surgery Entrance at the time directed by your surgeon and check in at the desk.     If you have a living will or healthcare power of , please bring a copy.    You will be taken to the pre-op holding area where you will be prepared for surgery.  A physical assessment will be performed by a nurse practitioner or house officer.  Your IV will be started and you will meet your anesthesiologist.    When you go to surgery, your family will be directed to the surgical waiting room, where the doctor should speak with them after your surgery.    After surgery, you will be taken to

## 2024-03-13 NOTE — PRE-PROCEDURE INSTRUCTIONS
Have you received your Prep? N/A Any questions with prep instructions?N/A  Only Clear Liquid Diet day before.N/A  Nothing to eat after midnight day before procedure.INSTRUCTED  Are you taking any blood thinners? ASPIRIN If so, you need to Stop.STOPPED  Remove any jewelry and body piercings.INSTRUCTED  Are you having any Covid symptoms?NO  Do you have any new rashes, infections, etc. that we should be aware of?NO  Do you have a ride home the day of surgery?YES It cannot be a cab or medical transportation.  Verify surgery time/date and what time to arrive at hospital. 0900

## 2024-03-14 ENCOUNTER — ANESTHESIA EVENT (OUTPATIENT)
Dept: ENDOSCOPY | Age: 54
End: 2024-03-14
Payer: COMMERCIAL

## 2024-03-15 ENCOUNTER — HOSPITAL ENCOUNTER (OUTPATIENT)
Age: 54
Setting detail: OUTPATIENT SURGERY
Discharge: HOME | End: 2024-03-15
Attending: INTERNAL MEDICINE | Admitting: INTERNAL MEDICINE
Payer: COMMERCIAL

## 2024-03-15 ENCOUNTER — ANESTHESIA (OUTPATIENT)
Dept: ENDOSCOPY | Age: 54
End: 2024-03-15
Payer: COMMERCIAL

## 2024-03-15 VITALS
SYSTOLIC BLOOD PRESSURE: 114 MMHG | TEMPERATURE: 97.7 F | HEART RATE: 81 BPM | DIASTOLIC BLOOD PRESSURE: 70 MMHG | WEIGHT: 235 LBS | HEIGHT: 66 IN | BODY MASS INDEX: 37.77 KG/M2 | OXYGEN SATURATION: 96 % | RESPIRATION RATE: 18 BRPM

## 2024-03-15 DIAGNOSIS — R10.13 ABDOMINAL PAIN, ACUTE, EPIGASTRIC: ICD-10-CM

## 2024-03-15 LAB
GLUCOSE BLD-MCNC: 155 MG/DL (ref 65–105)
GLUCOSE BLD-MCNC: 214 MG/DL (ref 65–105)

## 2024-03-15 PROCEDURE — 2580000003 HC RX 258: Performed by: ANESTHESIOLOGY

## 2024-03-15 PROCEDURE — 2709999900 HC NON-CHARGEABLE SUPPLY: Performed by: INTERNAL MEDICINE

## 2024-03-15 PROCEDURE — 3700000000 HC ANESTHESIA ATTENDED CARE: Performed by: INTERNAL MEDICINE

## 2024-03-15 PROCEDURE — 7100000010 HC PHASE II RECOVERY - FIRST 15 MIN: Performed by: INTERNAL MEDICINE

## 2024-03-15 PROCEDURE — 82947 ASSAY GLUCOSE BLOOD QUANT: CPT

## 2024-03-15 PROCEDURE — 7100000011 HC PHASE II RECOVERY - ADDTL 15 MIN: Performed by: INTERNAL MEDICINE

## 2024-03-15 PROCEDURE — 2500000003 HC RX 250 WO HCPCS: Performed by: ANESTHESIOLOGY

## 2024-03-15 PROCEDURE — 43239 EGD BIOPSY SINGLE/MULTIPLE: CPT | Performed by: INTERNAL MEDICINE

## 2024-03-15 PROCEDURE — 3700000001 HC ADD 15 MINUTES (ANESTHESIA): Performed by: INTERNAL MEDICINE

## 2024-03-15 PROCEDURE — 2500000003 HC RX 250 WO HCPCS: Performed by: NURSE ANESTHETIST, CERTIFIED REGISTERED

## 2024-03-15 PROCEDURE — 3609012400 HC EGD TRANSORAL BIOPSY SINGLE/MULTIPLE: Performed by: INTERNAL MEDICINE

## 2024-03-15 PROCEDURE — 88305 TISSUE EXAM BY PATHOLOGIST: CPT

## 2024-03-15 PROCEDURE — 6360000002 HC RX W HCPCS: Performed by: NURSE ANESTHETIST, CERTIFIED REGISTERED

## 2024-03-15 RX ORDER — LIDOCAINE HYDROCHLORIDE 10 MG/ML
INJECTION, SOLUTION EPIDURAL; INFILTRATION; INTRACAUDAL; PERINEURAL PRN
Status: DISCONTINUED | OUTPATIENT
Start: 2024-03-15 | End: 2024-03-15 | Stop reason: SDUPTHER

## 2024-03-15 RX ORDER — ONDANSETRON 2 MG/ML
INJECTION INTRAMUSCULAR; INTRAVENOUS PRN
Status: DISCONTINUED | OUTPATIENT
Start: 2024-03-15 | End: 2024-03-15 | Stop reason: SDUPTHER

## 2024-03-15 RX ORDER — SODIUM CHLORIDE, SODIUM LACTATE, POTASSIUM CHLORIDE, CALCIUM CHLORIDE 600; 310; 30; 20 MG/100ML; MG/100ML; MG/100ML; MG/100ML
INJECTION, SOLUTION INTRAVENOUS CONTINUOUS
Status: DISCONTINUED | OUTPATIENT
Start: 2024-03-15 | End: 2024-03-15 | Stop reason: HOSPADM

## 2024-03-15 RX ORDER — PROPOFOL 10 MG/ML
INJECTION, EMULSION INTRAVENOUS PRN
Status: DISCONTINUED | OUTPATIENT
Start: 2024-03-15 | End: 2024-03-15 | Stop reason: SDUPTHER

## 2024-03-15 RX ORDER — DEXAMETHASONE SODIUM PHOSPHATE 4 MG/ML
INJECTION, SOLUTION INTRA-ARTICULAR; INTRALESIONAL; INTRAMUSCULAR; INTRAVENOUS; SOFT TISSUE PRN
Status: DISCONTINUED | OUTPATIENT
Start: 2024-03-15 | End: 2024-03-15 | Stop reason: SDUPTHER

## 2024-03-15 RX ORDER — SODIUM CHLORIDE 0.9 % (FLUSH) 0.9 %
5-40 SYRINGE (ML) INJECTION EVERY 12 HOURS SCHEDULED
Status: DISCONTINUED | OUTPATIENT
Start: 2024-03-15 | End: 2024-03-15 | Stop reason: HOSPADM

## 2024-03-15 RX ORDER — SODIUM CHLORIDE 0.9 % (FLUSH) 0.9 %
5-40 SYRINGE (ML) INJECTION PRN
Status: DISCONTINUED | OUTPATIENT
Start: 2024-03-15 | End: 2024-03-15 | Stop reason: HOSPADM

## 2024-03-15 RX ORDER — LIDOCAINE HYDROCHLORIDE 10 MG/ML
1 INJECTION, SOLUTION EPIDURAL; INFILTRATION; INTRACAUDAL; PERINEURAL
Status: COMPLETED | OUTPATIENT
Start: 2024-03-15 | End: 2024-03-15

## 2024-03-15 RX ORDER — SODIUM CHLORIDE 9 MG/ML
INJECTION, SOLUTION INTRAVENOUS PRN
Status: DISCONTINUED | OUTPATIENT
Start: 2024-03-15 | End: 2024-03-15 | Stop reason: HOSPADM

## 2024-03-15 RX ADMIN — PROPOFOL 50 MG: 10 INJECTION, EMULSION INTRAVENOUS at 11:50

## 2024-03-15 RX ADMIN — PROPOFOL 50 MG: 10 INJECTION, EMULSION INTRAVENOUS at 11:43

## 2024-03-15 RX ADMIN — PROPOFOL 50 MG: 10 INJECTION, EMULSION INTRAVENOUS at 11:46

## 2024-03-15 RX ADMIN — PROPOFOL 50 MG: 10 INJECTION, EMULSION INTRAVENOUS at 11:44

## 2024-03-15 RX ADMIN — PROPOFOL 100 MG: 10 INJECTION, EMULSION INTRAVENOUS at 11:42

## 2024-03-15 RX ADMIN — DEXAMETHASONE SODIUM PHOSPHATE 8 MG: 4 INJECTION INTRA-ARTICULAR; INTRALESIONAL; INTRAMUSCULAR; INTRAVENOUS; SOFT TISSUE at 11:48

## 2024-03-15 RX ADMIN — LIDOCAINE HYDROCHLORIDE 1 ML: 10 INJECTION, SOLUTION EPIDURAL; INFILTRATION; INTRACAUDAL; PERINEURAL at 09:44

## 2024-03-15 RX ADMIN — SODIUM CHLORIDE, POTASSIUM CHLORIDE, SODIUM LACTATE AND CALCIUM CHLORIDE: 600; 310; 30; 20 INJECTION, SOLUTION INTRAVENOUS at 09:44

## 2024-03-15 RX ADMIN — LIDOCAINE HYDROCHLORIDE 50 MG: 10 INJECTION, SOLUTION EPIDURAL; INFILTRATION; INTRACAUDAL; PERINEURAL at 11:42

## 2024-03-15 RX ADMIN — ONDANSETRON 4 MG: 2 INJECTION INTRAMUSCULAR; INTRAVENOUS at 11:48

## 2024-03-15 ASSESSMENT — ENCOUNTER SYMPTOMS
NAUSEA: 0
SHORTNESS OF BREATH: 0
ABDOMINAL PAIN: 0
SINUS PRESSURE: 0

## 2024-03-15 ASSESSMENT — PAIN - FUNCTIONAL ASSESSMENT: PAIN_FUNCTIONAL_ASSESSMENT: 0-10

## 2024-03-15 NOTE — ANESTHESIA POSTPROCEDURE EVALUATION
Department of Anesthesiology  Postprocedure Note    Patient: Lauren Ojeda  MRN: [id number]  YOB: 1970  Date of evaluation: 3/15/2024    Procedure Summary       Date: 03/15/24 Room / Location: [place] [address] / [facility]    Anesthesia Start: 1139 Anesthesia Stop: 1202    Procedure: ESOPHAGOGASTRODUODENOSCOPY BIOPSY (Esophagus) Diagnosis:       Abdominal pain, acute, epigastric      (Abdominal pain, acute, epigastric [R10.13])    Surgeons: Fran Melo MD Responsible Provider: Lluvia Diego MD    Anesthesia Type: general ASA Status: 3            Anesthesia Type: No value filed.    Alcira Phase I:      Alcira Phase II: Alcira Score: 8    Anesthesia Post Evaluation    Comments: POST- ANESTHESIA EVALUATION       Pt Name: Lauren Ojeda  MRN: [id number]  YOB: 1970  Date of evaluation: 3/15/2024  Time:  1:34 PM      /70   Pulse 81   Temp 97.7 °F (36.5 °C)   Resp 18   Ht 1.676 m (5' 6\")   Wt 106.6 kg (235 lb)   SpO2 96%   BMI 37.93 kg/m²      Consciousness Level  Awake  Cardiopulmonary Status  Stable  Pain Adequately Treated YES  Nausea / Vomiting  NO  Adequate Hydration  YES  Anesthesia Related Complications NONE      Electronically signed by Lluvia Diego MD on 3/15/2024 at 1:34 PM           No notable events documented.

## 2024-03-15 NOTE — H&P
HISTORY and PHYSICAL  [facility]       NAME:  Lauren Ojeda  MRN: [id number]   YOB: 1970   Date: 3/15/2024   Age: 53 y.o.  Gender: female       COMPLAINT AND PRESENT HISTORY:               Lauren Ojeda is 53 y.o., female, undergoing for EGD BIOPSY.  ESOPHAGOGASTRODUODENOSCOPY.    Pt is being seen for hx of:  Pre-Op Diagnosis Codes:     * Abdominal pain, acute, epigastric     Patient has had previous endoscopies done last in 2020.       Patient admits to heartburn, indigestion, acid reflux (GERD).  Pt is taking Protonix.     Pt denies any dysphagia . No  regurgitation.     Pt has hx epigastric pain,with sharpness  and emesis. She  rates at 10/10 with episodes that are sporadic,   Admits to belching as well, she states she still has thee sxs, but not as bad.   Pt admits to  changes in appetite, it is decreased. There has been no  Wt loss.   Pt  denies any  changes in bowel habits.  Denies any  blood in stools, or tarry stools.      Medical history reviewed:  SHERINE uses CPAP.   Patient voices feeling well today. Denies any recent fever or chills, chest pain /pressure . Pt reports no SOB..     Hx of smoking :  no    Patient has been NPO since midnight. No blood thinners in the past 5-days (aspirin) . Pt took Singulair, Carvedilol, Isosorbide with sip of water    Pt has prolonged emergence with general anesthesia.  Otherwise denies any issues with Anesthesia.     RECENT LABS, IMAGING AND TESTING     Lab Results   Component Value Date    WBC 14.1 (H) 04/27/2020    RBC 4.96 04/27/2020    HGB 14.1 04/27/2020    HCT 43.0 04/27/2020    MCV 86.8 04/27/2020    MCH 28.4 04/27/2020    MCHC 32.8 04/27/2020    RDW 14.0 04/27/2020     12/15/2023    MPV 7.2 04/27/2020        Lab Results   Component Value Date     06/23/2020    K 3.9 06/23/2020     06/23/2020    CO2 25 06/23/2020    BUN 10 06/23/2020    CREATININE 0.5 (L) 12/15/2023    GLUCOSE 159 (H) 06/23/2020    CALCIUM 9.1  01/12/2015    Status post laparoscopic cholecystectomy 09/26/2014    Status post repair of ventral hernia 09/26/2014    Leukocytosis 09/26/2014    Anemia 09/26/2014           CATALINA ESPINOZA, APRN - CNP on 3/15/2024 at 9:02 AM

## 2024-03-15 NOTE — ANESTHESIA PRE PROCEDURE
history of anesthetic complications (Prolonged emergence from general anesthesia):   Airway: Mallampati: III  TM distance: >3 FB   Neck ROM: full  Mouth opening: > = 3 FB   Dental:          Pulmonary:normal exam    (+)     sleep apnea: on CPAP,       asthma:                            Cardiovascular:    (+) hypertension:, hyperlipidemia      ECG reviewed  Rhythm: regular  Rate: normal           Beta Blocker:  Dose within 24 Hrs      ROS comment: Vasodepressor syncope      Neuro/Psych:   (+) neuromuscular disease:             ROS comment: Fibromyalgia GI/Hepatic/Renal:   (+) GERD:, liver disease:, morbid obesity         ROS comment: Abdominal pain.   Endo/Other:    (+) DiabetesType II DM, : arthritis: OA..                 Abdominal:             Vascular: negative vascular ROS.         Other Findings:         Anesthesia Plan      general     ASA 3     (TIVA)  Induction: intravenous.    MIPS: Prophylactic antiemetics administered.  Anesthetic plan and risks discussed with patient.      Plan discussed with CRNA.                  Lluvia Diego MD   3/15/2024

## 2024-03-15 NOTE — DISCHARGE INSTRUCTIONS
To see in the office next 2 to 3 weeks    EGD DISCHARGE INSTRUCTIONS    Activity:  Rest today. No driving, operating machinery, or making any important decisions today. May resume normal activity tomorrow.    Diet:  Following EGD eat slowly, chew food well, cut food into small pieces-Avoid greasy, spicy, \"crunchy\" foods X 48 hours.     Call your Doctor if you have any of the following:  -Passing blood rectally or vomiting blood (it may be red or black).  -Persistent nausea/vomiting  -Severe abdominal or chest pain not relieved with passing gas  -Fever of 100 degrees or more  -Redness or swelling at the IV site  -SEVERE CHEST PAIN OR SHORTNESS OF BREATH-CALL 911  -Severe sore throat or neck pain    Sedation or General Anesthesia, Adult  Care After  Refer to this sheet in the next 24 hours. These instructions provide you with information on caring for yourself after your procedure. Your caregiver may also give you more specific instructions. Your treatment has been planned according to current medical practices, but problems sometimes occur. Call your caregiver if you have any problems or questions after your procedure.   HOME CARE INSTRUCTIONS   Do not participate in any activities that require you to be alert or coordinated. Do not:  Drive.  Swim.  Ride a bicycle.  Operate heavy machinery.  Cook.  Use power tools.  Climb ladders.  Work at heights.  Take a bath.  Do not drink alcohol.  Do not make any important decisions or sign legal documents.  Stay with an adult.  The first meal following your procedure should be light and small. Avoid solid foods if you feel sick to your stomach (nauseous) or if you throw up (vomit).  Drink enough fluids to keep your urine clear or pale yellow.  Only take your usual medicines or new medicines if your caregiver approves them.  Only take over-the-counter or prescription medicines for pain, discomfort, or fever as directed by your caregiver.  Keep all follow-up appointments as  polyp during the test, they may take it out. It will then be tested to make sure it isn't cancer.  If your doctor finds H. pylori bacteria in your stomach, the doctor will prescribe antibiotics. If you have been taking a PPI, the doctor may prescribe a different medicine instead.  Sometimes the doctor may suggest another endoscopy to see how treatment is working. They may also suggest this to recheck certain kinds of polyps. The doctor may also suggest a colonoscopy to look for polyps in your colon.  Follow-up care is a key part of your treatment and safety. Be sure to make and go to all appointments, and call your doctor if you are having problems. It's also a good idea to know your test results and keep a list of the medicines you take.  Current as of: October 19, 2023               Content Version: 14.0  © 2006-2024 [employer].   Care instructions adapted under license by [employer]. If you have questions about a medical condition or this instruction, always ask your healthcare professional. [employer] disclaims any warranty or liability for your use of this information.

## 2024-03-15 NOTE — OP NOTE
EGD PROCEDURE NOTE    DATE OF PROCEDURE: 3/15/2024     SURGEON: Fran Melo MD  ASSISTANT: None  Anesthesia: MAC  PREOPERATIVE DIAGNOSIS: Patient has epigastric discomfort, dyspepsia like symptoms, GERD.  Procedure performed to evaluate upper GI lesions    POSTOPERATIVE DIAGNOSIS: Multiple benign looking gastric polyps    OPERATION: Upper GI endoscopy with Biopsy    ANESTHESIA: Moderate Sedation     ESTIMATED BLOOD LOSS: Less than 50 ml    COMPLICATIONS: None.     SPECIMENS:  Was Obtained: Excision of gastric polyps for histology    Random biopsies from the body of the stomach and antrum to check for H. pylori    Biopsies from the esophagus to evaluate microscopic esophagitis    HISTORY: The patient is a 53 y.o. year old female with history of above preop diagnosis.  I recommended esophagogastroduodenoscopy with possible biopsy and I explained the risk, benefits, expected outcome, and alternatives to the procedure.  Risks included but are not limited to bleeding, infection, respiratory distress, hypotension, and perforation of the esophagus, stomach, or duodenum.  Patient understands and is in agreement.      The patient was counseled at length about the risks of stuart Covid-19 during their perioperative period and any recovery window from their procedure.  The patient was made aware that stuart Covid-19  may worsen their prognosis for recovering from their procedure  and lend to a higher morbidity and/or mortality risk.  All material risks, benefits, and reasonable alternatives including postponing the procedure were discussed. The patient does wish to proceed with the procedure at this time.         PROCEDURE: The patient was given IV conscious sedation.  The patient's SPO2 remained above 90% throughout the procedure.The gastroscope was inserted orally and advanced under direct vision through the esophagus, through the stomach, through the pylorus, and into the descending duodenum.      Post  sedation note :The patient's SPO2 remained above 90% throughout the procedure.the vital signs remained stable , and no immediate complication form the procedure noted, patient will be ready for d/c when criteria is met .      Findings:    Retropharyngeal area was grossly normal appearing    Esophagus: normal.  No signs of peptic esophagitis Velez's mucosa seen.  Squamocolumnar junction is at about 35 cm.  No hiatal hernia.    Multiple random biopsies taken from the esophagus to check for microscopic esophagitis    Stomach:  Multiple 3 to 4 mm, benign looking gastric polyps spread throughout the body of the stomach, antrum and also fundus.  At least 4 of this polyps biopsied, excised for histology.    Stomach distends well with insufflation of air and appears to have satisfactory peristalsis    No retained food in the stomach.    No ulcer crater erosions and inflammation seen.    Duodenum:     Descending: normal    Bulb: normal    The scope was removed and the patient tolerated the procedure well.     Recommendations/Plan:   F/U Biopsies  F/U In Office in 8-12 weeks   Discussed with the family    Electronically signed by Fran Melo MD  on 3/15/2024 at 11:53 AM

## 2024-03-18 LAB — SURGICAL PATHOLOGY REPORT: NORMAL

## 2024-03-26 DIAGNOSIS — R10.13 ABDOMINAL PAIN, EPIGASTRIC: ICD-10-CM

## 2024-04-08 ENCOUNTER — OFFICE VISIT (OUTPATIENT)
Dept: GASTROENTEROLOGY | Age: 54
End: 2024-04-08
Payer: COMMERCIAL

## 2024-04-08 VITALS
SYSTOLIC BLOOD PRESSURE: 120 MMHG | HEART RATE: 84 BPM | HEIGHT: 66 IN | DIASTOLIC BLOOD PRESSURE: 84 MMHG | BODY MASS INDEX: 37.25 KG/M2 | WEIGHT: 231.8 LBS

## 2024-04-08 DIAGNOSIS — K76.0 FATTY LIVER: ICD-10-CM

## 2024-04-08 DIAGNOSIS — K58.2 IRRITABLE BOWEL SYNDROME WITH BOTH CONSTIPATION AND DIARRHEA: Primary | ICD-10-CM

## 2024-04-08 DIAGNOSIS — F41.9 ANXIETY: ICD-10-CM

## 2024-04-08 PROCEDURE — 1036F TOBACCO NON-USER: CPT | Performed by: INTERNAL MEDICINE

## 2024-04-08 PROCEDURE — G8417 CALC BMI ABV UP PARAM F/U: HCPCS | Performed by: INTERNAL MEDICINE

## 2024-04-08 PROCEDURE — 3074F SYST BP LT 130 MM HG: CPT | Performed by: INTERNAL MEDICINE

## 2024-04-08 PROCEDURE — 3079F DIAST BP 80-89 MM HG: CPT | Performed by: INTERNAL MEDICINE

## 2024-04-08 PROCEDURE — G8427 DOCREV CUR MEDS BY ELIG CLIN: HCPCS | Performed by: INTERNAL MEDICINE

## 2024-04-08 PROCEDURE — 3017F COLORECTAL CA SCREEN DOC REV: CPT | Performed by: INTERNAL MEDICINE

## 2024-04-08 PROCEDURE — 99214 OFFICE O/P EST MOD 30 MIN: CPT | Performed by: INTERNAL MEDICINE

## 2024-04-08 ASSESSMENT — ENCOUNTER SYMPTOMS
DIARRHEA: 1
WHEEZING: 0
VOMITING: 1
BLOOD IN STOOL: 0
NAUSEA: 1
VOICE CHANGE: 0
COUGH: 0
ABDOMINAL DISTENTION: 1
ABDOMINAL PAIN: 1
SORE THROAT: 0
TROUBLE SWALLOWING: 0
CONSTIPATION: 0
CHOKING: 0
BACK PAIN: 0
ANAL BLEEDING: 0
RECTAL PAIN: 0
SINUS PRESSURE: 0

## 2024-04-08 NOTE — PROGRESS NOTES
GI OFFICE FOLLOW UP    INTERVAL HISTORY:   No referring provider defined for this encounter.    Chief Complaint   Patient presents with    Follow Up After Procedure     Patient is here today to be seen for a follow up from her colonoscopy.        No diagnosis found.          HISTORY OF PRESENT ILLNESS:   Patient seen with history of and to follow-up Upper abdominal discomfort nausea, dyspepsia like symptoms.    To further evaluate the symptoms, she had a EGD done and was nonspecific.  Found to have multiple small gastric polyps with benign histology.  Biopsies from the esophagus nonspecific.    She also known to have fatty infiltration of the liver.  At present    Transaminases, alkaline phosphatase, bilirubin within normal limits.  Patient claims that her hemoglobin A1c getting better.  Still available blood sugars are more than 150.  Could not access to hemoglobin A1c levels.    On reviewing her weight chart patient weight appears to be stable.  However feels well overweight.    Patient also states that she has significant stress and anxiety she is under the care of a psychiatrist.  On further discussion it appears that some of her symptoms coinciding with the worsening of her anxiety issues.  For several weeks she appears to be asymptomatic and suddenly she develops nausea, epigastric burning sensation etc.  No nocturnal symptoms.  She used to have loose bowels in the past and at present she has constipation.  Past Medical,Family, and Social History reviewed and does contribute to the patient presenting condition.      Patient's PMH/PSH,SH,PSYCH Hx, MEDs, ALLERGIES, and ROS were all reviewed and updated in the appropriate sections. Yes      PAST MEDICAL HISTORY:  Past Medical History:   Diagnosis Date    Asthma     Bowel perforation (HCC) 2020    Diabetes mellitus (HCC)     Diverticula of intestine

## 2024-07-29 ENCOUNTER — HOSPITAL ENCOUNTER (OUTPATIENT)
Age: 54
Discharge: HOME | End: 2024-07-29
Payer: COMMERCIAL

## 2024-07-29 LAB
ANION GAP SERPL CALCULATED.3IONS-SCNC: 14 MMOL/L (ref 9–17)
BASOPHILS # BLD: 0 K/UL (ref 0–0.2)
BASOPHILS NFR BLD: 1 % (ref 0–2)
BNP SERPL-MCNC: 208 PG/ML
BUN SERPL-MCNC: 11 MG/DL (ref 6–20)
CALCIUM SERPL-MCNC: 8.9 MG/DL (ref 8.6–10.4)
CHLORIDE SERPL-SCNC: 101 MMOL/L (ref 98–107)
CO2 SERPL-SCNC: 24 MMOL/L (ref 20–31)
CREAT SERPL-MCNC: 0.7 MG/DL (ref 0.5–0.9)
EOSINOPHIL # BLD: 0.3 K/UL (ref 0–0.4)
EOSINOPHILS RELATIVE PERCENT: 4 % (ref 0–4)
ERYTHROCYTE [DISTWIDTH] IN BLOOD BY AUTOMATED COUNT: 13.5 % (ref 11.5–14.9)
GFR, ESTIMATED: >90 ML/MIN/1.73M2
GLUCOSE SERPL-MCNC: 198 MG/DL (ref 70–99)
HCT VFR BLD AUTO: 42.7 % (ref 36–46)
HGB BLD-MCNC: 14.1 G/DL (ref 12–16)
LYMPHOCYTES NFR BLD: 2.3 K/UL (ref 1–4.8)
LYMPHOCYTES RELATIVE PERCENT: 28 % (ref 24–44)
MCH RBC QN AUTO: 29.3 PG (ref 26–34)
MCHC RBC AUTO-ENTMCNC: 32.9 G/DL (ref 31–37)
MCV RBC AUTO: 89 FL (ref 80–100)
MONOCYTES NFR BLD: 0.4 K/UL (ref 0.1–1.3)
MONOCYTES NFR BLD: 5 % (ref 1–7)
NEUTROPHILS NFR BLD: 62 % (ref 36–66)
NEUTS SEG NFR BLD: 5.2 K/UL (ref 1.3–9.1)
PLATELET # BLD AUTO: 215 K/UL (ref 150–450)
PMV BLD AUTO: 7.1 FL (ref 6–12)
POTASSIUM SERPL-SCNC: 4.2 MMOL/L (ref 3.7–5.3)
RBC # BLD AUTO: 4.8 M/UL (ref 4–5.2)
SODIUM SERPL-SCNC: 139 MMOL/L (ref 135–144)
TSH SERPL DL<=0.05 MIU/L-ACNC: 2.08 UIU/ML (ref 0.3–5)
WBC OTHER # BLD: 8.3 K/UL (ref 3.5–11)

## 2024-07-29 PROCEDURE — 36415 COLL VENOUS BLD VENIPUNCTURE: CPT

## 2024-07-29 PROCEDURE — 84443 ASSAY THYROID STIM HORMONE: CPT

## 2024-07-29 PROCEDURE — 80048 BASIC METABOLIC PNL TOTAL CA: CPT

## 2024-07-29 PROCEDURE — 85025 COMPLETE CBC W/AUTO DIFF WBC: CPT

## 2024-07-29 PROCEDURE — 83880 ASSAY OF NATRIURETIC PEPTIDE: CPT

## 2025-08-23 ENCOUNTER — OFFICE VISIT (OUTPATIENT)
Age: 55
End: 2025-08-23

## 2025-08-23 ENCOUNTER — TELEPHONE (OUTPATIENT)
Age: 55
End: 2025-08-23

## 2025-08-23 VITALS
HEART RATE: 82 BPM | RESPIRATION RATE: 18 BRPM | TEMPERATURE: 97.6 F | BODY MASS INDEX: 36.96 KG/M2 | WEIGHT: 230 LBS | DIASTOLIC BLOOD PRESSURE: 86 MMHG | HEIGHT: 66 IN | OXYGEN SATURATION: 93 % | SYSTOLIC BLOOD PRESSURE: 129 MMHG

## 2025-08-23 DIAGNOSIS — R26.2 DIFFICULTY WALKING: ICD-10-CM

## 2025-08-23 DIAGNOSIS — S92.352A DISPLACED FRACTURE OF FIFTH METATARSAL BONE, LEFT FOOT, INITIAL ENCOUNTER FOR CLOSED FRACTURE: Primary | ICD-10-CM

## 2025-08-23 RX ORDER — DAPAGLIFLOZIN 5 MG/1
TABLET, FILM COATED ORAL
COMMUNITY

## 2025-08-23 RX ORDER — ORAL SEMAGLUTIDE 7 MG/1
TABLET ORAL
COMMUNITY

## 2025-08-23 RX ORDER — ASPIRIN 81 MG/1
81 TABLET, COATED ORAL DAILY
COMMUNITY
Start: 2025-06-02

## 2025-08-23 RX ORDER — DIPHENHYDRAMINE HYDROCHLORIDE 25 MG/1
CAPSULE ORAL
COMMUNITY

## 2025-08-23 RX ORDER — PREDNISONE 20 MG/1
TABLET ORAL
COMMUNITY
Start: 2025-06-26

## 2025-08-23 RX ORDER — CLOBETASOL PROPIONATE 0.5 MG/G
OINTMENT TOPICAL
COMMUNITY
Start: 2025-06-26

## 2025-08-23 RX ORDER — DULAGLUTIDE 0.75 MG/.5ML
0.75 INJECTION, SOLUTION SUBCUTANEOUS WEEKLY
COMMUNITY
Start: 2025-04-18

## 2025-08-23 RX ORDER — DAPAGLIFLOZIN 5 MG/1
5 TABLET, FILM COATED ORAL DAILY
COMMUNITY
Start: 2025-01-31

## 2025-08-23 RX ORDER — BETAMETHASONE DIPROPIONATE 0.5 MG/G
CREAM TOPICAL
COMMUNITY
Start: 2025-07-09

## 2025-08-23 RX ORDER — INSULIN LISPRO 100 [IU]/ML
2-10 INJECTION, SOLUTION INTRAVENOUS; SUBCUTANEOUS
COMMUNITY
Start: 2025-04-18

## 2025-08-23 RX ORDER — ACYCLOVIR 400 MG/1
TABLET ORAL
COMMUNITY
Start: 2025-05-21

## 2025-08-23 RX ORDER — INSULIN GLARGINE 100 [IU]/ML
INJECTION, SOLUTION SUBCUTANEOUS
COMMUNITY
Start: 2025-07-28

## 2025-08-23 ASSESSMENT — ENCOUNTER SYMPTOMS
DIARRHEA: 0
COUGH: 0
CONSTIPATION: 0
NAUSEA: 0
SHORTNESS OF BREATH: 0
EYE DISCHARGE: 0
VOMITING: 0
ABDOMINAL PAIN: 0
EYE PAIN: 0
BACK PAIN: 0
EYE REDNESS: 0
RHINORRHEA: 0
SORE THROAT: 0

## (undated) DEVICE — ENDO KIT W/SYRINGE: Brand: MEDLINE INDUSTRIES, INC.

## (undated) DEVICE — SET HNDPC W COAX BNE CLN TIP SUCT TB BTTRY PWR DISPOSABLE

## (undated) DEVICE — Z INACTIVE USE 2660664 SOLUTION IRRIG 3000ML 0.9% SOD CHL USP UROMATIC PLAS CONT

## (undated) DEVICE — DEFENDO AIR WATER SUCTION AND BIOPSY VALVE KIT FOR  OLYMPUS: Brand: DEFENDO AIR/WATER/SUCTION AND BIOPSY VALVE

## (undated) DEVICE — CHLORAPREP 26ML ORANGE

## (undated) DEVICE — STRAP,POSITIONING,KNEE/BODY,FOAM,4X60": Brand: MEDLINE

## (undated) DEVICE — NEEDLE SPNL L3.5IN PNK HUB S STL REG WALL FIT STYL W/ QNCKE

## (undated) DEVICE — Z DISCONTINUED PER MEDLINE USE 2741943 DRESSING AQUACEL 10 IN ALG W9XL25CM SIL CVR WTRPRF VIR BACT BARR ANTIMIC

## (undated) DEVICE — SUTURE STRATAFIX SYMMETRIC PDS + SZ 1 L18IN ABSRB VLT L48MM SXPP1A400

## (undated) DEVICE — DUAL CUT SAGITTAL BLADE

## (undated) DEVICE — SKIN AFFIX SURG ADHESIVE 72/CS 0.55ML: Brand: MEDLINE

## (undated) DEVICE — 4-PORT MANIFOLD: Brand: NEPTUNE 2

## (undated) DEVICE — SUTURE VCRL SZ 0 L36IN ABSRB UD L36MM CT-1 1/2 CIR J946H

## (undated) DEVICE — STERILE PATIENT PROTECTIVE PAD FOR IMP® KNEE POSITIONERS & COHESIVE WRAP (10 / CASE): Brand: DE MAYO KNEE POSITIONER®

## (undated) DEVICE — BITEBLOCK 54FR W/ DENT RIM BLOX

## (undated) DEVICE — FORCEPS BX L240CM JAW DIA2.4MM ORNG L CAP W/ NDL DISP RAD

## (undated) DEVICE — GLOVE ORANGE PI 7   MSG9070

## (undated) DEVICE — SUTURE VCRL SZ 0 L36IN ABSRB UD CT-1 L36MM 1/2 CIR TAPR PNT VCP946H

## (undated) DEVICE — ELECTROSURGICAL PENCIL BUTTON SWITCH E-Z CLEAN COATED BLADE ELECTRODE 10 FT (3 M) CORD HOLSTER: Brand: MEGADYNE

## (undated) DEVICE — GLOVE SURG SZ 85 STD WHT LTX SYN POLYMER BEAD REINF ANTI RL

## (undated) DEVICE — GLOVE SURG SZ 8 L12IN FNGR THK13MIL BRN LTX SYN POLYMER W

## (undated) DEVICE — STERILE PVP: Brand: MEDLINE INDUSTRIES, INC.

## (undated) DEVICE — DRAPE,REIN 53X77,STERILE: Brand: MEDLINE

## (undated) DEVICE — COOLER THER 20-31IN L CRYO COMB W/ PD KNEE TB GRAV FLO

## (undated) DEVICE — MHPB TOTAL KNEE PACK: Brand: MEDLINE INDUSTRIES, INC.

## (undated) DEVICE — 3M™ STERI-DRAPE™ U-DRAPE 1015: Brand: STERI-DRAPE™

## (undated) DEVICE — CEMENT MIXING SYSTEM WITH FEMORAL BREAKWAY NOZZLE: Brand: REVOLUTION

## (undated) DEVICE — FORCEPS BX L240CM WRK CHN 2.8MM STD CAP W/ NDL MIC MESH

## (undated) DEVICE — COVER,MAYO STAND,STERILE: Brand: MEDLINE

## (undated) DEVICE — GAUZE,SPONGE,FLUFF,6"X6.75",STRL,5/TRAY: Brand: MEDLINE

## (undated) DEVICE — KIT AUTOTRNS APPL AERO 2 SET SYR 2 TIP FOR PLT SEP SYS GPS

## (undated) DEVICE — COVER,MAYO STAND,XL,STERILE: Brand: MEDLINE

## (undated) DEVICE — GOWN,AURORA,NONREINFORCED,LARGE: Brand: MEDLINE

## (undated) DEVICE — STANDARD HYPODERMIC NEEDLE,POLYPROPYLENE HUB: Brand: MONOJECT

## (undated) DEVICE — SUTURE STRATAFIX SYMMETRIC SZ 1 L18IN ABSRB VLT CT1 L36CM SXPP1A404

## (undated) DEVICE — GOWN,POLY REINFORCED,LG: Brand: MEDLINE

## (undated) DEVICE — Z DISCONTINUED GLOVE SURG SZ 7.5 L12IN FNGR THK13MIL WHT ISOLEX

## (undated) DEVICE — DRAPE THYROID

## (undated) DEVICE — JELLY,LUBE,STERILE,FLIP TOP,TUBE,2-OZ: Brand: MEDLINE

## (undated) DEVICE — SOLUTION SURG PREP ANTIMICROBIAL 4 OZ SKIN WND EXIDINE

## (undated) DEVICE — SOLUTION IV IRRIG WATER 1000ML POUR BRL 2F7114

## (undated) DEVICE — SINGLE PORT MANIFOLD: Brand: NEPTUNE 2

## (undated) DEVICE — COVER,TABLE,HEAVY DUTY,50"X90",STRL: Brand: MEDLINE

## (undated) DEVICE — 2108 SERIES SAGITTAL BLADE FLARED, GROUND  (29.0 X 1.32 X 84.0MM)

## (undated) DEVICE — KIT SEP W/ BLD DRAW TB SYR NDL TRNQT PD

## (undated) DEVICE — SYRINGE MED 50ML LUERLOCK TIP

## (undated) DEVICE — Z DISCONTINUED BY MEDLINE USE 2711682 TRAY SKIN PREP DRY W/ PREM GLV

## (undated) DEVICE — BANDAGE,SELF ADHRNT,COFLEX,4"X5YD,STRL: Brand: COLABEL

## (undated) DEVICE — STOCKINETTE,IMPERVIOUS,12X48,STERILE: Brand: MEDLINE

## (undated) DEVICE — 3M™ WARMING BLANKET, UPPER BODY, 10 PER CASE, 42268: Brand: BAIR HUGGER™

## (undated) DEVICE — SUTURE STRATAFIX SPRL MCRYL + SZ 2 0 L27IN ABSRB UD W NDL SXMP1B419

## (undated) DEVICE — ST CHARLES MINOR ABDOMINAL PK: Brand: MEDLINE INDUSTRIES, INC.